# Patient Record
Sex: FEMALE | Race: WHITE | NOT HISPANIC OR LATINO | Employment: OTHER | URBAN - METROPOLITAN AREA
[De-identification: names, ages, dates, MRNs, and addresses within clinical notes are randomized per-mention and may not be internally consistent; named-entity substitution may affect disease eponyms.]

---

## 2019-07-29 ENCOUNTER — OFFICE VISIT (OUTPATIENT)
Dept: GASTROENTEROLOGY | Facility: CLINIC | Age: 59
End: 2019-07-29

## 2019-07-29 VITALS
DIASTOLIC BLOOD PRESSURE: 81 MMHG | BODY MASS INDEX: 29.92 KG/M2 | WEIGHT: 162.6 LBS | SYSTOLIC BLOOD PRESSURE: 131 MMHG | HEIGHT: 62 IN | HEART RATE: 80 BPM

## 2019-07-29 DIAGNOSIS — K21.9 GASTROESOPHAGEAL REFLUX DISEASE, ESOPHAGITIS PRESENCE NOT SPECIFIED: Primary | ICD-10-CM

## 2019-07-29 DIAGNOSIS — R13.19 ESOPHAGEAL DYSPHAGIA: ICD-10-CM

## 2019-07-29 DIAGNOSIS — D12.6 ADENOMATOUS POLYP OF COLON, UNSPECIFIED PART OF COLON: ICD-10-CM

## 2019-07-29 PROCEDURE — 99203 OFFICE O/P NEW LOW 30 MIN: CPT | Performed by: NURSE PRACTITIONER

## 2019-07-29 RX ORDER — AMITRIPTYLINE HYDROCHLORIDE 50 MG/1
50 TABLET, FILM COATED ORAL
Refills: 3 | COMMUNITY
Start: 2019-06-21

## 2019-07-29 RX ORDER — CELECOXIB 200 MG/1
CAPSULE ORAL EVERY 12 HOURS SCHEDULED
COMMUNITY
End: 2020-08-04

## 2019-07-29 RX ORDER — GABAPENTIN 100 MG/1
CAPSULE ORAL
Refills: 2 | COMMUNITY
Start: 2019-07-08 | End: 2020-08-04

## 2019-07-29 RX ORDER — TIZANIDINE 4 MG/1
TABLET ORAL
Refills: 3 | COMMUNITY
Start: 2019-05-23

## 2019-07-29 RX ORDER — ESTRADIOL 0.05 MG/D
FILM, EXTENDED RELEASE TRANSDERMAL
Refills: 12 | COMMUNITY
Start: 2019-07-03

## 2019-07-29 RX ORDER — PRAVASTATIN SODIUM 40 MG
40 TABLET ORAL
Refills: 11 | COMMUNITY
Start: 2019-07-05

## 2019-07-29 RX ORDER — PROMETHAZINE HYDROCHLORIDE 25 MG/1
TABLET ORAL
Refills: 0 | COMMUNITY
Start: 2019-05-28

## 2019-07-29 RX ORDER — DEXLANSOPRAZOLE 60 MG/1
60 CAPSULE, DELAYED RELEASE ORAL DAILY
Qty: 30 CAPSULE | Refills: 11 | Status: SHIPPED | OUTPATIENT
Start: 2019-07-29 | End: 2019-08-28

## 2019-07-29 RX ORDER — FREMANEZUMAB-VFRM 225 MG/1.5ML
INJECTION SUBCUTANEOUS
Refills: 3 | COMMUNITY
Start: 2019-06-11

## 2019-07-29 RX ORDER — LEVETIRACETAM 500 MG/1
1000 TABLET ORAL EVERY 12 HOURS SCHEDULED
COMMUNITY
End: 2021-03-30 | Stop reason: DRUGHIGH

## 2019-07-29 RX ORDER — LINACLOTIDE 290 UG/1
CAPSULE, GELATIN COATED ORAL
Refills: 1 | COMMUNITY
Start: 2019-05-09 | End: 2020-08-04

## 2019-07-29 NOTE — PROGRESS NOTES
"GASTROENTEROLOGY OFFICE NOTE  Delphine Das  [id number]  1960    CARE TEAM  Patient Care Team:  Provider, No Known as PCP - General  Provider, No Known as PCP - Family Medicine  Timur Virgen MD    Referring Provider: Timur Virgen MD    Chief Complaint   Patient presents with   • Heartburn     had it for awhile/ issues are just getting worse    • Nausea     sometimes when eating         HISTORY OF PRESENT ILLNESS:  Ms. Das is a 58-year-old female who presents today with complaints of acid reflux.  She reports that she feels as if her stomach is \"on fire\" constantly and this is been ongoing for about a year.    For the past 2 weeks she has had associated nausea.  She further complains that when she eats she sometimes feels a heaviness in her chest. She has been coughing a lot lately and has developed voice hoarseness.  She is also having troubles with her allergies currently and therefore cannot determine if her coughing and voice hoarseness are related to acid reflux or allergy type symptoms.  She has a long history of acid reflux and has previously taken Prilosec 40 mg daily and Prevacid 30 mg daily as well as Nexium 40 mg twice daily all of which have controlled her reflux for a short time but ultimately she has refractory reflux.  She has not been taking a daily PPI for over a year due to insurance coverage, about 3 weeks ago she began taking Nexium over-the-counter and has not had relief of her current symptoms.    She has a personal and family history of colon polyps.  Her last colonoscopy was in August 2017, 2 tubular adenomatous polyps were removed at that time with recommendations to repeat colonoscopy again in 3 years.    PAST MEDICAL HISTORY  Past Medical History:   Diagnosis Date   • Colon polyp    • GERD (gastroesophageal reflux disease)         PAST SURGICAL HISTORY  Past Surgical History:   Procedure Laterality Date   • COLONOSCOPY     • UPPER GASTROINTESTINAL ENDOSCOPY      "     MEDICATIONS:    Current Outpatient Medications:   •  progesterone (PROMETRIUM) 200 MG capsule, Take  by mouth., Disp: , Rfl:   •  AJOVY 225 MG/1.5ML solution prefilled syringe, INJECT 1.5 ML AS DIRECTED SUBCUTANEOUSLY ONCE A MONTH, Disp: , Rfl: 3  •  amitriptyline (ELAVIL) 50 MG tablet, Take 50 mg by mouth every night at bedtime., Disp: , Rfl: 3  •  celecoxib (CELEBREX) 200 MG capsule, Every 12 (Twelve) Hours., Disp: , Rfl:   •  dexlansoprazole (DEXILANT) 60 MG capsule, Take 1 capsule by mouth Daily for 30 days., Disp: 30 capsule, Rfl: 11  •  estradiol (MINIVELLE, VIVELLE-DOT) 0.05 MG/24HR patch, APPLY 1 PATCH TWICE WEEKLY TO DRY SKIN (EXAMPLE: MON/THURS), Disp: , Rfl: 12  •  gabapentin (NEURONTIN) 100 MG capsule, TAKE ONE CAPSULE BY MOUTH NIGHTLY AT BEDTIME FOR 1 WEEK, THEN INCREASE TO 2 CAPSULES AT BEDTIME FOR 1 WEEK, THEN INCREASE TO 3 CAPSULES NIGH, Disp: , Rfl: 2  •  levETIRAcetam (KEPPRA) 500 MG tablet, Every 12 (Twelve) Hours., Disp: , Rfl:   •  LINZESS 290 MCG capsule capsule, TAKE ONE CAPSULE BY MOUTH EVERY DAY ON AN EMPTY STOMACH, Disp: , Rfl: 1  •  pravastatin (PRAVACHOL) 40 MG tablet, Take 40 mg by mouth every night at bedtime., Disp: , Rfl: 11  •  promethazine (PHENERGAN) 25 MG tablet, TAKE ONE TABLET BY MOUTH THREE TIMES A DAY AS NEEDED FOR HEADACHE NAUSEA AND VOMITING, Disp: , Rfl: 0  •  tiZANidine (ZANAFLEX) 4 MG tablet, TAKE 1/2 TO 1 TABLET BY MOUTH TWO TIMES A DAY AS NEEDED FOR HEADACHE, Disp: , Rfl: 3    ALLERGIES  No Known Allergies    FAMILY HISTORY:  Family History   Problem Relation Age of Onset   • Colon polyps Mother    • Colon polyps Sister        SOCIAL HISTORY  Social History     Socioeconomic History   • Marital status:      Spouse name: Not on file   • Number of children: Not on file   • Years of education: Not on file   • Highest education level: Not on file   Tobacco Use   • Smoking status: Never Smoker   • Smokeless tobacco: Never Used   Substance and Sexual Activity   •  "Alcohol use: Yes     Comment: occasionally    • Drug use: No   • Sexual activity: Defer       REVIEW OF SYSTEMS  Review of Systems   Constitutional: Positive for fatigue.   HENT: Positive for rhinorrhea, trouble swallowing and voice change. Negative for congestion.    Eyes: Positive for itching.   Respiratory: Positive for cough.    Cardiovascular: Negative.    Gastrointestinal: Positive for abdominal pain, nausea and GERD.   Endocrine: Negative.    Genitourinary: Negative.    Musculoskeletal: Negative.    Skin: Negative.    Allergic/Immunologic: Positive for environmental allergies.   Neurological: Positive for headache.   Psychiatric/Behavioral: Negative.      PHYSICAL EXAM   /81   Pulse 80   Ht 157.5 cm (62.01\")   Wt 73.8 kg (162 lb 9.6 oz)   BMI 29.73 kg/m²   Physical Exam   Constitutional: She is oriented to person, place, and time. She appears well-developed and well-nourished.   HENT:   Head: Normocephalic.   Mouth/Throat: Oropharynx is clear and moist.   Eyes: EOM are normal. Pupils are equal, round, and reactive to light.   Neck: Normal range of motion. Neck supple.   Cardiovascular: Normal rate and regular rhythm.   Pulmonary/Chest: Effort normal and breath sounds normal. She has no wheezes. She has no rales.   Abdominal: Soft. Bowel sounds are normal. She exhibits no mass. There is tenderness in the epigastric area. There is no rebound and no guarding. No hernia.   Musculoskeletal: Normal range of motion.   Neurological: She is alert and oriented to person, place, and time. No cranial nerve deficit.   Skin: Skin is warm and dry.   Psychiatric: She has a normal mood and affect. Her behavior is normal. Judgment normal.   Nursing note and vitals reviewed.    Results Review:  [facility] records reviewed and discussed with patient.     ASSESSMENT / PLAN  1.  GERD  -EGD  - Begin Dexilant 60 mg daily  2.  Personal and family history of colon polyps/health maintenance  - Last colonoscopy August " 2017  -Surveillance colonoscopy due in August 2020    Return for Follow up after procedures.    I discussed the patients findings and my recommendations with patient    Arnie Clark APRN

## 2019-07-30 ENCOUNTER — LAB REQUISITION (OUTPATIENT)
Dept: LAB | Facility: HOSPITAL | Age: 59
End: 2019-07-30

## 2019-07-30 ENCOUNTER — OUTSIDE FACILITY SERVICE (OUTPATIENT)
Dept: GASTROENTEROLOGY | Facility: CLINIC | Age: 59
End: 2019-07-30

## 2019-07-30 DIAGNOSIS — R13.10 DYSPHAGIA: ICD-10-CM

## 2019-07-30 DIAGNOSIS — K21.9 GASTRO-ESOPHAGEAL REFLUX DISEASE WITHOUT ESOPHAGITIS: ICD-10-CM

## 2019-07-30 PROCEDURE — 43239 EGD BIOPSY SINGLE/MULTIPLE: CPT | Performed by: INTERNAL MEDICINE

## 2019-07-30 PROCEDURE — 88305 TISSUE EXAM BY PATHOLOGIST: CPT | Performed by: INTERNAL MEDICINE

## 2019-07-31 LAB
CYTO UR: NORMAL
LAB AP CASE REPORT: NORMAL
LAB AP CLINICAL INFORMATION: NORMAL
PATH REPORT.FINAL DX SPEC: NORMAL
PATH REPORT.GROSS SPEC: NORMAL

## 2020-06-01 ENCOUNTER — TELEMEDICINE (OUTPATIENT)
Dept: GASTROENTEROLOGY | Facility: CLINIC | Age: 60
End: 2020-06-01

## 2020-06-01 DIAGNOSIS — K21.9 GASTROESOPHAGEAL REFLUX DISEASE, ESOPHAGITIS PRESENCE NOT SPECIFIED: Primary | ICD-10-CM

## 2020-06-01 DIAGNOSIS — R49.0 HOARSENESS OF VOICE: ICD-10-CM

## 2020-06-01 DIAGNOSIS — R07.89 ATYPICAL CHEST PAIN: ICD-10-CM

## 2020-06-01 PROCEDURE — 99214 OFFICE O/P EST MOD 30 MIN: CPT | Performed by: INTERNAL MEDICINE

## 2020-06-01 NOTE — PROGRESS NOTES
PCP:  Provider, No Known     No referring provider defined for this encounter.    Chief Complaint   Patient presents with   • Hoarse        HPI   The patient is a 59-year-old female with a history of gastroesophageal reflux disease.  She has been on Dexilant.  She does not have significant troubles with dysphasia.  She checked trouble swallowing and has some known esophageal spasm.  She does have some atypical chest pain at times.  This can be left-sided.  She had an upper endoscopy on 7/30/2019.  At that time she had some apparent esophageal spasm as well as a hiatal hernia.  Biopsies were taken and were negative for celiac disease.  She did have some gastritis.  Her last colonoscopy was in 2017.  She has had increasing troubles with hoarseness for the past 2 months.  She saw an ENT and her vocal cords were evaluated.  There was no structural abnormality but they were inflamed.  She has had about a 60 pound weight gain over the past couple years.    No Known Allergies       Current Outpatient Medications:   •  AJOVY 225 MG/1.5ML solution prefilled syringe, INJECT 1.5 ML AS DIRECTED SUBCUTANEOUSLY ONCE A MONTH, Disp: , Rfl: 3  •  amitriptyline (ELAVIL) 50 MG tablet, Take 50 mg by mouth every night at bedtime., Disp: , Rfl: 3  •  celecoxib (CELEBREX) 200 MG capsule, Every 12 (Twelve) Hours., Disp: , Rfl:   •  estradiol (MINIVELLE, VIVELLE-DOT) 0.05 MG/24HR patch, APPLY 1 PATCH TWICE WEEKLY TO DRY SKIN (EXAMPLE: MON/THURS), Disp: , Rfl: 12  •  gabapentin (NEURONTIN) 100 MG capsule, TAKE ONE CAPSULE BY MOUTH NIGHTLY AT BEDTIME FOR 1 WEEK, THEN INCREASE TO 2 CAPSULES AT BEDTIME FOR 1 WEEK, THEN INCREASE TO 3 CAPSULES NIGH, Disp: , Rfl: 2  •  levETIRAcetam (KEPPRA) 500 MG tablet, Every 12 (Twelve) Hours., Disp: , Rfl:   •  LINZESS 290 MCG capsule capsule, TAKE ONE CAPSULE BY MOUTH EVERY DAY ON AN EMPTY STOMACH, Disp: , Rfl: 1  •  pravastatin (PRAVACHOL) 40 MG tablet, Take 40 mg by mouth every night at bedtime., Disp: ,  Rfl: 11  •  progesterone (PROMETRIUM) 200 MG capsule, Take  by mouth., Disp: , Rfl:   •  promethazine (PHENERGAN) 25 MG tablet, TAKE ONE TABLET BY MOUTH THREE TIMES A DAY AS NEEDED FOR HEADACHE NAUSEA AND VOMITING, Disp: , Rfl: 0  •  tiZANidine (ZANAFLEX) 4 MG tablet, TAKE 1/2 TO 1 TABLET BY MOUTH TWO TIMES A DAY AS NEEDED FOR HEADACHE, Disp: , Rfl: 3     Past Medical History:   Diagnosis Date   • Colon polyp    • GERD (gastroesophageal reflux disease)        Past Surgical History:   Procedure Laterality Date   • COLONOSCOPY     • UPPER GASTROINTESTINAL ENDOSCOPY          Social History     Socioeconomic History   • Marital status:      Spouse name: Not on file   • Number of children: Not on file   • Years of education: Not on file   • Highest education level: Not on file   Tobacco Use   • Smoking status: Never Smoker   • Smokeless tobacco: Never Used   Substance and Sexual Activity   • Alcohol use: Yes     Comment: occasionally    • Drug use: No   • Sexual activity: Defer        Family History   Problem Relation Age of Onset   • Colon polyps Mother    • Colon polyps Sister         Review of Systems   Constitutional: Positive for unexpected weight loss. Negative for chills, diaphoresis, fever and unexpected weight gain.   HENT: Positive for trouble swallowing and voice change.    Eyes: Negative for double vision and pain.   Respiratory: Positive for cough and shortness of breath.    Cardiovascular: Positive for chest pain and palpitations.   Gastrointestinal: Negative for abdominal pain, blood in stool, diarrhea, nausea and vomiting.   Genitourinary: Negative for hematuria and urgency.   Neurological: Negative for seizures, syncope and confusion.   Hematological: Positive for adenopathy.        There were no vitals filed for this visit.     Physical Exam   General Appearance: Alert, in no acute distress   Head: Normocephalic, without obvious abnormality, atraumatic   Eyes: Lids and lashes normal, conjunctivae  and sclerae normal, no icterus   Ears: Ears appear intact with no abnormalities noted   Chest Wall: Symmetrical respiratory expansion   Extremities: Moves all extremities well   Skin: No bleeding, bruising or rash   Neurologic: Cranial nerves 2 - 12 grossly intact, no focal deficits     Review of systems was reviewed and positives are noted. All of the remaining review of systems in that system are negative.    Delphine was seen today for hoarse.    Diagnoses and all orders for this visit:    Gastroesophageal reflux disease, esophagitis presence not specified    Hoarseness of voice    Atypical chest pain    Impressions and plan #1 gastroesophageal reflux disease: I am going to continue the Dexilant.  We will have her double it for the next few weeks to see if it does not make a difference.  If this is related to reflux we should see some improvement in the next few weeks.  We will see her back in follow-up.  She will call us with worsening symptoms.  Gallbladder disease could cause atypical chest pain as well but it certainly would not cause hoarseness.    Clinton Bernard MD

## 2020-06-03 ENCOUNTER — TELEPHONE (OUTPATIENT)
Dept: GASTROENTEROLOGY | Facility: CLINIC | Age: 60
End: 2020-06-03

## 2020-06-04 ENCOUNTER — APPOINTMENT (OUTPATIENT)
Dept: CT IMAGING | Facility: HOSPITAL | Age: 60
End: 2020-06-04

## 2020-06-04 ENCOUNTER — APPOINTMENT (OUTPATIENT)
Dept: GENERAL RADIOLOGY | Facility: HOSPITAL | Age: 60
End: 2020-06-04

## 2020-06-04 ENCOUNTER — HOSPITAL ENCOUNTER (INPATIENT)
Facility: HOSPITAL | Age: 60
LOS: 1 days | Discharge: HOME | End: 2020-06-06
Attending: EMERGENCY MEDICINE | Admitting: FAMILY MEDICINE

## 2020-06-04 DIAGNOSIS — Z87.19 HISTORY OF GASTROESOPHAGEAL REFLUX (GERD): ICD-10-CM

## 2020-06-04 DIAGNOSIS — R09.02 HYPOXIA: ICD-10-CM

## 2020-06-04 DIAGNOSIS — Z77.22 SECOND HAND TOBACCO SMOKE EXPOSURE: ICD-10-CM

## 2020-06-04 DIAGNOSIS — I24.9 ACUTE CORONARY SYNDROME (HCC): Primary | ICD-10-CM

## 2020-06-04 DIAGNOSIS — R94.31 ABNORMAL EKG: ICD-10-CM

## 2020-06-04 DIAGNOSIS — R06.02 SHORTNESS OF BREATH: ICD-10-CM

## 2020-06-04 DIAGNOSIS — Z86.39 HISTORY OF HYPERLIPIDEMIA: ICD-10-CM

## 2020-06-04 LAB
ALBUMIN SERPL-MCNC: 4.6 G/DL (ref 3.5–5.2)
ALBUMIN/GLOB SERPL: 1.4 G/DL
ALP SERPL-CCNC: 97 U/L (ref 39–117)
ALT SERPL W P-5'-P-CCNC: 25 U/L (ref 1–33)
ANION GAP SERPL CALCULATED.3IONS-SCNC: 16 MMOL/L (ref 5–15)
AST SERPL-CCNC: 24 U/L (ref 1–32)
BASOPHILS # BLD AUTO: 0.06 10*3/MM3 (ref 0–0.2)
BASOPHILS NFR BLD AUTO: 0.4 % (ref 0–1.5)
BILIRUB SERPL-MCNC: 0.4 MG/DL (ref 0.2–1.2)
BUN BLD-MCNC: 12 MG/DL (ref 6–20)
BUN/CREAT SERPL: 12 (ref 7–25)
CALCIUM SPEC-SCNC: 9.3 MG/DL (ref 8.6–10.5)
CHLORIDE SERPL-SCNC: 100 MMOL/L (ref 98–107)
CO2 SERPL-SCNC: 23 MMOL/L (ref 22–29)
CREAT BLD-MCNC: 1 MG/DL (ref 0.57–1)
CRP SERPL-MCNC: 0.42 MG/DL (ref 0–0.5)
D DIMER PPP FEU-MCNC: <0.27 MCGFEU/ML (ref 0–0.56)
D-LACTATE SERPL-SCNC: 1.6 MMOL/L (ref 0.5–2)
D-LACTATE SERPL-SCNC: 2.5 MMOL/L (ref 0.5–2)
DEPRECATED RDW RBC AUTO: 42.6 FL (ref 37–54)
EOSINOPHIL # BLD AUTO: 0.04 10*3/MM3 (ref 0–0.4)
EOSINOPHIL NFR BLD AUTO: 0.3 % (ref 0.3–6.2)
ERYTHROCYTE [DISTWIDTH] IN BLOOD BY AUTOMATED COUNT: 12.5 % (ref 12.3–15.4)
GFR SERPL CREATININE-BSD FRML MDRD: 57 ML/MIN/1.73
GLOBULIN UR ELPH-MCNC: 3.2 GM/DL
GLUCOSE BLD-MCNC: 110 MG/DL (ref 65–99)
HCT VFR BLD AUTO: 45.3 % (ref 34–46.6)
HGB BLD-MCNC: 15.3 G/DL (ref 12–15.9)
HOLD SPECIMEN: NORMAL
HOLD SPECIMEN: NORMAL
IMM GRANULOCYTES # BLD AUTO: 0.04 10*3/MM3 (ref 0–0.05)
IMM GRANULOCYTES NFR BLD AUTO: 0.3 % (ref 0–0.5)
LACTATE HOLD SPECIMEN: NORMAL
LDH SERPL-CCNC: 256 U/L (ref 135–214)
LYMPHOCYTES # BLD AUTO: 3.26 10*3/MM3 (ref 0.7–3.1)
LYMPHOCYTES NFR BLD AUTO: 22.6 % (ref 19.6–45.3)
MCH RBC QN AUTO: 31.4 PG (ref 26.6–33)
MCHC RBC AUTO-ENTMCNC: 33.8 G/DL (ref 31.5–35.7)
MCV RBC AUTO: 92.8 FL (ref 79–97)
MONOCYTES # BLD AUTO: 0.78 10*3/MM3 (ref 0.1–0.9)
MONOCYTES NFR BLD AUTO: 5.4 % (ref 5–12)
NEUTROPHILS # BLD AUTO: 10.23 10*3/MM3 (ref 1.7–7)
NEUTROPHILS NFR BLD AUTO: 71 % (ref 42.7–76)
NRBC BLD AUTO-RTO: 0 /100 WBC (ref 0–0.2)
NT-PROBNP SERPL-MCNC: 25 PG/ML (ref 5–900)
PLATELET # BLD AUTO: 352 10*3/MM3 (ref 140–450)
PMV BLD AUTO: 9.4 FL (ref 6–12)
POTASSIUM BLD-SCNC: 4.5 MMOL/L (ref 3.5–5.2)
PROCALCITONIN SERPL-MCNC: 0.05 NG/ML (ref 0.1–0.25)
PROT SERPL-MCNC: 7.8 G/DL (ref 6–8.5)
RBC # BLD AUTO: 4.88 10*6/MM3 (ref 3.77–5.28)
SODIUM BLD-SCNC: 139 MMOL/L (ref 136–145)
TROPONIN T SERPL-MCNC: <0.01 NG/ML (ref 0–0.03)
TROPONIN T SERPL-MCNC: <0.01 NG/ML (ref 0–0.03)
WBC NRBC COR # BLD: 14.41 10*3/MM3 (ref 3.4–10.8)
WHOLE BLOOD HOLD SPECIMEN: NORMAL
WHOLE BLOOD HOLD SPECIMEN: NORMAL

## 2020-06-04 PROCEDURE — 84145 PROCALCITONIN (PCT): CPT | Performed by: PHYSICIAN ASSISTANT

## 2020-06-04 PROCEDURE — 93005 ELECTROCARDIOGRAM TRACING: CPT

## 2020-06-04 PROCEDURE — 93005 ELECTROCARDIOGRAM TRACING: CPT | Performed by: PHYSICIAN ASSISTANT

## 2020-06-04 PROCEDURE — 36415 COLL VENOUS BLD VENIPUNCTURE: CPT

## 2020-06-04 PROCEDURE — 25010000002 METHYLPREDNISOLONE PER 40 MG: Performed by: PHYSICIAN ASSISTANT

## 2020-06-04 PROCEDURE — 84484 ASSAY OF TROPONIN QUANT: CPT | Performed by: PHYSICIAN ASSISTANT

## 2020-06-04 PROCEDURE — 71275 CT ANGIOGRAPHY CHEST: CPT

## 2020-06-04 PROCEDURE — 83605 ASSAY OF LACTIC ACID: CPT

## 2020-06-04 PROCEDURE — 71045 X-RAY EXAM CHEST 1 VIEW: CPT

## 2020-06-04 PROCEDURE — 83605 ASSAY OF LACTIC ACID: CPT | Performed by: EMERGENCY MEDICINE

## 2020-06-04 PROCEDURE — 87040 BLOOD CULTURE FOR BACTERIA: CPT

## 2020-06-04 PROCEDURE — 93005 ELECTROCARDIOGRAM TRACING: CPT | Performed by: EMERGENCY MEDICINE

## 2020-06-04 PROCEDURE — 86140 C-REACTIVE PROTEIN: CPT | Performed by: PHYSICIAN ASSISTANT

## 2020-06-04 PROCEDURE — 0 IOPAMIDOL PER 1 ML: Performed by: EMERGENCY MEDICINE

## 2020-06-04 PROCEDURE — 83880 ASSAY OF NATRIURETIC PEPTIDE: CPT

## 2020-06-04 PROCEDURE — 80053 COMPREHEN METABOLIC PANEL: CPT

## 2020-06-04 PROCEDURE — 85379 FIBRIN DEGRADATION QUANT: CPT | Performed by: PHYSICIAN ASSISTANT

## 2020-06-04 PROCEDURE — 84484 ASSAY OF TROPONIN QUANT: CPT

## 2020-06-04 PROCEDURE — 83615 LACTATE (LD) (LDH) ENZYME: CPT | Performed by: PHYSICIAN ASSISTANT

## 2020-06-04 PROCEDURE — 99285 EMERGENCY DEPT VISIT HI MDM: CPT

## 2020-06-04 PROCEDURE — 85025 COMPLETE CBC W/AUTO DIFF WBC: CPT

## 2020-06-04 RX ORDER — SODIUM CHLORIDE 0.9 % (FLUSH) 0.9 %
10 SYRINGE (ML) INJECTION AS NEEDED
Status: DISCONTINUED | OUTPATIENT
Start: 2020-06-04 | End: 2020-06-06 | Stop reason: HOSPADM

## 2020-06-04 RX ORDER — METHYLPREDNISOLONE SODIUM SUCCINATE 40 MG/ML
80 INJECTION, POWDER, LYOPHILIZED, FOR SOLUTION INTRAMUSCULAR; INTRAVENOUS ONCE
Status: COMPLETED | OUTPATIENT
Start: 2020-06-04 | End: 2020-06-04

## 2020-06-04 RX ADMIN — IOPAMIDOL 100 ML: 755 INJECTION, SOLUTION INTRAVENOUS at 22:42

## 2020-06-04 RX ADMIN — SODIUM CHLORIDE, PRESERVATIVE FREE 10 ML: 5 INJECTION INTRAVENOUS at 20:08

## 2020-06-04 RX ADMIN — METHYLPREDNISOLONE SODIUM SUCCINATE 80 MG: 40 INJECTION, POWDER, FOR SOLUTION INTRAMUSCULAR; INTRAVENOUS at 20:08

## 2020-06-04 RX ADMIN — SODIUM CHLORIDE, PRESERVATIVE FREE 10 ML: 5 INJECTION INTRAVENOUS at 20:09

## 2020-06-04 NOTE — ED PROVIDER NOTES
Subjective   This is a 59-year-old female that presents to the ER with progressive shortness of breath that has been ongoing for the last 2 months.  Patient reports allergy type symptoms, which she utilizes Flonase and intermittent antihistamines.  Patient denies any significant postnasal drainage or sinus pressure/congestion.  She reports pain to both ears, scratchy throat, and hoarseness with speaking.  She denies any fever or chills.  She reports shortness of breath with conversation and any type of exertion.  She denies any pleuritic type chest pain, but reports intermittent left sided sharp chest pains that radiates to her left upper back.  She denies any history of asthma or COPD or pulmonary fibrosis, but she has had secondhand smoke exposure for most of her life.  She has been seen multiple times since April, 2020 for the above symptoms.  She has had 2- COVID-19 tests, the last one being last week.  She has tested negative for strep throat and negative for influenza.  She just recently finished a steroid taper last week and has been taking Stahist antihistamine.  She has history of GERD and was evaluated by Dr. Bernard last week.  He did not feel that her current symptoms were related to reflux.  Her last EGD was in 2019 and patient only reported history of hiatal hernia.  She was also seen by Dr. Montano, ENT on 6/1/2020 and had a scope in the office.  He told her that 1 of her vocal cords was inflamed, but otherwise the scope was negative.  He referred her to pulmonology here at [facility] in the close future.  Patient denies any lower extremity pedal edema.  She is currently on estradiol and progesterone for hormone replacement.  No other concerns at this time.      History provided by:  Patient  Shortness of Breath   Duration:  2 months (since April, 2020)  Timing:  Constant  Progression:  Unchanged  Context comment:  Pt has been feeling poorly since April, 2020 with symptoms of ear pain,  sore throat, hoarseness, chest tightness, and shortness of breath.  Relieved by:  Nothing  Worsened by:  Exertion  Ineffective treatments: Treated with a recent steroid taper and finished last week.  Associated symptoms: chest pain (intermittent sharp left sided chest pain), cough (non-productive), ear pain and sore throat (hoarseness)    Associated symptoms: no abdominal pain, no diaphoresis, no fever, no headaches, no neck pain, no sputum production, no syncope, no vomiting and no wheezing    Associated symptoms comment:  Saw Dr. Montano, ENT, on 6/1/20. Scope showed an inflamed vocal cord, but otherwise negative per patient.  ENT is referring patient to Pulmonology in the near future.  Risk factors comment:  Covid-19 negative on 4/29/20 and another negative test last week.  Patient also had negative RSS and negative flu test. Saw Dr. Bernard last week.  EGD last year showed hiatal hernia, but otherwise negative.      Review of Systems   Constitutional: Positive for fatigue. Negative for chills, diaphoresis and fever.   HENT: Positive for ear pain, sore throat (hoarseness) and voice change (hoarseness). Negative for congestion, sinus pressure and sinus pain.    Respiratory: Positive for cough (non-productive) and shortness of breath. Negative for sputum production and wheezing.    Cardiovascular: Positive for chest pain (intermittent sharp left sided chest pain). Negative for syncope.   Gastrointestinal: Positive for nausea. Negative for abdominal pain, constipation, diarrhea and vomiting.   Genitourinary: Negative.    Musculoskeletal: Negative for back pain, myalgias and neck pain.   Neurological: Negative for dizziness, syncope, weakness and headaches.   All other systems reviewed and are negative.      Past Medical History:   Diagnosis Date   • Colon polyp    • GERD (gastroesophageal reflux disease)        No Known Allergies    Past Surgical History:   Procedure Laterality Date   • COLONOSCOPY     • UPPER  GASTROINTESTINAL ENDOSCOPY         Family History   Problem Relation Age of Onset   • Colon polyps Mother    • Colon polyps Sister        Social History     Socioeconomic History   • Marital status:      Spouse name: Not on file   • Number of children: Not on file   • Years of education: Not on file   • Highest education level: Not on file   Tobacco Use   • Smoking status: Never Smoker   • Smokeless tobacco: Never Used   Substance and Sexual Activity   • Alcohol use: Yes     Comment: occasionally    • Drug use: No   • Sexual activity: Defer           Objective   Physical Exam   Constitutional: She is oriented to person, place, and time. She appears well-developed and well-nourished. No distress.   HENT:   Head: Normocephalic and atraumatic.   Nose: No rhinorrhea. Right sinus exhibits no maxillary sinus tenderness and no frontal sinus tenderness. Left sinus exhibits no maxillary sinus tenderness and no frontal sinus tenderness.   Mouth/Throat: Mucous membranes are normal. No posterior oropharyngeal erythema.   Hoarseness with speaking. Oropharynx non-erythematous.   Eyes: Conjunctivae are normal. No scleral icterus.   Neck: Normal range of motion. Neck supple.   Cardiovascular: Regular rhythm, normal heart sounds, intact distal pulses and normal pulses. Tachycardia present.   No pedal edema to bilateral lower extremities   Pulmonary/Chest: Effort normal and breath sounds normal. No accessory muscle usage. Tachypnea noted. No respiratory distress. She has no decreased breath sounds. She has no wheezes. She has no rhonchi.   Tachypnea with conversation.  No wheezes, rhonchi, or rales.   Abdominal: Soft. She exhibits no distension. There is no tenderness.   Musculoskeletal: Normal range of motion. She exhibits no edema.   Lymphadenopathy:     She has no cervical adenopathy.   No cervical LAD but tenderness to bilateral anterior cervical chains.   Neurological: She is alert and oriented to person, place, and time.    Skin: Skin is warm and dry. She is not diaphoretic.   Psychiatric: She has a normal mood and affect. Her behavior is normal.   Nursing note and vitals reviewed.      Critical Care  Performed by: Mari Watson PA-C  Authorized by: Finesse Moreno DO     Critical care provider statement:     Critical care time (minutes):  35    Critical care was necessary to treat or prevent imminent or life-threatening deterioration of the following conditions:  Cardiac failure (Acute coronary syndrome)    Critical care was time spent personally by me on the following activities:  Blood draw for specimens, development of treatment plan with patient or surrogate, evaluation of patient's response to treatment, examination of patient, obtaining history from patient or surrogate, review of old charts, re-evaluation of patient's condition, pulse oximetry, ordering and review of radiographic studies, ordering and review of laboratory studies and ordering and performing treatments and interventions               ED Course  ED Course as of Jun 05 0232 Fri Jun 05, 2020   0226 Initial EKG showed normal sinus rhythm.  There was no acute ST-T wave changes consistent with ischemia.  Chest x-ray showed no acute cardiopulmonary process.  CT angiogram of the chest with contrast revealed no PE or aortic dissection.  Other than some mild atelectasis in the lower lobes and lingula, no active disease in the chest.  CBC and chemistries were essentially normal.  Lactic acid was 2.5 and repeat lactic acid was 1.6.  LDH was 256.  BNP was 25.  Blood cultures x2 sets are in process.  D-dimer was less than 0.27.  Repeat 2-hour EKG showed significant T wave inversion in the anterior leads.  2 sets of troponins were less than 0.010.  Heart score is 4.  Discussed the case in detail with Dr. Moreno, ER attending physician.  He also saw and evaluated the patient.  Patient denies any chest pain throughout the entire ER evaluation, but she does report chest  tightness.  We will initiate heparin bolus and drip, 1 sublingual nitroglycerin.  Due to patient's increased anxiety, we also will order Ativan 0.5 mg IV.  We updated patient and family on all results and need for admission.  History and work-up are concerning for acute coronary syndrome and patient needs cardiac rule out per MI protocol.    [FC]   0229 Discussed the case with Dr. Marin, hospitalist, and she is agreeable to admission on telemetry.    [FC]      ED Course User Index  [FC] Mari Watson PA-C           Recent Results (from the past 24 hour(s))   Comprehensive Metabolic Panel    Collection Time: 06/04/20  6:12 PM   Result Value Ref Range    Glucose 110 (H) 65 - 99 mg/dL    BUN 12 6 - 20 mg/dL    Creatinine 1.00 0.57 - 1.00 mg/dL    Sodium 139 136 - 145 mmol/L    Potassium 4.5 3.5 - 5.2 mmol/L    Chloride 100 98 - 107 mmol/L    CO2 23.0 22.0 - 29.0 mmol/L    Calcium 9.3 8.6 - 10.5 mg/dL    Total Protein 7.8 6.0 - 8.5 g/dL    Albumin 4.60 3.50 - 5.20 g/dL    ALT (SGPT) 25 1 - 33 U/L    AST (SGOT) 24 1 - 32 U/L    Alkaline Phosphatase 97 39 - 117 U/L    Total Bilirubin 0.4 0.2 - 1.2 mg/dL    eGFR Non African Amer 57 (L) >60 mL/min/1.73    Globulin 3.2 gm/dL    A/G Ratio 1.4 g/dL    BUN/Creatinine Ratio 12.0 7.0 - 25.0    Anion Gap 16.0 (H) 5.0 - 15.0 mmol/L   BNP    Collection Time: 06/04/20  6:12 PM   Result Value Ref Range    proBNP 25.0 5.0 - 900.0 pg/mL   Troponin    Collection Time: 06/04/20  6:12 PM   Result Value Ref Range    Troponin T <0.010 0.000 - 0.030 ng/mL   Light Blue Top    Collection Time: 06/04/20  6:12 PM   Result Value Ref Range    Extra Tube hold for add-on    Green Top (Gel)    Collection Time: 06/04/20  6:12 PM   Result Value Ref Range    Extra Tube Hold for add-ons.    Lavender Top    Collection Time: 06/04/20  6:12 PM   Result Value Ref Range    Extra Tube hold for add-on    Gold Top - SST    Collection Time: 06/04/20  6:12 PM   Result Value Ref Range    Extra Tube Hold for add-ons.     CBC Auto Differential    Collection Time: 06/04/20  6:12 PM   Result Value Ref Range    WBC 14.41 (H) 3.40 - 10.80 10*3/mm3    RBC 4.88 3.77 - 5.28 10*6/mm3    Hemoglobin 15.3 12.0 - 15.9 g/dL    Hematocrit 45.3 34.0 - 46.6 %    MCV 92.8 79.0 - 97.0 fL    MCH 31.4 26.6 - 33.0 pg    MCHC 33.8 31.5 - 35.7 g/dL    RDW 12.5 12.3 - 15.4 %    RDW-SD 42.6 37.0 - 54.0 fl    MPV 9.4 6.0 - 12.0 fL    Platelets 352 140 - 450 10*3/mm3    Neutrophil % 71.0 42.7 - 76.0 %    Lymphocyte % 22.6 19.6 - 45.3 %    Monocyte % 5.4 5.0 - 12.0 %    Eosinophil % 0.3 0.3 - 6.2 %    Basophil % 0.4 0.0 - 1.5 %    Immature Grans % 0.3 0.0 - 0.5 %    Neutrophils, Absolute 10.23 (H) 1.70 - 7.00 10*3/mm3    Lymphocytes, Absolute 3.26 (H) 0.70 - 3.10 10*3/mm3    Monocytes, Absolute 0.78 0.10 - 0.90 10*3/mm3    Eosinophils, Absolute 0.04 0.00 - 0.40 10*3/mm3    Basophils, Absolute 0.06 0.00 - 0.20 10*3/mm3    Immature Grans, Absolute 0.04 0.00 - 0.05 10*3/mm3    nRBC 0.0 0.0 - 0.2 /100 WBC   Lactic Acid, Plasma    Collection Time: 06/04/20  6:12 PM   Result Value Ref Range    Lactate 2.5 (C) 0.5 - 2.0 mmol/L   Lactic Acid, Reflex Timer (This will reflex a repeat order 3-3:15 hours after ordered.)    Collection Time: 06/04/20  6:12 PM   Result Value Ref Range    Hold Tube Hold for add-ons.    Procalcitonin    Collection Time: 06/04/20  6:12 PM   Result Value Ref Range    Procalcitonin 0.05 (L) 0.10 - 0.25 ng/mL   Lactate Dehydrogenase    Collection Time: 06/04/20  6:12 PM   Result Value Ref Range     (H) 135 - 214 U/L   C-reactive Protein    Collection Time: 06/04/20  6:12 PM   Result Value Ref Range    C-Reactive Protein 0.42 0.00 - 0.50 mg/dL   D-dimer, Quantitative    Collection Time: 06/04/20  6:12 PM   Result Value Ref Range    D-Dimer, Quantitative <0.27 0.00 - 0.56 MCGFEU/mL   Troponin    Collection Time: 06/04/20  8:04 PM   Result Value Ref Range    Troponin T <0.010 0.000 - 0.030 ng/mL   Lactic Acid, Reflex    Collection Time:  06/04/20 11:18 PM   Result Value Ref Range    Lactate 1.6 0.5 - 2.0 mmol/L   Protime-INR    Collection Time: 06/05/20 12:54 AM   Result Value Ref Range    Protime 13.0 11.5 - 14.0 Seconds    INR 1.01 0.85 - 1.16   aPTT    Collection Time: 06/05/20 12:54 AM   Result Value Ref Range    PTT 28.9 (L) 50.0 - 95.0 seconds   Heparin Anti-Xa    Collection Time: 06/05/20 12:54 AM   Result Value Ref Range    Heparin Anti-Xa (UFH) 0.10 (L) 0.30 - 0.70 IU/ml   CBC Auto Differential    Collection Time: 06/05/20 12:54 AM   Result Value Ref Range    WBC 13.84 (H) 3.40 - 10.80 10*3/mm3    RBC 5.06 3.77 - 5.28 10*6/mm3    Hemoglobin 15.9 12.0 - 15.9 g/dL    Hematocrit 45.5 34.0 - 46.6 %    MCV 89.9 79.0 - 97.0 fL    MCH 31.4 26.6 - 33.0 pg    MCHC 34.9 31.5 - 35.7 g/dL    RDW 12.5 12.3 - 15.4 %    RDW-SD 41.2 37.0 - 54.0 fl    MPV 9.5 6.0 - 12.0 fL    Platelets 341 140 - 450 10*3/mm3    Neutrophil % 85.1 (H) 42.7 - 76.0 %    Lymphocyte % 13.5 (L) 19.6 - 45.3 %    Monocyte % 0.9 (L) 5.0 - 12.0 %    Eosinophil % 0.1 (L) 0.3 - 6.2 %    Basophil % 0.1 0.0 - 1.5 %    Immature Grans % 0.3 0.0 - 0.5 %    Neutrophils, Absolute 11.77 (H) 1.70 - 7.00 10*3/mm3    Lymphocytes, Absolute 1.87 0.70 - 3.10 10*3/mm3    Monocytes, Absolute 0.13 0.10 - 0.90 10*3/mm3    Eosinophils, Absolute 0.01 0.00 - 0.40 10*3/mm3    Basophils, Absolute 0.02 0.00 - 0.20 10*3/mm3    Immature Grans, Absolute 0.04 0.00 - 0.05 10*3/mm3    nRBC 0.0 0.0 - 0.2 /100 WBC     Note: In addition to lab results from this visit, the labs listed above may include labs taken at another facility or during a different encounter within the last 24 hours. Please correlate lab times with ED admission and discharge times for further clarification of the services performed during this visit.    CT Angiogram Chest   Final Result      1. No PE or aortic dissection.   2. Other than some mild atelectasis in the lower lobes and lingula, no active disease in the chest.   3. Small hiatal hernia.       Signer Name: Saji Guevara MD    Signed: 6/4/2020 10:52 PM    Workstation Name: [facility]     [facility]      XR Chest 1 View   Final Result   No acute cardiopulmonary process.       D:  06/04/2020   E:  06/04/2020       This report was finalized on 6/4/2020 7:10 PM by Dr. Shawn Botello.            Vitals:    06/05/20 0130 06/05/20 0131 06/05/20 0140 06/05/20 0200   BP:   109/79 115/71   Pulse: 110 110 107 103   Resp:    20   Temp:       TempSrc:       SpO2: 93% 92% 95% 93%   Weight:       Height:         Medications   sodium chloride 0.9 % flush 10 mL (10 mL Intravenous Given 6/4/20 2009)   sodium chloride 0.9 % flush 10 mL (10 mL Intravenous Given 6/4/20 2008)   heparin 43303 units/250 mL (100 units/mL) in 0.45 % NaCl infusion (12 Units/kg/hr × 73.5 kg Intravenous Currently Infusing 6/5/20 0131)   Pharmacy to Dose Heparin (has no administration in time range)   methylPREDNISolone sodium succinate (SOLU-Medrol) injection 80 mg (80 mg Intravenous Given 6/4/20 2008)   iopamidol (ISOVUE-370) 76 % injection 100 mL (100 mL Intravenous Given 6/4/20 2242)   albuterol sulfate HFA (PROVENTIL HFA;VENTOLIN HFA;PROAIR HFA) inhaler 2 puff (2 puffs Inhalation Given 6/5/20 0055)   heparin (porcine) injection 4,000 Units (4,000 Units Intravenous Given 6/5/20 0118)   nitroglycerin (NITROSTAT) SL tablet 0.4 mg (0.4 mg Sublingual Given 6/5/20 0119)   LORazepam (ATIVAN) injection 0.5 mg (0.5 mg Intravenous Given 6/5/20 0118)     ECG/EMG Results (last 24 hours)     Procedure Component Value Units Date/Time    ECG 12 Lead [[id number]] Collected:  06/04/20 1740     Updated:  06/04/20 1742        ECG 12 Lead         ECG 12 Lead                                           HEART Score (for prediction of 6-week risk of major adverse cardiac event) reviewed and/or performed as part of the patient evaluation and treatment planning process.  The result associated with this review/performance is: 4       MDM    Final  diagnoses:   Acute coronary syndrome (CMS/HCC)   Abnormal EKG   Shortness of breath   Hypoxia   History of hyperlipidemia   History of gastroesophageal reflux (GERD)   Second hand tobacco smoke exposure            Mari Watson PA-C  06/05/20 3240

## 2020-06-05 ENCOUNTER — APPOINTMENT (OUTPATIENT)
Dept: CARDIOLOGY | Facility: HOSPITAL | Age: 60
End: 2020-06-05

## 2020-06-05 PROBLEM — J04.0 LARYNGITIS: Status: ACTIVE | Noted: 2020-06-05

## 2020-06-05 PROBLEM — G43.909 MIGRAINES: Status: ACTIVE | Noted: 2020-06-05

## 2020-06-05 PROBLEM — R06.00 DYSPNEA: Status: ACTIVE | Noted: 2020-06-05

## 2020-06-05 PROBLEM — E87.20 LACTIC ACIDOSIS: Status: ACTIVE | Noted: 2020-06-05

## 2020-06-05 PROBLEM — J38.3 VOCAL CORD DYSFUNCTION: Status: ACTIVE | Noted: 2020-06-05

## 2020-06-05 PROBLEM — Q21.12 PFO (PATENT FORAMEN OVALE): Status: ACTIVE | Noted: 2020-06-05

## 2020-06-05 PROBLEM — I24.9 ACUTE CORONARY SYNDROME: Status: ACTIVE | Noted: 2020-06-05

## 2020-06-05 PROBLEM — I20.0 UNSTABLE ANGINA (HCC): Status: ACTIVE | Noted: 2020-06-05

## 2020-06-05 PROBLEM — D72.829 LEUKOCYTOSIS: Status: ACTIVE | Noted: 2020-06-05

## 2020-06-05 LAB
ANION GAP SERPL CALCULATED.3IONS-SCNC: 15 MMOL/L (ref 5–15)
APTT PPP: 28.9 SECONDS (ref 50–95)
ARTERIAL PATENCY WRIST A: ABNORMAL
ATMOSPHERIC PRESS: ABNORMAL MM[HG]
BASE EXCESS BLDA CALC-SCNC: -2.4 MMOL/L (ref 0–2)
BASOPHILS # BLD AUTO: 0.02 10*3/MM3 (ref 0–0.2)
BASOPHILS # BLD MANUAL: 0 10*3/MM3 (ref 0–0.2)
BASOPHILS NFR BLD AUTO: 0 % (ref 0–1.5)
BASOPHILS NFR BLD AUTO: 0.1 % (ref 0–1.5)
BDY SITE: ABNORMAL
BH CV ECHO MEAS - AO ROOT AREA (BSA CORRECTED): 1.4
BH CV ECHO MEAS - AO ROOT AREA: 5.2 CM^2
BH CV ECHO MEAS - AO ROOT DIAM: 2.6 CM
BH CV ECHO MEAS - BSA(HAYCOCK): 1.9 M^2
BH CV ECHO MEAS - BSA: 1.8 M^2
BH CV ECHO MEAS - BZI_BMI: 32.2 KILOGRAMS/M^2
BH CV ECHO MEAS - BZI_METRIC_HEIGHT: 157.5 CM
BH CV ECHO MEAS - BZI_METRIC_WEIGHT: 79.8 KG
BH CV ECHO MEAS - EDV(CUBED): 60.6 ML
BH CV ECHO MEAS - EDV(MOD-SP2): 41 ML
BH CV ECHO MEAS - EDV(MOD-SP4): 55 ML
BH CV ECHO MEAS - EDV(TEICH): 67 ML
BH CV ECHO MEAS - EF(CUBED): 71.3 %
BH CV ECHO MEAS - EF(MOD-SP2): 63.4 %
BH CV ECHO MEAS - EF(MOD-SP4): 74.5 %
BH CV ECHO MEAS - EF(TEICH): 63.6 %
BH CV ECHO MEAS - EF{MOD-BP}: 73 %
BH CV ECHO MEAS - ESV(CUBED): 17.4 ML
BH CV ECHO MEAS - ESV(MOD-SP2): 15 ML
BH CV ECHO MEAS - ESV(MOD-SP4): 14 ML
BH CV ECHO MEAS - ESV(TEICH): 24.4 ML
BH CV ECHO MEAS - FS: 34.1 %
BH CV ECHO MEAS - IVS/LVPW: 1.2
BH CV ECHO MEAS - IVSD: 1.1 CM
BH CV ECHO MEAS - LA DIMENSION: 3.3 CM
BH CV ECHO MEAS - LA/AO: 1.3
BH CV ECHO MEAS - LAD MAJOR: 5.7 CM
BH CV ECHO MEAS - LAT PEAK E' VEL: 10.6 CM/SEC
BH CV ECHO MEAS - LATERAL E/E' RATIO: 5.1
BH CV ECHO MEAS - LV DIASTOLIC VOL/BSA (35-75): 30.4 ML/M^2
BH CV ECHO MEAS - LV MASS(C)D: 118.1 GRAMS
BH CV ECHO MEAS - LV MASS(C)DI: 65.2 GRAMS/M^2
BH CV ECHO MEAS - LV SYSTOLIC VOL/BSA (12-30): 7.7 ML/M^2
BH CV ECHO MEAS - LVIDD: 3.9 CM
BH CV ECHO MEAS - LVIDS: 2.6 CM
BH CV ECHO MEAS - LVLD AP2: 6.7 CM
BH CV ECHO MEAS - LVLD AP4: 7.6 CM
BH CV ECHO MEAS - LVLS AP2: 6.4 CM
BH CV ECHO MEAS - LVLS AP4: 6.5 CM
BH CV ECHO MEAS - LVPWD: 0.88 CM
BH CV ECHO MEAS - MED PEAK E' VEL: 7.7 CM/SEC
BH CV ECHO MEAS - MEDIAL E/E' RATIO: 7
BH CV ECHO MEAS - MV A MAX VEL: 71.1 CM/SEC
BH CV ECHO MEAS - MV DEC SLOPE: 346 CM/SEC^2
BH CV ECHO MEAS - MV DEC TIME: 0.19 SEC
BH CV ECHO MEAS - MV E MAX VEL: 55.3 CM/SEC
BH CV ECHO MEAS - MV E/A: 0.78
BH CV ECHO MEAS - MV P1/2T MAX VEL: 97.7 CM/SEC
BH CV ECHO MEAS - MV P1/2T: 82.7 MSEC
BH CV ECHO MEAS - MVA P1/2T LCG: 2.3 CM^2
BH CV ECHO MEAS - MVA(P1/2T): 2.7 CM^2
BH CV ECHO MEAS - PA ACC SLOPE: 471.8 CM/SEC^2
BH CV ECHO MEAS - PA ACC TIME: 0.14 SEC
BH CV ECHO MEAS - PA PR(ACCEL): 14 MMHG
BH CV ECHO MEAS - PULM DIAS VEL: 36 CM/SEC
BH CV ECHO MEAS - PULM S/D: 1.6
BH CV ECHO MEAS - PULM SYS VEL: 59.2 CM/SEC
BH CV ECHO MEAS - RVDD: 2.6 CM
BH CV ECHO MEAS - SI(CUBED): 23.9 ML/M^2
BH CV ECHO MEAS - SI(MOD-SP2): 14.4 ML/M^2
BH CV ECHO MEAS - SI(MOD-SP4): 22.6 ML/M^2
BH CV ECHO MEAS - SI(TEICH): 23.6 ML/M^2
BH CV ECHO MEAS - SV(CUBED): 43.2 ML
BH CV ECHO MEAS - SV(MOD-SP2): 26 ML
BH CV ECHO MEAS - SV(MOD-SP4): 41 ML
BH CV ECHO MEAS - SV(TEICH): 42.7 ML
BH CV ECHO MEAS - TAPSE (>1.6): 1.9 CM2
BH CV ECHO MEAS - TR MAX PG: 16 MMHG
BH CV ECHO MEAS - TR MAX VEL: 200.2 CM/SEC
BH CV ECHO MEASUREMENTS AVERAGE E/E' RATIO: 6.04
BH CV VAS BP RIGHT ARM: NORMAL MMHG
BH CV XLRA - RV BASE: 2.3 CM
BH CV XLRA - RV MID: 2.2 CM
BH CV XLRA - TDI S': 8.3 CM/SEC
BILIRUB UR QL STRIP: NEGATIVE
BODY TEMPERATURE: 37 C
BUN BLD-MCNC: 12 MG/DL (ref 6–20)
BUN/CREAT SERPL: 17.1 (ref 7–25)
CALCIUM SPEC-SCNC: 9.1 MG/DL (ref 8.6–10.5)
CHLORIDE SERPL-SCNC: 103 MMOL/L (ref 98–107)
CHOLEST SERPL-MCNC: 205 MG/DL (ref 0–200)
CLARITY UR: CLEAR
CO2 BLDA-SCNC: 22.9 MMOL/L (ref 22–33)
CO2 SERPL-SCNC: 19 MMOL/L (ref 22–29)
COHGB MFR BLD: 0.9 % (ref 0–2)
COLOR UR: YELLOW
CREAT BLD-MCNC: 0.7 MG/DL (ref 0.57–1)
CRP SERPL-MCNC: 0.9 MG/DL (ref 0–0.5)
DEPRECATED RDW RBC AUTO: 41.2 FL (ref 37–54)
DEPRECATED RDW RBC AUTO: 42.9 FL (ref 37–54)
EOSINOPHIL # BLD AUTO: 0.01 10*3/MM3 (ref 0–0.4)
EOSINOPHIL # BLD MANUAL: 0 10*3/MM3 (ref 0–0.4)
EOSINOPHIL NFR BLD AUTO: 0.1 % (ref 0.3–6.2)
EOSINOPHIL NFR BLD MANUAL: 0 % (ref 0.3–6.2)
EPAP: 0
ERYTHROCYTE [DISTWIDTH] IN BLOOD BY AUTOMATED COUNT: 12.5 % (ref 12.3–15.4)
ERYTHROCYTE [DISTWIDTH] IN BLOOD BY AUTOMATED COUNT: 12.6 % (ref 12.3–15.4)
ERYTHROCYTE [SEDIMENTATION RATE] IN BLOOD: 16 MM/HR (ref 0–30)
GFR SERPL CREATININE-BSD FRML MDRD: 86 ML/MIN/1.73
GLUCOSE BLD-MCNC: 135 MG/DL (ref 65–99)
GLUCOSE UR STRIP-MCNC: NEGATIVE MG/DL
HBA1C MFR BLD: 5.4 % (ref 4.8–5.6)
HCO3 BLDA-SCNC: 21.8 MMOL/L (ref 20–26)
HCT VFR BLD AUTO: 44.3 % (ref 34–46.6)
HCT VFR BLD AUTO: 45.5 % (ref 34–46.6)
HCT VFR BLD CALC: 44.4 %
HDLC SERPL-MCNC: 91 MG/DL (ref 40–60)
HGB BLD-MCNC: 14.8 G/DL (ref 12–15.9)
HGB BLD-MCNC: 15.9 G/DL (ref 12–15.9)
HGB BLDA-MCNC: 14.5 G/DL (ref 14–18)
HGB UR QL STRIP.AUTO: NEGATIVE
HOROWITZ INDEX BLD+IHG-RTO: 21 %
IMM GRANULOCYTES # BLD AUTO: 0.04 10*3/MM3 (ref 0–0.05)
IMM GRANULOCYTES NFR BLD AUTO: 0.3 % (ref 0–0.5)
INR PPP: 1.01 (ref 0.85–1.16)
IPAP: 0
KETONES UR QL STRIP: ABNORMAL
LDLC SERPL CALC-MCNC: 99 MG/DL (ref 0–100)
LDLC/HDLC SERPL: 1.09 {RATIO}
LEFT ATRIUM VOLUME INDEX: 21.5 ML/M^2
LEFT ATRIUM VOLUME: 39 ML
LEUKOCYTE ESTERASE UR QL STRIP.AUTO: NEGATIVE
LYMPHOCYTES # BLD AUTO: 1.87 10*3/MM3 (ref 0.7–3.1)
LYMPHOCYTES # BLD MANUAL: 0.64 10*3/MM3 (ref 0.7–3.1)
LYMPHOCYTES NFR BLD AUTO: 13.5 % (ref 19.6–45.3)
LYMPHOCYTES NFR BLD MANUAL: 3 % (ref 5–12)
LYMPHOCYTES NFR BLD MANUAL: 6 % (ref 19.6–45.3)
MAGNESIUM SERPL-MCNC: 2.6 MG/DL (ref 1.6–2.6)
MAXIMAL PREDICTED HEART RATE: 162 BPM
MCH RBC QN AUTO: 30.8 PG (ref 26.6–33)
MCH RBC QN AUTO: 31.4 PG (ref 26.6–33)
MCHC RBC AUTO-ENTMCNC: 33.4 G/DL (ref 31.5–35.7)
MCHC RBC AUTO-ENTMCNC: 34.9 G/DL (ref 31.5–35.7)
MCV RBC AUTO: 89.9 FL (ref 79–97)
MCV RBC AUTO: 92.3 FL (ref 79–97)
METHGB BLD QL: 1 % (ref 0–1.5)
MODALITY: ABNORMAL
MONOCYTES # BLD AUTO: 0.13 10*3/MM3 (ref 0.1–0.9)
MONOCYTES # BLD AUTO: 0.32 10*3/MM3 (ref 0.1–0.9)
MONOCYTES NFR BLD AUTO: 0.9 % (ref 5–12)
NEUTROPHILS # BLD AUTO: 11.77 10*3/MM3 (ref 1.7–7)
NEUTROPHILS # BLD AUTO: 9.76 10*3/MM3 (ref 1.7–7)
NEUTROPHILS NFR BLD AUTO: 85.1 % (ref 42.7–76)
NEUTROPHILS NFR BLD MANUAL: 91 % (ref 42.7–76)
NITRITE UR QL STRIP: NEGATIVE
NOTE: ABNORMAL
NRBC BLD AUTO-RTO: 0 /100 WBC (ref 0–0.2)
NT-PROBNP SERPL-MCNC: 58.2 PG/ML (ref 5–900)
OXYHGB MFR BLDV: 95.1 % (ref 94–99)
PAW @ PEAK INSP FLOW SETTING VENT: 0 CMH2O
PCO2 BLDA: 35.2 MM HG (ref 35–45)
PCO2 TEMP ADJ BLD: 35.2 MM HG (ref 35–45)
PH BLDA: 7.4 PH UNITS (ref 7.35–7.45)
PH UR STRIP.AUTO: 8 [PH] (ref 5–8)
PH, TEMP CORRECTED: 7.4 PH UNITS
PLAT MORPH BLD: NORMAL
PLATELET # BLD AUTO: 341 10*3/MM3 (ref 140–450)
PLATELET # BLD AUTO: 345 10*3/MM3 (ref 140–450)
PMV BLD AUTO: 9.5 FL (ref 6–12)
PMV BLD AUTO: 9.7 FL (ref 6–12)
PO2 BLDA: 84.4 MM HG (ref 83–108)
PO2 TEMP ADJ BLD: 84.4 MM HG (ref 83–108)
POTASSIUM BLD-SCNC: 4.1 MMOL/L (ref 3.5–5.2)
PROT UR QL STRIP: NEGATIVE
PROTHROMBIN TIME: 13 SECONDS (ref 11.5–14)
RBC # BLD AUTO: 4.8 10*6/MM3 (ref 3.77–5.28)
RBC # BLD AUTO: 5.06 10*6/MM3 (ref 3.77–5.28)
RBC MORPH BLD: NORMAL
SARS-COV-2 RNA PNL SPEC NAA+PROBE: NOT DETECTED
SODIUM BLD-SCNC: 137 MMOL/L (ref 136–145)
SP GR UR STRIP: 1.05 (ref 1–1.03)
STRESS TARGET HR: 138 BPM
TOTAL RATE: 0 BREATHS/MINUTE
TRIGL SERPL-MCNC: 74 MG/DL (ref 0–150)
TROPONIN T SERPL-MCNC: <0.01 NG/ML (ref 0–0.03)
TROPONIN T SERPL-MCNC: <0.01 NG/ML (ref 0–0.03)
TSH SERPL DL<=0.05 MIU/L-ACNC: 0.65 UIU/ML (ref 0.27–4.2)
UFH PPP CHRO-ACNC: 0.1 IU/ML (ref 0.3–0.7)
UFH PPP CHRO-ACNC: 0.48 IU/ML (ref 0.3–0.7)
UROBILINOGEN UR QL STRIP: ABNORMAL
VLDLC SERPL-MCNC: 14.8 MG/DL
WBC MORPH BLD: NORMAL
WBC NRBC COR # BLD: 10.72 10*3/MM3 (ref 3.4–10.8)
WBC NRBC COR # BLD: 13.84 10*3/MM3 (ref 3.4–10.8)

## 2020-06-05 PROCEDURE — 83036 HEMOGLOBIN GLYCOSYLATED A1C: CPT | Performed by: INTERNAL MEDICINE

## 2020-06-05 PROCEDURE — 80061 LIPID PANEL: CPT | Performed by: INTERNAL MEDICINE

## 2020-06-05 PROCEDURE — 99222 1ST HOSP IP/OBS MODERATE 55: CPT | Performed by: INTERNAL MEDICINE

## 2020-06-05 PROCEDURE — 84484 ASSAY OF TROPONIN QUANT: CPT | Performed by: INTERNAL MEDICINE

## 2020-06-05 PROCEDURE — 93005 ELECTROCARDIOGRAM TRACING: CPT | Performed by: INTERNAL MEDICINE

## 2020-06-05 PROCEDURE — 99223 1ST HOSP IP/OBS HIGH 75: CPT | Performed by: INTERNAL MEDICINE

## 2020-06-05 PROCEDURE — 36600 WITHDRAWAL OF ARTERIAL BLOOD: CPT

## 2020-06-05 PROCEDURE — 25010000002 HEPARIN (PORCINE) 25000-0.45 UT/250ML-% SOLUTION: Performed by: EMERGENCY MEDICINE

## 2020-06-05 PROCEDURE — 86140 C-REACTIVE PROTEIN: CPT | Performed by: INTERNAL MEDICINE

## 2020-06-05 PROCEDURE — 85025 COMPLETE CBC W/AUTO DIFF WBC: CPT | Performed by: EMERGENCY MEDICINE

## 2020-06-05 PROCEDURE — 82805 BLOOD GASES W/O2 SATURATION: CPT

## 2020-06-05 PROCEDURE — 25010000002 LORAZEPAM PER 2 MG: Performed by: EMERGENCY MEDICINE

## 2020-06-05 PROCEDURE — 80048 BASIC METABOLIC PNL TOTAL CA: CPT | Performed by: INTERNAL MEDICINE

## 2020-06-05 PROCEDURE — 85652 RBC SED RATE AUTOMATED: CPT | Performed by: INTERNAL MEDICINE

## 2020-06-05 PROCEDURE — 85027 COMPLETE CBC AUTOMATED: CPT | Performed by: INTERNAL MEDICINE

## 2020-06-05 PROCEDURE — 83735 ASSAY OF MAGNESIUM: CPT | Performed by: INTERNAL MEDICINE

## 2020-06-05 PROCEDURE — 93306 TTE W/DOPPLER COMPLETE: CPT

## 2020-06-05 PROCEDURE — 94799 UNLISTED PULMONARY SVC/PX: CPT

## 2020-06-05 PROCEDURE — 84443 ASSAY THYROID STIM HORMONE: CPT | Performed by: INTERNAL MEDICINE

## 2020-06-05 PROCEDURE — 85520 HEPARIN ASSAY: CPT | Performed by: EMERGENCY MEDICINE

## 2020-06-05 PROCEDURE — 25010000002 HEPARIN (PORCINE) PER 1000 UNITS: Performed by: EMERGENCY MEDICINE

## 2020-06-05 PROCEDURE — 25010000002 ENOXAPARIN PER 10 MG: Performed by: INTERNAL MEDICINE

## 2020-06-05 PROCEDURE — 85520 HEPARIN ASSAY: CPT

## 2020-06-05 PROCEDURE — 85730 THROMBOPLASTIN TIME PARTIAL: CPT | Performed by: EMERGENCY MEDICINE

## 2020-06-05 PROCEDURE — 87635 SARS-COV-2 COVID-19 AMP PRB: CPT | Performed by: INTERNAL MEDICINE

## 2020-06-05 PROCEDURE — 93306 TTE W/DOPPLER COMPLETE: CPT | Performed by: INTERNAL MEDICINE

## 2020-06-05 PROCEDURE — 85007 BL SMEAR W/DIFF WBC COUNT: CPT | Performed by: INTERNAL MEDICINE

## 2020-06-05 PROCEDURE — 85610 PROTHROMBIN TIME: CPT | Performed by: EMERGENCY MEDICINE

## 2020-06-05 PROCEDURE — 94640 AIRWAY INHALATION TREATMENT: CPT

## 2020-06-05 PROCEDURE — 81003 URINALYSIS AUTO W/O SCOPE: CPT | Performed by: INTERNAL MEDICINE

## 2020-06-05 PROCEDURE — 83880 ASSAY OF NATRIURETIC PEPTIDE: CPT | Performed by: FAMILY MEDICINE

## 2020-06-05 RX ORDER — MORPHINE SULFATE 2 MG/ML
1 INJECTION, SOLUTION INTRAMUSCULAR; INTRAVENOUS EVERY 4 HOURS PRN
Status: DISCONTINUED | OUTPATIENT
Start: 2020-06-05 | End: 2020-06-06 | Stop reason: HOSPADM

## 2020-06-05 RX ORDER — AMITRIPTYLINE HYDROCHLORIDE 50 MG/1
50 TABLET, FILM COATED ORAL NIGHTLY PRN
Status: DISCONTINUED | OUTPATIENT
Start: 2020-06-05 | End: 2020-06-06 | Stop reason: HOSPADM

## 2020-06-05 RX ORDER — ASPIRIN 81 MG/1
324 TABLET, CHEWABLE ORAL ONCE
Status: COMPLETED | OUTPATIENT
Start: 2020-06-05 | End: 2020-06-05

## 2020-06-05 RX ORDER — SODIUM CHLORIDE 9 MG/ML
75 INJECTION, SOLUTION INTRAVENOUS CONTINUOUS
Status: ACTIVE | OUTPATIENT
Start: 2020-06-05 | End: 2020-06-05

## 2020-06-05 RX ORDER — ESTRADIOL 0.05 MG/D
1 FILM, EXTENDED RELEASE TRANSDERMAL 2 TIMES WEEKLY
Status: DISCONTINUED | OUTPATIENT
Start: 2020-06-08 | End: 2020-06-06 | Stop reason: HOSPADM

## 2020-06-05 RX ORDER — NITROGLYCERIN 0.4 MG/1
0.4 TABLET SUBLINGUAL ONCE
Status: COMPLETED | OUTPATIENT
Start: 2020-06-05 | End: 2020-06-05

## 2020-06-05 RX ORDER — HEPARIN SODIUM 1000 [USP'U]/ML
30 INJECTION, SOLUTION INTRAVENOUS; SUBCUTANEOUS AS NEEDED
Status: DISCONTINUED | OUTPATIENT
Start: 2020-06-05 | End: 2020-06-05 | Stop reason: SDUPTHER

## 2020-06-05 RX ORDER — CLOPIDOGREL BISULFATE 75 MG/1
600 TABLET ORAL ONCE
Status: COMPLETED | OUTPATIENT
Start: 2020-06-05 | End: 2020-06-05

## 2020-06-05 RX ORDER — SODIUM CHLORIDE 0.9 % (FLUSH) 0.9 %
10 SYRINGE (ML) INJECTION AS NEEDED
Status: DISCONTINUED | OUTPATIENT
Start: 2020-06-05 | End: 2020-06-06 | Stop reason: HOSPADM

## 2020-06-05 RX ORDER — NALOXONE HCL 0.4 MG/ML
0.4 VIAL (ML) INJECTION
Status: DISCONTINUED | OUTPATIENT
Start: 2020-06-05 | End: 2020-06-06 | Stop reason: HOSPADM

## 2020-06-05 RX ORDER — NITROGLYCERIN 20 MG/100ML
10-50 INJECTION INTRAVENOUS
Status: DISCONTINUED | OUTPATIENT
Start: 2020-06-05 | End: 2020-06-05

## 2020-06-05 RX ORDER — HEPARIN SODIUM 10000 [USP'U]/100ML
12 INJECTION, SOLUTION INTRAVENOUS
Status: DISCONTINUED | OUTPATIENT
Start: 2020-06-05 | End: 2020-06-05

## 2020-06-05 RX ORDER — CLOPIDOGREL BISULFATE 75 MG/1
75 TABLET ORAL DAILY
Status: DISCONTINUED | OUTPATIENT
Start: 2020-06-06 | End: 2020-06-06 | Stop reason: HOSPADM

## 2020-06-05 RX ORDER — HEPARIN SODIUM 1000 [USP'U]/ML
4000 INJECTION, SOLUTION INTRAVENOUS; SUBCUTANEOUS ONCE
Status: COMPLETED | OUTPATIENT
Start: 2020-06-05 | End: 2020-06-05

## 2020-06-05 RX ORDER — ASPIRIN 81 MG/1
81 TABLET ORAL DAILY
Status: DISCONTINUED | OUTPATIENT
Start: 2020-06-06 | End: 2020-06-06 | Stop reason: HOSPADM

## 2020-06-05 RX ORDER — SODIUM CHLORIDE 0.9 % (FLUSH) 0.9 %
10 SYRINGE (ML) INJECTION EVERY 12 HOURS SCHEDULED
Status: DISCONTINUED | OUTPATIENT
Start: 2020-06-05 | End: 2020-06-06 | Stop reason: HOSPADM

## 2020-06-05 RX ORDER — HEPARIN SODIUM 1000 [USP'U]/ML
60 INJECTION, SOLUTION INTRAVENOUS; SUBCUTANEOUS AS NEEDED
Status: DISCONTINUED | OUTPATIENT
Start: 2020-06-05 | End: 2020-06-05 | Stop reason: SDUPTHER

## 2020-06-05 RX ORDER — PRAVASTATIN SODIUM 40 MG
40 TABLET ORAL NIGHTLY
Status: DISCONTINUED | OUTPATIENT
Start: 2020-06-05 | End: 2020-06-06 | Stop reason: HOSPADM

## 2020-06-05 RX ORDER — ALBUTEROL SULFATE 90 UG/1
2 AEROSOL, METERED RESPIRATORY (INHALATION) ONCE
Status: COMPLETED | OUTPATIENT
Start: 2020-06-05 | End: 2020-06-05

## 2020-06-05 RX ORDER — LORAZEPAM 2 MG/ML
0.5 INJECTION INTRAMUSCULAR ONCE
Status: COMPLETED | OUTPATIENT
Start: 2020-06-05 | End: 2020-06-05

## 2020-06-05 RX ORDER — LEVETIRACETAM 500 MG/1
500 TABLET ORAL EVERY 12 HOURS SCHEDULED
Status: DISCONTINUED | OUTPATIENT
Start: 2020-06-05 | End: 2020-06-06 | Stop reason: HOSPADM

## 2020-06-05 RX ORDER — HEPARIN SODIUM 10000 [USP'U]/100ML
12 INJECTION, SOLUTION INTRAVENOUS
Status: DISCONTINUED | OUTPATIENT
Start: 2020-06-05 | End: 2020-06-05 | Stop reason: SDUPTHER

## 2020-06-05 RX ADMIN — ASPIRIN 81 MG 324 MG: 81 TABLET ORAL at 05:44

## 2020-06-05 RX ADMIN — HEPARIN SODIUM 4000 UNITS: 1000 INJECTION, SOLUTION INTRAVENOUS; SUBCUTANEOUS at 01:18

## 2020-06-05 RX ADMIN — METOPROLOL TARTRATE 25 MG: 25 TABLET, FILM COATED ORAL at 09:48

## 2020-06-05 RX ADMIN — PRAVASTATIN SODIUM 40 MG: 40 TABLET ORAL at 20:18

## 2020-06-05 RX ADMIN — HEPARIN SODIUM 12 UNITS/KG/HR: 10000 INJECTION, SOLUTION INTRAVENOUS at 01:20

## 2020-06-05 RX ADMIN — SODIUM CHLORIDE 75 ML/HR: 9 INJECTION, SOLUTION INTRAVENOUS at 05:45

## 2020-06-05 RX ADMIN — PROGESTERONE 200 MG: 100 CAPSULE ORAL at 20:18

## 2020-06-05 RX ADMIN — LEVETIRACETAM 500 MG: 500 TABLET ORAL at 20:18

## 2020-06-05 RX ADMIN — ENOXAPARIN SODIUM 40 MG: 40 INJECTION SUBCUTANEOUS at 20:18

## 2020-06-05 RX ADMIN — CLOPIDOGREL BISULFATE 600 MG: 75 TABLET ORAL at 05:44

## 2020-06-05 RX ADMIN — ALBUTEROL SULFATE 2 PUFF: 90 AEROSOL, METERED RESPIRATORY (INHALATION) at 00:55

## 2020-06-05 RX ADMIN — LORAZEPAM 0.5 MG: 2 INJECTION INTRAMUSCULAR; INTRAVENOUS at 01:18

## 2020-06-05 RX ADMIN — NITROGLYCERIN 0.4 MG: 0.4 TABLET SUBLINGUAL at 01:19

## 2020-06-05 RX ADMIN — LEVETIRACETAM 500 MG: 500 TABLET ORAL at 09:48

## 2020-06-05 RX ADMIN — METOPROLOL TARTRATE 25 MG: 25 TABLET, FILM COATED ORAL at 20:18

## 2020-06-05 NOTE — PROGRESS NOTES
[facility] Medicine Services  ADMISSION FOLLOW-UP NOTE          Patient admitted after midnight, H&P by my partner performed earlier on today's date reviewed.  Interim findings, labs, and charting also reviewed.        The [facility] Hospital Problem List has been managed and updated to include any new diagnoses:  Active Hospital Problems    Diagnosis  POA   • **Dyspnea [R06.00]  Yes   • Unstable angina (CMS/HCC) [I20.0]  Yes   • Leukocytosis [D72.829]  Yes   • Lactic acidosis [E87.2]  Yes   • Laryngitis [J04.0]  Yes      Resolved Hospital Problems   No resolved problems to display.         ADDITIONAL PLAN:  - detailed assessment and plan form admission reviewed  -Cardiology consult note reviewed  -Pulmonary consult requested by cardiology  -Echocardiogram to assess for pulmonary hypertension    Patient symptoms appear out of proportion to her findings thus far, continued work-up in progress.    Appreciate cardiology consult and management.      Electronically signed by Temitope Lance MD, 06/05/20, 1:13 PM.

## 2020-06-05 NOTE — PLAN OF CARE
Pt states she is feeling better this afternoon and isn't as short of breath. Waiting for Pulmonary and cardiac results

## 2020-06-05 NOTE — H&P
"    [facility] Medicine Services  HISTORY AND PHYSICAL    Patient Name: Delphine Das  : 1960  MRN: [id number]  Primary Care Physician: Provider, No Known  Date of admission: 2020      Subjective   Subjective     Chief Complaint:  Shortness of breath    HPI:  Delphine Das is a 59 y.o. female who c/o shortness of breath since beginning of April.  Has had sore throat w/ neg covid x 2, neg strep and neg flu.  Has seen pezzi w/ EGD last year but did not feel laryngitis was caused by reflux. ENT Dr. Montano evaluated pt on  w/ scope and stated one of vocal cords inflamed but otherwise negative and recommended she see pulmonology.  Pt primarily reports allergy sx, ear pain, scratchy throat and hoarseness.  Her shortness of breath is worsened when she speaks.  Has recently been on steroids.  No f/c.  Pt does note intermittent sharp left sided chest pain w/ radiation to upper back and neck.  Pt states her cp is 10/10 at its worse and has been present for 2-3 days now.  Has had diaphoresis as well.  Has also had nausea.  CP relieved w/ ntg in ER.  Pt states her shortness of breath or SALINAS has worsened over last couple of weeks and that she \"becomes winded when walks stairs\".  Pt notes that sore throat was the initiating sx of all the above.  ??had mouth ulcers.  No rashes or skin lesions other than infected basal cell.  Felt better on steroids but then sx returned.      CCTA negative for PE in ER.    Review of Systems      All other systems reviewed and are negative.     Personal History     Past Medical History:   Diagnosis Date   • Colon polyp    • GERD (gastroesophageal reflux disease)        Past Surgical History:   Procedure Laterality Date   • COLONOSCOPY     • UPPER GASTROINTESTINAL ENDOSCOPY         Family History: family history includes Colon polyps in her mother and sister. Otherwise pertinent FHx was reviewed and unremarkable.     Social History:  reports that she has never " smoked. She has never used smokeless tobacco. She reports that she drinks alcohol. She reports that she does not use drugs.  Social History     Social History Narrative   • Not on file       Medications:  Available home medication information reviewed.    (Not in a hospital admission)    No Known Allergies    Objective   Objective     Vital Signs:   Temp:  [99.1 °F (37.3 °C)] 99.1 °F (37.3 °C)  Heart Rate:  [] 103  Resp:  [20-22] 20  BP: (106-129)/(68-88) 115/71        Physical Exam   Gen; ill appearing; diaphoretic; hoarse voice w/ short sentences  Heent; perrla, eomi, pasty mm  Cv; rr w/ tachycardia, no mrg  L; ctab, no wheeze/crackles. Tachypnea  Abd; soft, +bs, ntnd, no r/g  Ext; no cce, 2+ pulses  Skin; cdi, feet cold to touch (chronic per pt)  Neuro; grossly intact  Psych; mood and affect appropriate    Results Reviewed:  I have personally reviewed current lab and radiology data.    Results from last 7 days   Lab Units 06/05/20  0054   WBC 10*3/mm3 13.84*   HEMOGLOBIN g/dL 15.9   HEMATOCRIT % 45.5   PLATELETS 10*3/mm3 341   INR  1.01     Results from last 7 days   Lab Units 06/04/20  2318 06/04/20 2004 06/04/20  1812   SODIUM mmol/L  --   --  139   POTASSIUM mmol/L  --   --  4.5   CHLORIDE mmol/L  --   --  100   CO2 mmol/L  --   --  23.0   BUN mg/dL  --   --  12   CREATININE mg/dL  --   --  1.00   GLUCOSE mg/dL  --   --  110*   CALCIUM mg/dL  --   --  9.3   ALT (SGPT) U/L  --   --  25   AST (SGOT) U/L  --   --  24   TROPONIN T ng/mL  --  <0.010 <0.010   PROBNP pg/mL  --   --  25.0   LACTATE mmol/L 1.6  --  2.5*   PROCALCITONIN ng/mL  --   --  0.05*     Estimated Creatinine Clearance: 57.6 mL/min (by C-G formula based on SCr of 1 mg/dL).  Brief Urine Lab Results     None        Imaging Results (Last 24 Hours)     Procedure Component Value Units Date/Time    CT Angiogram Chest [[id number]] Collected:  06/04/20 2252     Updated:  06/04/20 2254    Narrative:       CTA Chest    INDICATION:   Shortness of air  since April. Left-sided chest pain.    TECHNIQUE:   CT angiogram of the chest with IV contrast. 3-D reconstructions were obtained and reviewed.   Radiation dose reduction techniques included automated exposure control or exposure modulation based on body size. Count of known CT and cardiac nuc med studies  performed in previous 12 months: 0.     COMPARISON:   None available.    FINDINGS:   No pulmonary embolism or aortic dissection is identified. There is no pleural or pericardial effusion. No adenopathy is seen. There is a small hiatal hernia. Images of the upper abdomen show a moderate stool burden in the colon. Bilateral breast implants  are present.    There is dependent atelectasis in both lower lobes. There is also some mild atelectasis in the lingula. Lungs are otherwise clear. No CT findings present to indicate pneumonia. Note: CT may be negative in the earliest stages of COVID-19.      Impression:         1. No PE or aortic dissection.  2. Other than some mild atelectasis in the lower lobes and lingula, no active disease in the chest.  3. Small hiatal hernia.    Signer Name: Saji Guevara MD   Signed: 6/4/2020 10:52 PM   Workstation Name: [facility]    [facility]    XR Chest 1 View [[id number]] Collected:  06/04/20 1839     Updated:  06/04/20 1913    Narrative:          EXAMINATION: XR CHEST 1 VW-06/04/2020:      INDICATION: SOA, triage protocol.      COMPARISON: NONE.     FINDINGS: Cardiac size within normal limits. Pulmonary vascularity  within normal limits. No focal opacification or consolidation. No  pneumothorax or pleural effusion. Degenerative changes of the spine.           Impression:       No acute cardiopulmonary process.     D:  06/04/2020  E:  06/04/2020     This report was finalized on 6/4/2020 7:10 PM by Dr. Shawn Botello.                Assessment/Plan   Assessment & Plan     Active Hospital Problems    Diagnosis POA   • Unstable angina (CMS/HCC) [I20.0] Yes   •  Leukocytosis [D72.829] Yes   • Lactic acidosis [E87.2] Yes   • Dyspnea [R06.00] Yes   • Laryngitis [J04.0] Yes       60 y/o female w/ minimal pmhx other than GERD who has had 2 months of progressively worsening shortness of breath which started w/ laryngitis.  Pt has had negative flu, RSS, covid x 2.  Pt has had EGD last year w/ Dr. Bernard who did not feel her gerd would cause this degree of sx.  Has had scope by ENT this week w/ inflamed vocal cord but otherwise normal.  Pt now w/ significant SALINAS, chest pain for past 2-3 days, diaphoresis, nausea and EKG changes in ER.     1. Unstable angina; pt w/ chest pain associated with her shortness of breath which she rates 10/10 and has been present for past 2-3 days.  She also has ekg changes from 1st to the 2nd ekg's performed in ER.  Her chest pain is relieved w/ ntg and her sx were also improved w/ steroids.  Given odd presentation of laryngitis, then dyspnea and now chest pain would consider viral myocarditis in differential but will tx as unstable angina w/ asa, statin, plavix,  Hep gtt, ntg gtt, morphine and consult cardiology this a.m.  CCTA negative  2. Leukocytosis; likely from steroids.  Check UA.    3. Lactic acidosis; has had poor po intake.  Has resolved already.  Gentle hydration.    **rapid covid r/o in event pt needs cath  DVT prophylaxis:  Hep gtt      Admission Communication  Due to current limited visitation policies, an attempt will be made to update patient's identified best point-of-contact(s) at admission to discuss plan of care.   Contact:    Relation:       Notes (if applicable): family at bedside in ER         CODE STATUS:  full  Code Status and Medical Interventions:   Ordered at: 06/05/20 0233     Code Status:    CPR     Medical Interventions (Level of Support Prior to Arrest):    Full       Admission Status:  I believe this patient meets INPATIENT status due to unstable angina w/ ekg changes.  I feel patient’s risk for adverse outcomes and need  for care warrant INPATIENT evaluation and I predict the patient’s care encounter to likely last beyond 2 midnights.      Electronically signed by Elisa Marin MD, 06/05/20, 1:15 AM.

## 2020-06-05 NOTE — PLAN OF CARE
Problem: Patient Care Overview  Goal: Plan of Care Review  Outcome: Ongoing (interventions implemented as appropriate)  Flowsheets (Taken 6/5/2020 0339)  Progress: no change  Plan of Care Reviewed With: patient  Outcome Summary: Pt admitted from the ER with EKG changes. Denies chest pain at this time. O2 Sats 96% on room air. Does have shortness of breat at this time. Heparin drip infusing at 12 units/kg/hr. VSS. Will continue to monitor. 0340 6/5/2020      "Subjective   Chief Complaint   Patient presents with   • Follow-up     2 wk      Kermit Corley is a 65 y.o. male.   Follow-up (2 wk )    History of Present Illness     This gentleman has COPD probably emphysema with persistent tobacco use.  He is followed by pulmonology with Dr Lee.  He was seen yesterday and treated with depo-medrol injection.   He is currently managed with albuterol, nebulizer, singulair, spiriva and belvespi inahler.  He has complaints today of rhinorrhea, postnasal drainage, cough, shortness of breath, sinus pressure and pain.      Due for a LDCT for lung cancer screening    Colonoscopy done at [facility] - medical release signed    Tetanus - unknown  Shingles - unknown    The following portions of the patient's history were reviewed and updated as appropriate: allergies, current medications, past family history, past medical history, past social history, past surgical history and problem list.    Review of Systems   Constitutional: Negative for appetite change, chills, fatigue and fever.   HENT: Positive for congestion, postnasal drip, rhinorrhea, sinus pain, sinus pressure and sore throat. Negative for ear pain.    Eyes: Negative for pain.   Respiratory: Positive for cough. Negative for shortness of breath.    Cardiovascular: Negative for chest pain and palpitations.   Gastrointestinal: Negative for abdominal pain, constipation, diarrhea and nausea.   Genitourinary: Negative for dysuria.   Musculoskeletal: Negative for back pain, joint swelling and neck pain.   Skin: Negative for rash.   Neurological: Negative for dizziness and headaches.       Objective   /80   Pulse 111   Temp 97.4 °F (36.3 °C)   Ht 175.3 cm (69.02\")   Wt 58.1 kg (128 lb)   SpO2 96%   BMI 18.89 kg/m²   Physical Exam   Constitutional: He is oriented to person, place, and time. He appears well-developed and well-nourished.   HENT:   Head: Normocephalic and atraumatic.   Nose: Sinus tenderness " present. Right sinus exhibits maxillary sinus tenderness. Left sinus exhibits maxillary sinus tenderness.   Mouth/Throat: Posterior oropharyngeal erythema present.   Posterior rhinorrhea   Eyes: Pupils are equal, round, and reactive to light.   Neck: Normal range of motion. Neck supple.   Cardiovascular: Regular rhythm and normal heart sounds.  Tachycardia present.    Pulmonary/Chest: Effort normal. No respiratory distress. He has decreased breath sounds. He has wheezes. He has no rales.   Abdominal: Soft. Bowel sounds are normal.   Musculoskeletal: Normal range of motion.   Neurological: He is alert and oriented to person, place, and time.   Skin: Skin is warm and dry.   Psychiatric: He has a normal mood and affect.   Nursing note and vitals reviewed.      Assessment/Plan   Problems Addressed this Visit        Respiratory    COPD (chronic obstructive pulmonary disease)    Relevant Medications    ipratropium-albuterol (DUO-NEB) nebulizer solution 3 mL (Start on 4/5/2018 11:45 AM)    cetirizine (zyrTEC) 10 MG tablet    predniSONE (DELTASONE) 20 MG tablet       Other    Tobacco dependence syndrome      Other Visit Diagnoses     Acute non-recurrent maxillary sinusitis    -  Primary    Relevant Medications    cetirizine (zyrTEC) 10 MG tablet    predniSONE (DELTASONE) 20 MG tablet    Encounter for screening for lung cancer        Relevant Orders    CT Chest Low Dose Wo        Colonoscopy [facility]    LDCT ordered    duoneb in office today  Start prednisone x 5 days  Provided an injection of depo-medrol yesterday by Dr Rosa katzyrtec  Call if symptoms worsen or fail to improve    I advised the patient of the risks in continuing to use tobacco, and I provided this patient with smoking cessation educational materials.    During this visit, I spent < 3 minutes counseling the patient regarding smoking cessation.    Discussed the patient's BMI with him. BMI is within normal parameters. No follow-up  required.    Discussed vaccinations that are needed    Recheck in 4 weeks

## 2020-06-05 NOTE — PROGRESS NOTES
Discharge Planning Assessment  [facility]     Patient Name: Delphine Das  MRN: [id number]  Today's Date: 6/5/2020    Admit Date: 6/4/2020    Discharge Needs Assessment     Row Name 06/05/20 1151       Living Environment    Lives With  spouse Pt resides in [facility]    Name(s) of Who Lives With Patient  - Aubrey    Current Living Arrangements  home/apartment/condo    Primary Care Provided by  self    Provides Primary Care For  no one    Family Caregiver if Needed  spouse;child(yanet), adult    Family Caregiver Names  - Aubrey  daughters- Marjorie and Joana    Quality of Family Relationships  helpful;involved;supportive    Able to Return to Prior Arrangements  yes       Resource/Environmental Concerns    Resource/Environmental Concerns  none       Transition Planning    Patient/Family Anticipated Services at Transition  none    Transportation Anticipated  family or friend will provide       Discharge Needs Assessment    Readmission Within the Last 30 Days  no previous admission in last 30 days    Concerns to be Addressed  denies needs/concerns at this time;discharge planning    Equipment Currently Used at Home  none    Anticipated Changes Related to Illness  none    Provided Post Acute Provider List?  N/A        Discharge Plan     Row Name 06/05/20 1152       Plan    Plan  home    Provided Post Acute Provider Quality & Resource List?  N/A    Patient/Family in Agreement with Plan  yes    Plan Comments  CM spoke with pt and daughter Marjorie at bedside. Pt resides in [facility] with her  Aubrey. Pt reports she is independent of adls and denies use of DME. PT has weekly IV infusions for treatment of migraines. Pt is not current with home health services. Pt denies needs at this time and plans to return home with assistance from her family. CM will continue to follow.     Final Discharge Disposition Code  01 - home or self-care        Destination      Coordination has not been started for this  encounter.      Durable Medical Equipment      Coordination has not been started for this encounter.      Dialysis/Infusion      Coordination has not been started for this encounter.      Home Medical Care      Coordination has not been started for this encounter.      Therapy      Coordination has not been started for this encounter.      Community Resources      Coordination has not been started for this encounter.          Demographic Summary     Row Name 06/05/20 1150       General Information    Referral Source  admission list    Reason for Consult  discharge planning    Preferred Language  English    General Information Comments  PCP- Dr. Timur Virgen       Contact Information    Permission Granted to Share Info With          Functional Status     Row Name 06/05/20 1150       Functional Status    Usual Activity Tolerance  good    Current Activity Tolerance  moderate       Functional Status, IADL    Medications  independent    Meal Preparation  independent    Housekeeping  independent    Laundry  independent    Shopping  independent       Employment/    Employment/ Comments  Pt confirms she has [employer] and Medicare secondary, denies concerns or disruption in coverage. Pt has prescription coverage and denies issues obtaining or affording current medications.         Psychosocial    No documentation.       Abuse/Neglect    No documentation.       Legal    No documentation.       Substance Abuse    No documentation.       Patient Forms    No documentation.           Karoline Mohamud RN

## 2020-06-05 NOTE — CONSULTS
Pulmonary Hospital Consult Note     LOS: 0 days   Patient Care Team:  Provider, No Known as PCP - General  Provider, No Known as PCP - Family Medicine  Timur Virgen MD    Chief Complaint   Patient presents with   • Shortness of Breath       Subjective     HPI:  59 y.o. female admitted to the hospitalist service with complaint of shortness of breath since April.  She also has hoarseness.  She reports she was tested as an outpatient for flu, strep throat, COVID-19 and all were negative.  She developed some chest pain in her left chest radiating to her left arm and back prior to this admission.  She has had some nausea.  She reports a history of reflux and that her gastroenterologist, Dr. Bernard, increased her Dexilant to twice daily.  She reports she was active prior to developing migraines 4 years ago.  She has a history of fibromyalgia.  She believes her shortness of breath is improved by bronchodilators and steroids.  She denies nocturnal cough.  She was seen by ENT, Dr. Montano, a few days ago and was told that 1 of her vocal cords was a bit inflamed.    History taken from: patient    Past Medical History:   Diagnosis Date   • Colon polyp    • GERD (gastroesophageal reflux disease)        Past Surgical History:   Procedure Laterality Date   • COLONOSCOPY     • UPPER GASTROINTESTINAL ENDOSCOPY         Family History   Problem Relation Age of Onset   • Colon polyps Mother    • Colon polyps Sister        Social History     Socioeconomic History   • Marital status:      Spouse name: Not on file   • Number of children: Not on file   • Years of education: Not on file   • Highest education level: Not on file   Tobacco Use   • Smoking status: Never Smoker   • Smokeless tobacco: Never Used   Substance and Sexual Activity   • Alcohol use: Yes     Comment: 3-4 drinks a week   • Drug use: No   • Sexual activity: Defer       No Known Allergies    PMH/FH/SocH were reviewed by me and updates were made.     Review of  Systems:    All other systems were reviewed and are negative.  Exceptions are noted in subjective or below.    Objective     Vital Signs  Temp:  [97.4 °F (36.3 °C)-98.1 °F (36.7 °C)] 97.9 °F (36.6 °C)  Heart Rate:  [] 76  Resp:  [16-22] 18  BP: (106-136)/(68-88) 115/78    Physical Exam:     Constitutional:    Alert, cooperative, in no acute distress   Head:    Normocephalic, without obvious abnormality, atraumatic   Eyes:            Lids and lashes normal, conjunctivae and sclerae normal, no   icterus, no pallor, corneas clear, PERRL   ENMT:   Ears appear intact with no abnormalities noted      No oral lesions, no thrush, oral mucosa moist   Neck:   No adenopathy, supple, trachea midline, no thyromegaly, no   carotid bruit, no JVD       Lungs/Resp:     Normal effort, symmetric chest rise, no crepitus, clear to      auscultation bilaterally, no chest wall tenderness.                  Heart/CV:    Regular rhythm and normal rate, normal S1 and S2, no            murmur   Abdomen/GI:     Normal bowel sounds, no masses, no organomegaly, soft        non-tender, non-distended, no guarding, no rebound                tenderness   :     Deferred   Extremities/MSK:   No clubbing, cyanosis or edema.  No deformities.    Pulses:   Pulses palpable and equal bilaterally   Skin:   No bleeding, bruising or rash   Hem/Lymph:   No cervical or supraclavicular adenopathy.    Neurologic:   Moves all extremities with no obvious focal motor deficit.  Cranial nerves 2 - 12 grossly intact             Psychiatric: Normal mood and affect, oriented x 3.      Results Review:     I reviewed the patient's new clinical results.   Results from last 7 days   Lab Units 06/05/20  0837 06/04/20  1812   SODIUM mmol/L 137 139   POTASSIUM mmol/L 4.1 4.5   CHLORIDE mmol/L 103 100   CO2 mmol/L 19.0* 23.0   BUN mg/dL 12 12   CREATININE mg/dL 0.70 1.00   CALCIUM mg/dL 9.1 9.3   BILIRUBIN mg/dL  --  0.4   ALK PHOS U/L  --  97   ALT (SGPT) U/L  --  25    AST (SGOT) U/L  --  24   GLUCOSE mg/dL 135* 110*     Results from last 7 days   Lab Units 06/05/20  0837 06/05/20  0054 06/04/20  1812   WBC 10*3/mm3 10.72 13.84* 14.41*   HEMOGLOBIN g/dL 14.8 15.9 15.3   HEMATOCRIT % 44.3 45.5 45.3   PLATELETS 10*3/mm3 345 341 352   MONOCYTES % % 3.0*  --   --      Results from last 7 days   Lab Units 06/05/20  1732   PH, ARTERIAL pH units 7.400   PO2 ART mm Hg 84.4   PCO2, ARTERIAL mm Hg 35.2   HCO3 ART mmol/L 21.8       I reviewed the patient's new imaging including images and reports.    Echocardiogram 6/5/2020:    · Estimated EF appears to be in the range of 61 - 65%.  · Left ventricular systolic function is normal.  · Left ventricular diastolic dysfunction (grade I) consistent with impaired relaxation.  · Saline test results are positive for right to left atrial level shunt.  · No significant structural or functional valvular disease.    CTA chest 6/4/2020 was reviewed and showed no evidence of pulmonary embolism with some mild basilar atelectasis.  She did have a hiatal hernia.  Radiologist's impression follows:    IMPRESSION:     1. No PE or aortic dissection.  2. Other than some mild atelectasis in the lower lobes and lingula, no active disease in the chest.  3. Small hiatal hernia.    Medication Review:     [START ON 6/6/2020] aspirin 81 mg Oral Daily   [START ON 6/6/2020] clopidogrel 75 mg Oral Daily   enoxaparin 40 mg Subcutaneous Nightly   [START ON 6/8/2020] estradiol 1 patch Transdermal Once per day on Mon Thu   levETIRAcetam 500 mg Oral Q12H   metoprolol tartrate 25 mg Oral Q12H   pravastatin 40 mg Oral Nightly   progesterone 200 mg Oral Nightly   sodium chloride 10 mL Intravenous Q12H          Assessment/Plan       Dyspnea    Unstable angina (CMS/HCC)    Leukocytosis    Lactic acidosis    Laryngitis    59 y.o. female with gastroesophageal reflux disease who has developed dyspnea since April associated with hoarseness.  She is not hypoxemic.  She has a hiatal  hernia on CT scan of the chest with no evidence of pulmonary embolism.  Preliminary echocardiogram report shows a normal left ventricular systolic function, no evidence of pulmonary hypertension, and an atrial level shunt on bubble study.    I do not think the patient's shunt is significant enough to cause her symptoms and the patient has no hypoxemia.  Her symptoms are mainly subjective with dyspnea.  It may be related to her GERD and hiatal hernia.  There is a possibility she has vocal cord dysfunction associated with her reflux.    Plan:  1.  Recommend following up with her gastroenterologist concerning her reflux and hiatal hernia and the fact that she seems to be persistently symptomatic.  2.  Could consider outpatient speech therapy for evaluation and treatment of suspected vocal cord dysfunction.  3.  Could consider closure of PFO given the patient's history of migraines.  Defer to cardiology/primary.  4.  Check arterial blood gas (done).  5.  Obtain PFTs for completeness, although I do not suspect there to be a significant abnormality.  The patient has been tested negative for COVID which is why I ordered this as an inpatient.    James Branch MD  06/05/20  18:18          • Please note that portions of this note were completed with [employer] - a voice recognition program.

## 2020-06-05 NOTE — CONSULTS
[facility] at [facility]  Cardiovascular Consultation Note    Reason for consultation: #1 chest pain #2 shortness of breath    History of Present Illness:  59-year-old postmenopausal female on hormonal therapy and on Keppra who presents with shortness of breath and chest pain.  The patient has been short of breath since April.  She has hoarseness every day and wheezes on occasion.  She is not a smoker and there is no obvious family history of ischemic heart disease.  Patient has not been on any recent long car ride.  Apparently she has had a evaluation of her vocal cords and 1 of them was inflamed.  Despite taking PPIs the patient has not improved.  Even on her best days she still short of breath with minimal activity.  Yesterday at her doctor's office she got extremely short of breath and near syncopal in the office.  She is also complaining of atypical chest pain.  It is atypical in the sense that sharp lancinating last just a split second but it does radiate.  Prior to April she had no history of exertional tightness or dyspnea.    Cardiac risk factors: 1 postmenopausal #2 dyslipidemia #3 age greater than 55    Past medical and surgical history, social and family history reviewed in EMR.    REVIEW OF SYSTEMS:     Past Medical History:   Diagnosis Date   • Colon polyp    • GERD (gastroesophageal reflux disease)      Past Surgical History:   Procedure Laterality Date   • COLONOSCOPY     • UPPER GASTROINTESTINAL ENDOSCOPY       Social History     Socioeconomic History   • Marital status:      Spouse name: Not on file   • Number of children: Not on file   • Years of education: Not on file   • Highest education level: Not on file   Tobacco Use   • Smoking status: Never Smoker   • Smokeless tobacco: Never Used   Substance and Sexual Activity   • Alcohol use: Yes     Comment: 3-4 drinks a week   • Drug use: No   • Sexual activity: Defer     Family History   Problem Relation Age of Onset    • Colon polyps Mother    • Colon polyps Sister        H&P ROS reviewed and pertinent CV ROS as noted in HPI.    Cardiac: Patient complains of atypical chest pain.  Atypical in the sense that sharp lasts a second but does recur.  Patient had an episode of near syncope yesterday.  Respiratory: Significant dyspnea on exertion with hoarseness and wheezing since April.  Not responsive to PPI or steroids  Lower Extremities: No lesions  GI: Patient has had an EGD which shows some mild reflux.  No history of bleeding  Neuro: No history of stroke TIA or neurologic event no radicular pain  Hematology: No bleeding ecchymosis petechiae or hematologic malignancy  Endocrine-no diabetes or thyroid disease      Physical Exam: General well-developed female in bed at a 75 degree angle with resting tachypnea O2 sat 95%       HEENT: No JVP, no bruits, tongue midline, no wheezing noted in the neck patient is hoarse       Respiratory: Equal bilateral symmetrical expansion clear to auscultation       Cardiovascular: Regular rate and rhythm without murmur gallop or click and no edema       GI: Positive bowel sounds, nontender to palpation       Lower Extremities: No lesions       Neuro: Facial expressions are symmetrical, moves all 4 extremities, handgrip strength equal bilaterally 5/5       Skin: Warm and dry no edema to palpation       Psych: Pleasant affect oriented x3    Results Review: First EKG shows sinus rhythm second EKG shows sinus rhythm right bundle branch block which is probably rate dependent.  Troponins are negative.  BNP is normal.  Sed rate is normal.           Vital Sign Min/Max for last 24 hours  Temp  Min: 97.4 °F (36.3 °C)  Max: 99.1 °F (37.3 °C)   BP  Min: 106/85  Max: 136/88   Pulse  Min: 77  Max: 110   Resp  Min: 18  Max: 22   SpO2  Min: 88 %  Max: 99 %   No data recorded      Intake/Output Summary (Last 24 hours) at 6/5/2020 1017  Last data filed at 6/5/2020 0811  Gross per 24 hour   Intake --   Output 750 ml    Net -750 ml             Current Facility-Administered Medications:   •  amitriptyline (ELAVIL) tablet 50 mg, 50 mg, Oral, Nightly PRN, Elisa Marin MD  •  [COMPLETED] aspirin chewable tablet 324 mg, 324 mg, Oral, Once, 324 mg at 06/05/20 0544 **AND** [START ON 6/6/2020] aspirin EC tablet 81 mg, 81 mg, Oral, Daily, Day, Elisa STOVALL MD  •  [COMPLETED] clopidogrel (PLAVIX) tablet 600 mg, 600 mg, Oral, Once, 600 mg at 06/05/20 0544 **AND** [START ON 6/6/2020] clopidogrel (PLAVIX) tablet 75 mg, 75 mg, Oral, Daily, Day, Elisa STOVALL MD  •  [START ON 6/8/2020] estradiol (MINIVELLE, VIVELLE-DOT) 0.05 MG/24HR patch 1 patch, 1 patch, Transdermal, Once per day on Mon Thu, Elisa Marin MD  •  heparin 48297 units/250 mL (100 units/mL) in 0.45 % NaCl infusion, 12 Units/kg/hr, Intravenous, Titrated, Elisa Marin MD, Last Rate: 8.82 mL/hr at 06/05/20 0329, 12 Units/kg/hr at 06/05/20 0329  •  levETIRAcetam (KEPPRA) tablet 500 mg, 500 mg, Oral, Q12H, Elisa Marin MD, 500 mg at 06/05/20 0948  •  metoprolol tartrate (LOPRESSOR) tablet 25 mg, 25 mg, Oral, Q12H, Elisa Marin MD, 25 mg at 06/05/20 0948  •  morphine injection 1 mg, 1 mg, Intravenous, Q4H PRN **AND** naloxone (NARCAN) injection 0.4 mg, 0.4 mg, Intravenous, Q5 Min PRN, Elisa Marin MD  •  nitroglycerin (TRIDIL) 200 mcg/ml infusion, 10-50 mcg/min, Intravenous, Titrated, Elisa Marin MD, Stopped at 06/05/20 0428  •  Pharmacy to Dose Heparin, , Does not apply, Continuous PRN, Marcial Reed, [facility]  •  pravastatin (PRAVACHOL) tablet 40 mg, 40 mg, Oral, Nightly, Day, Elisa STOVALL MD  •  progesterone (PROMETRIUM) capsule 200 mg, 200 mg, Oral, Nightly, Day, Elisa STOVALL MD  •  sodium chloride 0.9 % flush 10 mL, 10 mL, Intravenous, PRN, Finesse Moreno DO, 10 mL at 06/04/20 2009  •  [COMPLETED] Insert peripheral IV, , , Once **AND** sodium chloride 0.9 % flush 10 mL, 10 mL, Intravenous, PRN, Mari Watson PA-C, 10 mL at 06/04/20 2008  •  sodium chloride 0.9 % flush 10 mL, 10  mL, Intravenous, Q12H, Elisa Marin MD  •  sodium chloride 0.9 % flush 10 mL, 10 mL, Intravenous, PRN, Elisa Marin MD  •  sodium chloride 0.9 % infusion, 75 mL/hr, Intravenous, Continuous, Elisa Marin MD, Last Rate: 75 mL/hr at 06/05/20 0545, 75 mL/hr at 06/05/20 0545    Lab Review:   Results from last 7 days   Lab Units 06/05/20  0837 06/05/20  0054 06/04/20  1812   WBC 10*3/mm3 10.72 13.84* 14.41*   HEMOGLOBIN g/dL 14.8 15.9 15.3   PLATELETS 10*3/mm3 345 341 352     Results from last 7 days   Lab Units 06/05/20  0837 06/04/20  1812   SODIUM mmol/L 137 139   POTASSIUM mmol/L 4.1 4.5   CO2 mmol/L 19.0* 23.0   BUN mg/dL 12 12   CREATININE mg/dL 0.70 1.00   MAGNESIUM mg/dL 2.6  --    GLUCOSE mg/dL 135* 110*     Estimated Creatinine Clearance: 85.7 mL/min (by C-G formula based on SCr of 0.7 mg/dL).    Results from last 7 days   Lab Units 06/05/20  0837   HEMOGLOBIN A1C % 5.40     .lipd  Lab Results   Component Value Date    TRIG 74 06/05/2020    HDL 91 (H) 06/05/2020    AST 24 06/04/2020    ALT 25 06/04/2020       Radiology Reports:  Imaging Results (Last 72 Hours)     Procedure Component Value Units Date/Time    CT Angiogram Chest [[id number]] Collected:  06/04/20 2252     Updated:  06/04/20 2254    Narrative:       CTA Chest    INDICATION:   Shortness of air since April. Left-sided chest pain.    TECHNIQUE:   CT angiogram of the chest with IV contrast. 3-D reconstructions were obtained and reviewed.   Radiation dose reduction techniques included automated exposure control or exposure modulation based on body size. Count of known CT and cardiac nuc med studies  performed in previous 12 months: 0.     COMPARISON:   None available.    FINDINGS:   No pulmonary embolism or aortic dissection is identified. There is no pleural or pericardial effusion. No adenopathy is seen. There is a small hiatal hernia. Images of the upper abdomen show a moderate stool burden in the colon. Bilateral breast implants  are  present.    There is dependent atelectasis in both lower lobes. There is also some mild atelectasis in the lingula. Lungs are otherwise clear. No CT findings present to indicate pneumonia. Note: CT may be negative in the earliest stages of COVID-19.      Impression:         1. No PE or aortic dissection.  2. Other than some mild atelectasis in the lower lobes and lingula, no active disease in the chest.  3. Small hiatal hernia.    Signer Name: Saji Guevara MD   Signed: 6/4/2020 10:52 PM   Workstation Name: [email or web address]    [facility]    XR Chest 1 View [[id number]] Collected:  06/04/20 1839     Updated:  06/04/20 1913    Narrative:          EXAMINATION: XR CHEST 1 VW-06/04/2020:      INDICATION: SOA, triage protocol.      COMPARISON: NONE.     FINDINGS: Cardiac size within normal limits. Pulmonary vascularity  within normal limits. No focal opacification or consolidation. No  pneumothorax or pleural effusion. Degenerative changes of the spine.           Impression:       No acute cardiopulmonary process.     D:  06/04/2020  E:  06/04/2020     This report was finalized on 6/4/2020 7:10 PM by Dr. Shawn Botello.             Assessment/Plan: 1 atypical chest pain-this pain is sharp lancinating and last is a split second.  It is noncardiac in etiology i.e. not ischemic.  There is no evidence of pericardial inflammation on the EKG and I do not hear a rub.  What I am more concerned about is her significant resting dyspnea.  Prior to April the patient had no history of exertional chest pain and no dyspnea.  No cardiac condition should cause her to be that short of breath at rest without overt heart failure.  I will DC heparin since her enzymes are negative and the pain is noncardiac.  She can be placed on Lovenox.  This will limit a frequent blood draws for PTTs.  We will ask pulmonary to see her right away for evaluation  2 shortness of air-there is no obvious murmur.  We will get an echocardiogram  to look for left pulmonary hypertension or ASD or PFO.  3 rate dependent bundle branch block-check echo to rule out pulmonary hypertension      Saji Manriquez MD  06/05/20  10:17

## 2020-06-06 ENCOUNTER — APPOINTMENT (OUTPATIENT)
Dept: PULMONOLOGY | Facility: HOSPITAL | Age: 60
End: 2020-06-06

## 2020-06-06 VITALS
OXYGEN SATURATION: 97 % | SYSTOLIC BLOOD PRESSURE: 109 MMHG | WEIGHT: 176 LBS | HEART RATE: 71 BPM | BODY MASS INDEX: 32.39 KG/M2 | RESPIRATION RATE: 18 BRPM | HEIGHT: 62 IN | TEMPERATURE: 97.6 F | DIASTOLIC BLOOD PRESSURE: 66 MMHG

## 2020-06-06 PROBLEM — I20.0 UNSTABLE ANGINA (HCC): Status: RESOLVED | Noted: 2020-06-05 | Resolved: 2020-06-05

## 2020-06-06 PROBLEM — K21.9 GERD (GASTROESOPHAGEAL REFLUX DISEASE): Chronic | Status: ACTIVE | Noted: 2020-06-06

## 2020-06-06 PROBLEM — K44.9 HIATAL HERNIA: Chronic | Status: ACTIVE | Noted: 2020-06-06

## 2020-06-06 PROCEDURE — 94727 GAS DIL/WSHOT DETER LNG VOL: CPT | Performed by: INTERNAL MEDICINE

## 2020-06-06 PROCEDURE — 94799 UNLISTED PULMONARY SVC/PX: CPT

## 2020-06-06 PROCEDURE — 94727 GAS DIL/WSHOT DETER LNG VOL: CPT

## 2020-06-06 PROCEDURE — 94729 DIFFUSING CAPACITY: CPT

## 2020-06-06 PROCEDURE — 94729 DIFFUSING CAPACITY: CPT | Performed by: INTERNAL MEDICINE

## 2020-06-06 PROCEDURE — 94060 EVALUATION OF WHEEZING: CPT

## 2020-06-06 PROCEDURE — 99239 HOSP IP/OBS DSCHRG MGMT >30: CPT | Performed by: FAMILY MEDICINE

## 2020-06-06 PROCEDURE — 94060 EVALUATION OF WHEEZING: CPT | Performed by: INTERNAL MEDICINE

## 2020-06-06 RX ORDER — PANTOPRAZOLE SODIUM 40 MG/1
40 TABLET, DELAYED RELEASE ORAL DAILY
Qty: 30 TABLET | Refills: 0 | Status: SHIPPED | OUTPATIENT
Start: 2020-06-06 | End: 2020-08-10

## 2020-06-06 RX ORDER — ALBUTEROL SULFATE 2.5 MG/3ML
2.5 SOLUTION RESPIRATORY (INHALATION) ONCE
Status: COMPLETED | OUTPATIENT
Start: 2020-06-06 | End: 2020-06-06

## 2020-06-06 RX ADMIN — ASPIRIN 81 MG: 81 TABLET, COATED ORAL at 08:53

## 2020-06-06 RX ADMIN — LEVETIRACETAM 500 MG: 500 TABLET ORAL at 08:53

## 2020-06-06 RX ADMIN — CLOPIDOGREL BISULFATE 75 MG: 75 TABLET ORAL at 08:53

## 2020-06-06 RX ADMIN — METOPROLOL TARTRATE 25 MG: 25 TABLET, FILM COATED ORAL at 08:53

## 2020-06-06 RX ADMIN — SODIUM CHLORIDE, PRESERVATIVE FREE 10 ML: 5 INJECTION INTRAVENOUS at 08:53

## 2020-06-06 RX ADMIN — ALBUTEROL SULFATE 2.5 MG: 2.5 SOLUTION RESPIRATORY (INHALATION) at 09:18

## 2020-06-06 NOTE — PLAN OF CARE
Problem: Patient Care Overview  Goal: Plan of Care Review  Outcome: Ongoing (interventions implemented as appropriate)  Flowsheets  Taken 6/6/2020 0403  Progress: improving  Outcome Summary: Pt admits to feeling better than at admission. She is resting well at this time with no complaints. VSS. Will continue to monitor. 0400  6/6/2020  Taken 6/5/2020 2000  Plan of Care Reviewed With: patient

## 2020-06-09 LAB
BACTERIA SPEC AEROBE CULT: NORMAL
BACTERIA SPEC AEROBE CULT: NORMAL

## 2020-06-23 ENCOUNTER — TELEMEDICINE (OUTPATIENT)
Dept: GASTROENTEROLOGY | Facility: CLINIC | Age: 60
End: 2020-06-23

## 2020-06-23 DIAGNOSIS — K44.9 HIATAL HERNIA: Chronic | ICD-10-CM

## 2020-06-23 DIAGNOSIS — K21.9 GASTROESOPHAGEAL REFLUX DISEASE WITHOUT ESOPHAGITIS: Primary | Chronic | ICD-10-CM

## 2020-06-23 PROCEDURE — 99214 OFFICE O/P EST MOD 30 MIN: CPT | Performed by: INTERNAL MEDICINE

## 2020-06-23 NOTE — PROGRESS NOTES
PCP:  Provider, No Known     No referring provider defined for this encounter.    Chief Complaint   Patient presents with   • Heartburn        HPI   The patient is a 59-year-old known to me.  She has a history of reflux.  She did have a upper endoscopy on 7/30/2019.  This was due to refractory reflux.  She was placed on Dexilant.  EGD showed no Castro's esophagus.  There was a small hiatal hernia.  There appeared to be some esophageal spasm.  Biopsies were taken for celiac disease and were negative.  She has had increasing troubles with hoarseness.  This is been since April.  She also was recently hospitalized due to difficulty breathing.  She was evaluated by pulmonary.  They did pulmonary function test but she has not heard the results.  There is some concern from her pulmonary physician that this could be related to reflux.  She was on Dexilant.  She was on Dexilant even twice a day for a while.  It sounds like her reflux was reasonably well controlled but she continued with hoarseness.  She was discharged on Protonix 40 mg a day but does not feel like that is helping.  She does have problems with allergies but does not notice a lot of postnasal drip.    No Known Allergies       Current Outpatient Medications:   •  AJOVY 225 MG/1.5ML solution prefilled syringe, INJECT 1.5 ML AS DIRECTED SUBCUTANEOUSLY ONCE A MONTH, Disp: , Rfl: 3  •  amitriptyline (ELAVIL) 50 MG tablet, Take 50 mg by mouth every night at bedtime., Disp: , Rfl: 3  •  celecoxib (CELEBREX) 200 MG capsule, Every 12 (Twelve) Hours., Disp: , Rfl:   •  estradiol (MINIVELLE, VIVELLE-DOT) 0.05 MG/24HR patch, APPLY 1 PATCH TWICE WEEKLY TO DRY SKIN (EXAMPLE: MON/THURS), Disp: , Rfl: 12  •  gabapentin (NEURONTIN) 100 MG capsule, TAKE ONE CAPSULE BY MOUTH NIGHTLY AT BEDTIME FOR 1 WEEK, THEN INCREASE TO 2 CAPSULES AT BEDTIME FOR 1 WEEK, THEN INCREASE TO 3 CAPSULES NIG, Disp: , Rfl: 2  •  levETIRAcetam (KEPPRA) 500 MG tablet, 1,000 mg Every 12 (Twelve)  Hours., Disp: , Rfl:   •  LINZESS 290 MCG capsule capsule, TAKE ONE CAPSULE BY MOUTH EVERY DAY ON AN EMPTY STOMACH, Disp: , Rfl: 1  •  pantoprazole (Protonix) 40 MG EC tablet, Take 1 tablet by mouth Daily., Disp: 30 tablet, Rfl: 0  •  pravastatin (PRAVACHOL) 40 MG tablet, Take 40 mg by mouth every night at bedtime., Disp: , Rfl: 11  •  progesterone (PROMETRIUM) 200 MG capsule, Take  by mouth., Disp: , Rfl:   •  promethazine (PHENERGAN) 25 MG tablet, TAKE ONE TABLET BY MOUTH THREE TIMES A DAY AS NEEDED FOR HEADACHE NAUSEA AND VOMITING, Disp: , Rfl: 0  •  tiZANidine (ZANAFLEX) 4 MG tablet, TAKE 1/2 TO 1 TABLET BY MOUTH TWO TIMES A DAY AS NEEDED FOR HEADACHE, Disp: , Rfl: 3     Past Medical History:   Diagnosis Date   • Colon polyp    • GERD (gastroesophageal reflux disease)        Past Surgical History:   Procedure Laterality Date   • COLONOSCOPY     • UPPER GASTROINTESTINAL ENDOSCOPY          Social History     Socioeconomic History   • Marital status:      Spouse name: Not on file   • Number of children: Not on file   • Years of education: Not on file   • Highest education level: Not on file   Tobacco Use   • Smoking status: Never Smoker   • Smokeless tobacco: Never Used   Substance and Sexual Activity   • Alcohol use: Yes     Comment: 3-4 drinks a week   • Drug use: No   • Sexual activity: Defer        Family History   Problem Relation Age of Onset   • Colon polyps Mother    • Colon polyps Sister         Review of Systems   HENT: Positive for voice change. Negative for trouble swallowing.    Respiratory: Positive for shortness of breath.    Gastrointestinal: Positive for GERD. Negative for vomiting.        There were no vitals filed for this visit.     Physical Exam   General Appearance: Alert, in no acute distress   Head: Normocephalic, without obvious abnormality, atraumatic   Eyes: Lids and lashes normal, conjunctivae and sclerae normal, no icterus, no pallor, corneas clear, PERRLA   Ears: Ears appear  intact with no abnormalities noted   Extremities: Moves all extremities well   Skin: No bleeding, bruising or rash   Neurologic: Cranial nerves 2 - 12 grossly intact, no focal deficits     Review of systems was reviewed and positives are noted. All of the remaining review of systems in that system are negative.    Delphine was seen today for heartburn.    Diagnoses and all orders for this visit:    Gastroesophageal reflux disease without esophagitis    Hiatal hernia    Impressions and plan #1 gastroesophageal reflux disease: I do think she has an element of gastroesophageal reflux disease.  It is unclear to me whether this is causing her hoarseness.  Patients with gastroparesis can have fairly severe reflux disease.  She does not have the typical symptoms that we would see with that.  She does not feel like food is not emptying.  She does not belch up food from hours before.  We talked about the option of performing a 24-hour pH monitoring study while on Dexilant.  This would give me an idea if she had significant acid reflux while on the medication.  If this is negative, one would have to wonder if there was another etiology.  If she had significant reflux despite the Dexilant, we would either have to try different approach or consider another option.  We briefly discussed surgical options but I hope that will not be necessary.  We will call in some Dexilant for her and set up a 24-hour pH monitoring study.    Clinton Bernard MD

## 2020-08-04 ENCOUNTER — OFFICE VISIT (OUTPATIENT)
Dept: CARDIOLOGY | Facility: CLINIC | Age: 60
End: 2020-08-04

## 2020-08-04 VITALS
TEMPERATURE: 98.4 F | WEIGHT: 185.8 LBS | HEIGHT: 62 IN | OXYGEN SATURATION: 95 % | BODY MASS INDEX: 34.19 KG/M2 | HEART RATE: 98 BPM | DIASTOLIC BLOOD PRESSURE: 81 MMHG | SYSTOLIC BLOOD PRESSURE: 121 MMHG

## 2020-08-04 DIAGNOSIS — R07.89 CHEST PAIN, ATYPICAL: Primary | ICD-10-CM

## 2020-08-04 PROCEDURE — 99204 OFFICE O/P NEW MOD 45 MIN: CPT | Performed by: INTERNAL MEDICINE

## 2020-08-04 NOTE — PROGRESS NOTES
[facility] at [facility]  Follow Up Visit  Delphine Das  1960    VISIT DATE:  08/04/20    PCP:   Timur Virgen MD  [address] [place] [id number]          CC: Follow-up on chest pain    Problem List:  1.  GERD  2.  Hiatal hernia-small  3.  Laryngitis  4.  RBBB  5.  Migraine  6.  PFO  7.  HLD    Echo: June 2020  · Estimated EF appears to be in the range of 61 - 65%.  · Left ventricular systolic function is normal.  · Left ventricular diastolic dysfunction (grade I) consistent with impaired relaxation.  · Saline test results are positive for right to left atrial level shunt.  · No significant structural or functional valvular disease.         History of Present Illness:  Delphine Das  Is a 59 y.o. female with pertinent cardiac history detailed above.  Patient here for evaluation of dyspnea on exertion and chest pain.  She was admitted for this in June.  At that time ruled out for acute MI.  Did have intermittent right bundle branch block on her EKG.  She has been dealing with a chronic laryngitis for the last few months and this may be contributing to her shortness of breath as well.  Prior to her hospital admission she stated she did receive some migraine injections and it was felt presyncopal this was an additional reason for coming in to get checked out.  During her hospital stay she ruled out for ACS.  CT of the chest showed a hiatal hernia but no PE or dissection.  She was seen by cardiology but no ischemic study done at that time recommended outpatient follow-up.  She describes her chest pain as sharp occurring on a almost daily basis with some radiation to the left shoulder.  Also has dyspnea on exertion that she noted while walking her dog.  She does have a history of hyperlipidemia.  She is a non-smoker.  No premature family history of CAD      Patient Active Problem List    Diagnosis Date Noted   • Hiatal hernia 06/06/2020   • GERD (gastroesophageal reflux disease)  06/06/2020   • Acute coronary syndrome (CMS/HCC) 06/05/2020   • Leukocytosis 06/05/2020   • Lactic acidosis 06/05/2020   • Dyspnea 06/05/2020   • Laryngitis 06/05/2020   • PFO (patent foramen ovale) : Suspected with atrial level shunt on bubble study echo 6/5/2020. 06/05/2020     Note Last Updated: 6/5/2020     Suspected with atrial level shunt on bubble study echo 6/5/2020.     • Migraines 06/05/2020   • Vocal cord dysfunction - suspected.  06/05/2020     Note Last Updated: 6/5/2020     - suspected.          No Known Allergies    Social History     Socioeconomic History   • Marital status:      Spouse name: Not on file   • Number of children: Not on file   • Years of education: Not on file   • Highest education level: Not on file   Tobacco Use   • Smoking status: Never Smoker   • Smokeless tobacco: Never Used   Substance and Sexual Activity   • Alcohol use: Yes     Comment: 3-4 drinks a week   • Drug use: No   • Sexual activity: Defer       Family History   Problem Relation Age of Onset   • Colon polyps Mother    • Colon polyps Sister        Current Medications:    Current Outpatient Medications:   •  AJOVY 225 MG/1.5ML solution prefilled syringe, INJECT 1.5 ML AS DIRECTED SUBCUTANEOUSLY ONCE A MONTH, Disp: , Rfl: 3  •  amitriptyline (ELAVIL) 50 MG tablet, Take 50 mg by mouth every night at bedtime., Disp: , Rfl: 3  •  estradiol (MINIVELLE, VIVELLE-DOT) 0.05 MG/24HR patch, APPLY 1 PATCH TWICE WEEKLY TO DRY SKIN (EXAMPLE: MON/THURS), Disp: , Rfl: 12  •  levETIRAcetam (KEPPRA) 500 MG tablet, 1,000 mg Every 12 (Twelve) Hours., Disp: , Rfl:   •  pantoprazole (Protonix) 40 MG EC tablet, Take 1 tablet by mouth Daily., Disp: 30 tablet, Rfl: 0  •  pravastatin (PRAVACHOL) 40 MG tablet, Take 40 mg by mouth every night at bedtime., Disp: , Rfl: 11  •  progesterone (PROMETRIUM) 200 MG capsule, Take  by mouth., Disp: , Rfl:   •  promethazine (PHENERGAN) 25 MG tablet, TAKE ONE TABLET BY MOUTH THREE TIMES A DAY AS NEEDED  "FOR HEADACHE NAUSEA AND VOMITING, Disp: , Rfl: 0  •  tiZANidine (ZANAFLEX) 4 MG tablet, TAKE 1/2 TO 1 TABLET BY MOUTH TWO TIMES A DAY AS NEEDED FOR HEADACHE, Disp: , Rfl: 3     Review of Systems   HENT: Positive for hoarse voice.    Cardiovascular: Positive for chest pain, dyspnea on exertion and near-syncope. Negative for irregular heartbeat, palpitations and syncope.   Respiratory: Positive for shortness of breath.    Gastrointestinal: Positive for abdominal pain.   All other systems reviewed and are negative.      Vitals:    08/04/20 1545   BP: 121/81   BP Location: Right arm   Patient Position: Sitting   Pulse: 98   Temp: 98.4 °F (36.9 °C)   SpO2: 95%   Weight: 84.3 kg (185 lb 12.8 oz)   Height: 157.5 cm (62\")       Physical Exam   Constitutional: She is oriented to person, place, and time. She appears well-developed and well-nourished.   HENT:   Head: Normocephalic and atraumatic.   Eyes: EOM are normal.   Neck: Neck supple.   Cardiovascular: Normal rate, regular rhythm, normal heart sounds and intact distal pulses.   Pulmonary/Chest: Effort normal. Stridor present. She has no rales.   Abdominal: Soft.   Musculoskeletal: She exhibits no edema.   Neurological: She is alert and oriented to person, place, and time.   Skin: Skin is warm and dry.   Psychiatric: She has a normal mood and affect.       Diagnostic Data:  Procedures  Lab Results   Component Value Date    TRIG 74 06/05/2020    HDL 91 (H) 06/05/2020     Lab Results   Component Value Date    GLUCOSE 135 (H) 06/05/2020    BUN 12 06/05/2020    CREATININE 0.70 06/05/2020     06/05/2020    K 4.1 06/05/2020     06/05/2020    CO2 19.0 (L) 06/05/2020     Lab Results   Component Value Date    HGBA1C 5.40 06/05/2020     Lab Results   Component Value Date    WBC 10.72 06/05/2020    HGB 14.8 06/05/2020    HCT 44.3 06/05/2020     06/05/2020       Assessment:   Diagnosis Plan   1. Chest pain, atypical  Stress Test With Myocardial Perfusion "       Plan:    -Main risk factor for CAD is hyperlipidemia  -Has some atypical type chest pain but also dyspnea on exertion  -Dyspnea on exertion may in part be exacerbated by her of the laryngitis and stridor  -She does see both GI and ENT  -We will get further evaluation with stress myocardial perfusion study and follow-up in 6 weeks        Rylan Marin MD

## 2020-08-10 ENCOUNTER — OFFICE VISIT (OUTPATIENT)
Dept: GASTROENTEROLOGY | Facility: CLINIC | Age: 60
End: 2020-08-10

## 2020-08-10 VITALS
HEART RATE: 92 BPM | WEIGHT: 188 LBS | DIASTOLIC BLOOD PRESSURE: 77 MMHG | SYSTOLIC BLOOD PRESSURE: 139 MMHG | BODY MASS INDEX: 34.39 KG/M2 | TEMPERATURE: 97.7 F

## 2020-08-10 DIAGNOSIS — K44.9 HIATAL HERNIA: Primary | Chronic | ICD-10-CM

## 2020-08-10 DIAGNOSIS — K21.9 GASTROESOPHAGEAL REFLUX DISEASE, ESOPHAGITIS PRESENCE NOT SPECIFIED: Chronic | ICD-10-CM

## 2020-08-10 DIAGNOSIS — R49.0 HOARSENESS OF VOICE: ICD-10-CM

## 2020-08-10 PROCEDURE — 99213 OFFICE O/P EST LOW 20 MIN: CPT | Performed by: INTERNAL MEDICINE

## 2020-08-10 RX ORDER — DEXLANSOPRAZOLE 60 MG/1
CAPSULE, DELAYED RELEASE ORAL
Qty: 30 CAPSULE | Refills: 11 | Status: SHIPPED | OUTPATIENT
Start: 2020-08-10 | End: 2021-08-27

## 2020-08-10 NOTE — PROGRESS NOTES
PCP:  Timur Virgen MD     No referring provider defined for this encounter.    Chief Complaint   Patient presents with   • Follow-up        HPI   The patient is a 59-year-old female with a history of reflux symptoms.  Her reflux symptoms appear to be somewhat controlled unless she bends over.  She does have a hoarseness of her voice.  She had an upper endoscopy on 7/30/2019.  She was placed on Dexilant.  No Castro's esophagus was noted although there was a small hiatal hernia.  Biopsies for celiac disease were negative.  Since April she has had troubles with hoarseness and has been evaluated by pulmonary physicians who felt this could be reflux.  Her cardiologist thought that her problems may be more pulmonary in origin.    No Known Allergies       Current Outpatient Medications:   •  AJOVY 225 MG/1.5ML solution prefilled syringe, INJECT 1.5 ML AS DIRECTED SUBCUTANEOUSLY ONCE A MONTH, Disp: , Rfl: 3  •  amitriptyline (ELAVIL) 50 MG tablet, Take 50 mg by mouth every night at bedtime., Disp: , Rfl: 3  •  dexlansoprazole (DEXILANT) 60 MG capsule, Take 1 capsule by mouth daily, Disp: 30 capsule, Rfl: 11  •  estradiol (MINIVELLE, VIVELLE-DOT) 0.05 MG/24HR patch, APPLY 1 PATCH TWICE WEEKLY TO DRY SKIN (EXAMPLE: MON/THURS), Disp: , Rfl: 12  •  levETIRAcetam (KEPPRA) 500 MG tablet, 1,000 mg Every 12 (Twelve) Hours., Disp: , Rfl:   •  pravastatin (PRAVACHOL) 40 MG tablet, Take 40 mg by mouth every night at bedtime., Disp: , Rfl: 11  •  progesterone (PROMETRIUM) 200 MG capsule, Take  by mouth., Disp: , Rfl:   •  promethazine (PHENERGAN) 25 MG tablet, TAKE ONE TABLET BY MOUTH THREE TIMES A DAY AS NEEDED FOR HEADACHE NAUSEA AND VOMITING, Disp: , Rfl: 0  •  tiZANidine (ZANAFLEX) 4 MG tablet, TAKE 1/2 TO 1 TABLET BY MOUTH TWO TIMES A DAY AS NEEDED FOR HEADACHE, Disp: , Rfl: 3     Past Medical History:   Diagnosis Date   • Colon polyp    • GERD (gastroesophageal reflux disease)        Past Surgical History:   Procedure  Laterality Date   • COLONOSCOPY     • UPPER GASTROINTESTINAL ENDOSCOPY          Social History     Socioeconomic History   • Marital status:      Spouse name: Not on file   • Number of children: Not on file   • Years of education: Not on file   • Highest education level: Not on file   Tobacco Use   • Smoking status: Never Smoker   • Smokeless tobacco: Never Used   Substance and Sexual Activity   • Alcohol use: Yes     Comment: 3-4 drinks a week   • Drug use: No   • Sexual activity: Defer        Family History   Problem Relation Age of Onset   • Colon polyps Mother    • Colon polyps Sister         Review of Systems   Constitutional: Negative.    HENT: Positive for voice change. Negative for trouble swallowing.    Gastrointestinal: Positive for GERD.        Vitals:    08/10/20 1340   BP: 139/77   Pulse: 92   Temp: 97.7 °F (36.5 °C)        Physical Exam   General Appearance: Alert, in no acute distress   Head: Normocephalic, without obvious abnormality, atraumatic   Eyes: Lids and lashes normal, conjunctivae and sclerae normal, no icterus, no pallor, corneas clear, PERRLA   Ears: Ears appear intact with no abnormalities noted   Extremities: Moves all extremities well, no edema, no cyanosis, no redness   Skin: No bleeding, bruising or rash   Neurologic: Cranial nerves 2 - 12 grossly intact, no focal deficits     Review of systems was reviewed and positives are noted. All of the remaining review of systems in that system are negative.    Delphine was seen today for follow-up.    Diagnoses and all orders for this visit:    Hiatal hernia    Gastroesophageal reflux disease, esophagitis presence not specified    Hoarseness of voice    Other orders  -     dexlansoprazole (DEXILANT) 60 MG capsule; Take 1 capsule by mouth daily    Impressions and plan #1 gastroesophageal reflux disease: Suspect she has an element of reflux disease but I am not sure that it is causing her all of her hoarseness.  There are other potential  etiologies for that.  I think at this point it would be wisest to check a 24-hour pH monitor study while on Dexilant.  If she has significant acid reflux it should be detected on both pH sensors.  Typically there is a sensor in the lower esophagus, and one higher in the mid to upper esophagus.  If we do this on Dexilant, we should see if her acid is controlled while on the medication.  She is in agreement.  If it is not controlled we may need to consider either increasing her therapy or changing the therapy.    Clinton Bernard MD

## 2020-08-17 ENCOUNTER — PREP FOR SURGERY (OUTPATIENT)
Dept: OTHER | Facility: HOSPITAL | Age: 60
End: 2020-08-17

## 2020-08-17 DIAGNOSIS — K21.9 GASTROESOPHAGEAL REFLUX DISEASE, ESOPHAGITIS PRESENCE NOT SPECIFIED: Primary | Chronic | ICD-10-CM

## 2020-08-17 RX ORDER — LIDOCAINE HYDROCHLORIDE 20 MG/ML
1 SOLUTION OROPHARYNGEAL ONCE AS NEEDED
Status: CANCELLED | OUTPATIENT
Start: 2020-08-17

## 2020-08-30 ENCOUNTER — APPOINTMENT (OUTPATIENT)
Dept: PREADMISSION TESTING | Facility: HOSPITAL | Age: 60
End: 2020-08-30

## 2020-08-30 PROCEDURE — C9803 HOPD COVID-19 SPEC COLLECT: HCPCS

## 2020-08-30 PROCEDURE — U0002 COVID-19 LAB TEST NON-CDC: HCPCS

## 2020-08-30 PROCEDURE — U0004 COV-19 TEST NON-CDC HGH THRU: HCPCS

## 2020-08-31 LAB
REF LAB TEST METHOD: NORMAL
SARS-COV-2 RNA RESP QL NAA+PROBE: NOT DETECTED

## 2020-09-01 ENCOUNTER — HOSPITAL ENCOUNTER (OUTPATIENT)
Facility: HOSPITAL | Age: 60
Setting detail: HOSPITAL OUTPATIENT SURGERY
Discharge: HOME | End: 2020-09-01
Attending: SURGERY | Admitting: SURGERY

## 2020-09-01 PROCEDURE — 91034 GASTROESOPHAGEAL REFLUX TEST: CPT | Performed by: SURGERY

## 2020-09-01 RX ORDER — LIDOCAINE HYDROCHLORIDE 20 MG/ML
SOLUTION OROPHARYNGEAL AS NEEDED
Status: DISCONTINUED | OUTPATIENT
Start: 2020-09-01 | End: 2020-09-01 | Stop reason: HOSPADM

## 2020-09-09 ENCOUNTER — APPOINTMENT (OUTPATIENT)
Dept: PREADMISSION TESTING | Facility: HOSPITAL | Age: 60
End: 2020-09-09

## 2020-09-09 ENCOUNTER — HOSPITAL ENCOUNTER (OUTPATIENT)
Dept: CARDIOLOGY | Facility: HOSPITAL | Age: 60
Discharge: HOME | End: 2020-09-09

## 2020-09-09 VITALS
HEIGHT: 62 IN | WEIGHT: 188 LBS | DIASTOLIC BLOOD PRESSURE: 78 MMHG | HEART RATE: 84 BPM | SYSTOLIC BLOOD PRESSURE: 106 MMHG | BODY MASS INDEX: 34.6 KG/M2

## 2020-09-09 DIAGNOSIS — R07.89 CHEST PAIN, ATYPICAL: ICD-10-CM

## 2020-09-09 LAB
BH CV STRESS BP STAGE 1: NORMAL
BH CV STRESS BP STAGE 2: NORMAL
BH CV STRESS DURATION MIN STAGE 1: 3
BH CV STRESS DURATION MIN STAGE 2: 3
BH CV STRESS DURATION MIN STAGE 3: 2
BH CV STRESS DURATION SEC STAGE 1: 0
BH CV STRESS DURATION SEC STAGE 2: 0
BH CV STRESS DURATION SEC STAGE 3: 18
BH CV STRESS GRADE STAGE 1: 10
BH CV STRESS GRADE STAGE 2: 12
BH CV STRESS GRADE STAGE 3: 14
BH CV STRESS HR STAGE 1: 112
BH CV STRESS HR STAGE 2: 130
BH CV STRESS HR STAGE 3: 150
BH CV STRESS METS STAGE 1: 5
BH CV STRESS METS STAGE 2: 7.5
BH CV STRESS METS STAGE 3: 10
BH CV STRESS O2 STAGE 1: 100
BH CV STRESS O2 STAGE 2: 98
BH CV STRESS O2 STAGE 3: 99
BH CV STRESS PROTOCOL 1: NORMAL
BH CV STRESS RECOVERY BP: NORMAL MMHG
BH CV STRESS RECOVERY HR: 99 BPM
BH CV STRESS SPEED STAGE 1: 1.7
BH CV STRESS SPEED STAGE 2: 2.5
BH CV STRESS SPEED STAGE 3: 3.4
BH CV STRESS STAGE 1: 1
BH CV STRESS STAGE 2: 2
BH CV STRESS STAGE 3: 3
LV EF NUC BP: 90 %
MAXIMAL PREDICTED HEART RATE: 161 BPM
PERCENT MAX PREDICTED HR: 93.17 %
SARS-COV-2 RNA RESP QL NAA+PROBE: NOT DETECTED
STRESS BASELINE BP: NORMAL MMHG
STRESS BASELINE HR: 75 BPM
STRESS O2 SAT REST: 100 %
STRESS PERCENT HR: 110 %
STRESS POST ESTIMATED WORKLOAD: 10.1 METS
STRESS POST EXERCISE DUR MIN: 8 MIN
STRESS POST EXERCISE DUR SEC: 18 SEC
STRESS POST O2 SAT PEAK: 99 %
STRESS POST PEAK BP: NORMAL MMHG
STRESS POST PEAK HR: 150 BPM
STRESS TARGET HR: 137 BPM

## 2020-09-09 PROCEDURE — 87635 SARS-COV-2 COVID-19 AMP PRB: CPT | Performed by: INTERNAL MEDICINE

## 2020-09-09 PROCEDURE — 0 TECHNETIUM SESTAMIBI: Performed by: INTERNAL MEDICINE

## 2020-09-09 PROCEDURE — C9803 HOPD COVID-19 SPEC COLLECT: HCPCS

## 2020-09-09 PROCEDURE — A9500 TC99M SESTAMIBI: HCPCS | Performed by: INTERNAL MEDICINE

## 2020-09-09 PROCEDURE — 78452 HT MUSCLE IMAGE SPECT MULT: CPT | Performed by: INTERNAL MEDICINE

## 2020-09-09 PROCEDURE — 93018 CV STRESS TEST I&R ONLY: CPT | Performed by: INTERNAL MEDICINE

## 2020-09-09 PROCEDURE — 78452 HT MUSCLE IMAGE SPECT MULT: CPT

## 2020-09-09 PROCEDURE — 93017 CV STRESS TEST TRACING ONLY: CPT

## 2020-09-09 RX ADMIN — TECHNETIUM TC 99M SESTAMIBI 1 DOSE: 1 INJECTION INTRAVENOUS at 12:45

## 2020-09-09 RX ADMIN — TECHNETIUM TC 99M SESTAMIBI 1 DOSE: 1 INJECTION INTRAVENOUS at 11:00

## 2020-09-23 PROBLEM — I24.9 ACUTE CORONARY SYNDROME (HCC): Status: RESOLVED | Noted: 2020-06-05 | Resolved: 2020-09-23

## 2020-09-23 NOTE — PROGRESS NOTES
[facility]  Follow Up Visit  Delphine Das  1960    VISIT DATE:  09/24/20    PCP:   Timur Virgen MD  [address] [place] [id number]          CC:  Chest Pain (f/u)      Problem List:  1.  GERD  2.  Hiatal hernia-small  3.  Laryngitis  4.  RBBB  5.  Migraine  6.  PFO  7.  HLD     Echo: June 2020  · Estimated EF appears to be in the range of 61 - 65%.  · Left ventricular systolic function is normal.  · Left ventricular diastolic dysfunction (grade I) consistent with impaired relaxation.  · Saline test results are positive for right to left atrial level shunt.  · No significant structural or functional valvular disease.    Stress test September 2020  · Patient did endorse 4/10 chest pain during exercise, resolved 3 minutes into recovery  · Baseline EKG sinus with right bundle branch block. No EKG evidence of ischemia  · Myocardial perfusion imaging indicates a normal myocardial perfusion study with no evidence of ischemia.  · No TID  · Left ventricular ejection fraction is hyperdynamic (Calculated EF > 70%).  · No significant coronary artery calcification on the CT portion of the exam  · Impressions are consistent with a low risk study.         History of Present Illness:  Delphine Das  Is a 60 y.o. female with pertinent cardiac history detailed above.  Patient following up after her stress test.  This showed no evidence of ischemia.  She was complaining of some chest pain with radiation to the left shoulder and dyspnea on exertion.  Still having still daily episodes of pain. Last up to 30 minutes at a time,   She describes it as alternating between sharp and dull.  No exacerbation with exertion, but does have shortness of breat.  Tries to walk 30  Mins/day.  She had a EGD and pH study with GI and goes to follow-up with their office later today to follow-up on the results.      Patient Active Problem List    Diagnosis Date Noted   • Gastroesophageal reflux  disease 08/17/2020     Note Last Updated: 8/17/2020     Added automatically from request for surgery [id number]     • Hoarseness of voice 08/10/2020   • Hiatal hernia 06/06/2020   • GERD (gastroesophageal reflux disease) 06/06/2020   • Leukocytosis 06/05/2020   • Lactic acidosis 06/05/2020   • Dyspnea 06/05/2020   • Laryngitis 06/05/2020   • PFO (patent foramen ovale) : Suspected with atrial level shunt on bubble study echo 6/5/2020. 06/05/2020     Note Last Updated: 6/5/2020     Suspected with atrial level shunt on bubble study echo 6/5/2020.     • Migraines 06/05/2020   • Vocal cord dysfunction - suspected.  06/05/2020     Note Last Updated: 6/5/2020     - suspected.          No Known Allergies    Social History     Socioeconomic History   • Marital status:      Spouse name: Not on file   • Number of children: Not on file   • Years of education: Not on file   • Highest education level: Not on file   Tobacco Use   • Smoking status: Never Smoker   • Smokeless tobacco: Never Used   Substance and Sexual Activity   • Alcohol use: Yes     Comment: 3-4 drinks a week   • Drug use: No   • Sexual activity: Defer       Family History   Problem Relation Age of Onset   • Colon polyps Mother    • Colon polyps Sister        Current Medications:    Current Outpatient Medications:   •  AJOVY 225 MG/1.5ML solution prefilled syringe, INJECT 1.5 ML AS DIRECTED SUBCUTANEOUSLY ONCE A MONTH, Disp: , Rfl: 3  •  amitriptyline (ELAVIL) 50 MG tablet, Take 50 mg by mouth every night at bedtime., Disp: , Rfl: 3  •  dexlansoprazole (DEXILANT) 60 MG capsule, Take 1 capsule by mouth daily, Disp: 30 capsule, Rfl: 11  •  DULoxetine (CYMBALTA) 20 MG capsule, Take 20 mg by mouth Daily., Disp: , Rfl:   •  estradiol (MINIVELLE, VIVELLE-DOT) 0.05 MG/24HR patch, APPLY 1 PATCH TWICE WEEKLY TO DRY SKIN (EXAMPLE: MON/THURS), Disp: , Rfl: 12  •  levETIRAcetam (KEPPRA) 500 MG tablet, 1,000 mg Every 12 (Twelve) Hours., Disp: , Rfl:   •  pravastatin  "(PRAVACHOL) 40 MG tablet, Take 40 mg by mouth every night at bedtime., Disp: , Rfl: 11  •  progesterone (PROMETRIUM) 200 MG capsule, Take  by mouth., Disp: , Rfl:   •  promethazine (PHENERGAN) 25 MG tablet, TAKE ONE TABLET BY MOUTH THREE TIMES A DAY AS NEEDED FOR HEADACHE NAUSEA AND VOMITING, Disp: , Rfl: 0  •  tiZANidine (ZANAFLEX) 4 MG tablet, TAKE 1/2 TO 1 TABLET BY MOUTH TWO TIMES A DAY AS NEEDED FOR HEADACHE, Disp: , Rfl: 3     Review of Systems   HENT: Positive for hoarse voice.    Cardiovascular: Positive for chest pain and dyspnea on exertion. Negative for irregular heartbeat, leg swelling, near-syncope, orthopnea, palpitations and syncope.   Respiratory: Negative for cough and shortness of breath.    All other systems reviewed and are negative.      Vitals:    09/24/20 1423   BP: 119/78   BP Location: Right arm   Patient Position: Sitting   Pulse: 98   SpO2: 100%   Weight: 85.1 kg (187 lb 9.6 oz)   Height: 157.5 cm (62\")       Physical Exam  Constitutional:       Appearance: Normal appearance.   Cardiovascular:      Rate and Rhythm: Normal rate and regular rhythm.      Pulses: Normal pulses.      Heart sounds: Normal heart sounds.   Pulmonary:      Effort: Pulmonary effort is normal.      Breath sounds: Normal breath sounds.   Abdominal:      General: Abdomen is flat.      Palpations: Abdomen is soft.      Tenderness: There is no abdominal tenderness.   Musculoskeletal:      Right lower leg: No edema.      Left lower leg: No edema.   Neurological:      Mental Status: She is alert.         Diagnostic Data:  Procedures  Lab Results   Component Value Date    TRIG 74 06/05/2020    HDL 91 (H) 06/05/2020     Lab Results   Component Value Date    GLUCOSE 135 (H) 06/05/2020    BUN 12 06/05/2020    CREATININE 0.70 06/05/2020     06/05/2020    K 4.1 06/05/2020     06/05/2020    CO2 19.0 (L) 06/05/2020     Lab Results   Component Value Date    HGBA1C 5.40 06/05/2020     Lab Results   Component Value Date "    WBC 10.72 06/05/2020    HGB 14.8 06/05/2020    HCT 44.3 06/05/2020     06/05/2020       Assessment:   Diagnosis Plan   1. Other chest pain         Plan:      1.  Chest pain, dyspnea on exertion  -Stress test shows no evidence of ischemia, she did have chest pain while exercising on the treadmill  -Review his echo with normal EF, grade 1 diastolic dysfunction, no significant valve disease    2..  Laryngitis and stridor  -Follows with GI for GERD  -Normal EGD and pH study, there was evidence of reflux    -Will have patient f/u with GI and if felt that is not the culprit can consider Cardiac CTA vs Cath  If symptoms persisiting    Follow-up 3 months      Rylan Marin MD

## 2020-09-24 ENCOUNTER — OFFICE VISIT (OUTPATIENT)
Dept: GASTROENTEROLOGY | Facility: CLINIC | Age: 60
End: 2020-09-24

## 2020-09-24 ENCOUNTER — OFFICE VISIT (OUTPATIENT)
Dept: CARDIOLOGY | Facility: CLINIC | Age: 60
End: 2020-09-24

## 2020-09-24 VITALS
BODY MASS INDEX: 33.84 KG/M2 | HEART RATE: 87 BPM | TEMPERATURE: 97.7 F | SYSTOLIC BLOOD PRESSURE: 141 MMHG | WEIGHT: 185 LBS | DIASTOLIC BLOOD PRESSURE: 80 MMHG

## 2020-09-24 VITALS
WEIGHT: 187.6 LBS | SYSTOLIC BLOOD PRESSURE: 119 MMHG | HEART RATE: 98 BPM | DIASTOLIC BLOOD PRESSURE: 78 MMHG | OXYGEN SATURATION: 100 % | HEIGHT: 62 IN | BODY MASS INDEX: 34.52 KG/M2

## 2020-09-24 DIAGNOSIS — R49.0 HOARSENESS OF VOICE: ICD-10-CM

## 2020-09-24 DIAGNOSIS — R07.89 OTHER CHEST PAIN: Primary | ICD-10-CM

## 2020-09-24 DIAGNOSIS — K21.9 GASTROESOPHAGEAL REFLUX DISEASE, ESOPHAGITIS PRESENCE NOT SPECIFIED: Primary | Chronic | ICD-10-CM

## 2020-09-24 DIAGNOSIS — D12.2 ADENOMATOUS POLYP OF ASCENDING COLON: ICD-10-CM

## 2020-09-24 PROCEDURE — 99214 OFFICE O/P EST MOD 30 MIN: CPT | Performed by: INTERNAL MEDICINE

## 2020-09-24 PROCEDURE — 99213 OFFICE O/P EST LOW 20 MIN: CPT | Performed by: INTERNAL MEDICINE

## 2020-09-24 NOTE — PROGRESS NOTES
PCP:  Timur Virgen MD     No referring provider defined for this encounter.    Chief Complaint   Patient presents with   • Follow-up     follow up PH study        HPI   The patient is a 60-year-old known to me with a history of hoarseness.  She also has a history of clinical reflux.  She had an upper endoscopy on 7/30/2019.  This showed some spasm in the esophagus hiatal hernia and some gastritis.  Biopsies were negative for celiac disease.  She was placed on Dexilant due to some substernal burning and reflux symptoms.  This appears to have controlled her reflux symptoms as best as she can tell.  She was seen by ENT who thought her problem may be pulmonary in origin.  She was seen by pulmonary who thought this may be reflux in origin.  She recently had a 24-hour pH monitor study while on Dexilant.  She had normal distal is acid esophageal exposure with 0% of the time the pH being less than 4.  Her DeMeester score was 0.9.    No Known Allergies       Current Outpatient Medications:   •  AJOVY 225 MG/1.5ML solution prefilled syringe, INJECT 1.5 ML AS DIRECTED SUBCUTANEOUSLY ONCE A MONTH, Disp: , Rfl: 3  •  amitriptyline (ELAVIL) 50 MG tablet, Take 50 mg by mouth every night at bedtime., Disp: , Rfl: 3  •  dexlansoprazole (DEXILANT) 60 MG capsule, Take 1 capsule by mouth daily, Disp: 30 capsule, Rfl: 11  •  DULoxetine (CYMBALTA) 20 MG capsule, Take 20 mg by mouth Daily., Disp: , Rfl:   •  estradiol (MINIVELLE, VIVELLE-DOT) 0.05 MG/24HR patch, APPLY 1 PATCH TWICE WEEKLY TO DRY SKIN (EXAMPLE: MON/THURS), Disp: , Rfl: 12  •  levETIRAcetam (KEPPRA) 500 MG tablet, 1,000 mg Every 12 (Twelve) Hours., Disp: , Rfl:   •  pravastatin (PRAVACHOL) 40 MG tablet, Take 40 mg by mouth every night at bedtime., Disp: , Rfl: 11  •  progesterone (PROMETRIUM) 200 MG capsule, Take  by mouth., Disp: , Rfl:   •  promethazine (PHENERGAN) 25 MG tablet, TAKE ONE TABLET BY MOUTH THREE TIMES A DAY AS NEEDED FOR HEADACHE NAUSEA AND VOMITING,  Disp: , Rfl: 0  •  tiZANidine (ZANAFLEX) 4 MG tablet, TAKE 1/2 TO 1 TABLET BY MOUTH TWO TIMES A DAY AS NEEDED FOR HEADACHE, Disp: , Rfl: 3     Past Medical History:   Diagnosis Date   • Colon polyp    • GERD (gastroesophageal reflux disease)        Past Surgical History:   Procedure Laterality Date   • COLONOSCOPY     • GASTRIC PH MONITORING 24HR N/A 9/1/2020    Procedure: GASTRIC PH MONITORING 24HR;  Surgeon: Cliff Sheffield MD;  Location: [facility] ENDOSCOPY;  Service: General;  Laterality: N/A;  Probe successfully placed 25 at left nare   • UPPER GASTROINTESTINAL ENDOSCOPY          Social History     Socioeconomic History   • Marital status:      Spouse name: Not on file   • Number of children: Not on file   • Years of education: Not on file   • Highest education level: Not on file   Tobacco Use   • Smoking status: Never Smoker   • Smokeless tobacco: Never Used   Substance and Sexual Activity   • Alcohol use: Yes     Comment: 3-4 drinks a week   • Drug use: No   • Sexual activity: Defer        Family History   Problem Relation Age of Onset   • Colon polyps Mother    • Colon polyps Sister         Review of Systems   Constitutional: Negative.    HENT: Positive for voice change. Negative for trouble swallowing.    Gastrointestinal: Negative.         Vitals:    09/24/20 1534   BP: 141/80   Pulse: 87   Temp: 97.7 °F (36.5 °C)        Physical Exam   General Appearance: Alert, in no acute distress   Head: Normocephalic, without obvious abnormality, atraumatic   Eyes: Lids and lashes normal, conjunctivae and sclerae normal, no icterus, no pallor, corneas clear, PERRLA   Ears: Ears appear intact with no abnormalities noted   Extremities: Moves all extremities well, no edema, no cyanosis, no redness   Skin: No bleeding, bruising or rash   Neurologic: Cranial nerves 2 - 12 grossly intact, no focal deficits     Review of systems was reviewed and positives are noted. All of the remaining review of systems in that  system are negative.    Delphine was seen today for follow-up.    Diagnoses and all orders for this visit:    Gastroesophageal reflux disease, esophagitis presence not specified    Hoarseness of voice    Adenomatous polyp of ascending colon    Impressions and plan #1 gastroesophageal reflux disease: Her reflux disease appears to be well controlled both clinically and via 24-hour pH monitor study on the Dexilant.  I think this makes acid reflux as a cause of her hoarseness much less likely.  I have no doubt she has an element of reflux as she does have symptoms of burning if she is not on with her proton pump inhibitor, but it is hard to imagine that it is causing the hoarseness at this point.  Does not feel anything like alkaline reflux or have any emesis in her mouth.  She is going to be seeing her ENT and pulmonologist in the near future.    #2 history of adenomatous polyps: Her last evaluation she had several polyps removed from the colon.  There was some stool in areas.  We talked about the merits of repeating a colonoscopy at this point as it is been over 3 years and there were some areas of stool remaining.  She would like to get that done.    Clinton Bernard MD

## 2020-09-30 RX ORDER — SODIUM, POTASSIUM,MAG SULFATES 17.5-3.13G
2 SOLUTION, RECONSTITUTED, ORAL ORAL TAKE AS DIRECTED
Qty: 354 ML | Refills: 0 | Status: SHIPPED | OUTPATIENT
Start: 2020-09-30 | End: 2020-10-07

## 2020-10-04 ENCOUNTER — APPOINTMENT (OUTPATIENT)
Dept: PREADMISSION TESTING | Facility: HOSPITAL | Age: 60
End: 2020-10-04

## 2020-10-04 LAB — SARS-COV-2 RNA NOSE QL NAA+PROBE: NOT DETECTED

## 2020-10-04 PROCEDURE — C9803 HOPD COVID-19 SPEC COLLECT: HCPCS

## 2020-10-04 PROCEDURE — U0004 COV-19 TEST NON-CDC HGH THRU: HCPCS

## 2020-10-07 ENCOUNTER — OFFICE VISIT (OUTPATIENT)
Dept: ORTHOPEDIC SURGERY | Facility: CLINIC | Age: 60
End: 2020-10-07

## 2020-10-07 VITALS — HEART RATE: 80 BPM | HEIGHT: 62 IN | WEIGHT: 184.97 LBS | OXYGEN SATURATION: 97 % | BODY MASS INDEX: 34.04 KG/M2

## 2020-10-07 DIAGNOSIS — S82.892A CLOSED AVULSION FRACTURE OF LEFT ANKLE, INITIAL ENCOUNTER: Primary | ICD-10-CM

## 2020-10-07 PROCEDURE — 99203 OFFICE O/P NEW LOW 30 MIN: CPT | Performed by: ORTHOPAEDIC SURGERY

## 2020-10-07 RX ORDER — TIOTROPIUM BROMIDE AND OLODATEROL 3.124; 2.736 UG/1; UG/1
2 SPRAY, METERED RESPIRATORY (INHALATION) AS NEEDED
COMMUNITY
Start: 2020-09-02

## 2020-10-07 RX ORDER — FLUTICASONE PROPIONATE 50 MCG
SPRAY, SUSPENSION (ML) NASAL AS NEEDED
COMMUNITY
Start: 2016-04-20

## 2020-10-07 NOTE — PROGRESS NOTES
[facility] Note    Subjective     Chief Complaint   Patient presents with   • Left Ankle - Pain     Stepped on stick 10/06/2020        HPI  Delphine Das is a 60 y.o. female who presents with new problem of: left ankle pain.  Onset: mechanical fall. The issue has been ongoing for 1 day(s). Pain is a 4/10 on the pain scale. Pain is described as aching. Associated symptoms include pain and swelling. The pain is worse with walking and standing; resting improve the pain. Previous treatments have included: bracing.    I have reviewed the following portions of the patient's history:History of Present Illnessand review of systems.    She is on disability for migraines.  She was powerwalking when this happened.  She already has a boot.        Past Medical History:   Diagnosis Date   • Colon polyp    • GERD (gastroesophageal reflux disease)       Past Surgical History:   Procedure Laterality Date   • COLONOSCOPY     • GASTRIC PH MONITORING 24HR N/A 9/1/2020    Procedure: GASTRIC PH MONITORING 24HR;  Surgeon: Cliff Sheffield MD;  Location: [facility] ENDOSCOPY;  Service: General;  Laterality: N/A;  Probe successfully placed 25 at left nare   • UPPER GASTROINTESTINAL ENDOSCOPY        Family History   Problem Relation Age of Onset   • Colon polyps Mother    • Colon polyps Sister      Social History     Socioeconomic History   • Marital status:      Spouse name: Not on file   • Number of children: Not on file   • Years of education: Not on file   • Highest education level: Not on file   Tobacco Use   • Smoking status: Never Smoker   • Smokeless tobacco: Never Used   Substance and Sexual Activity   • Alcohol use: Yes     Comment: 3-4 drinks a week   • Drug use: No   • Sexual activity: Defer      Current Outpatient Medications on File Prior to Visit   Medication Sig Dispense Refill   • AJOVY 225 MG/1.5ML solution prefilled syringe INJECT 1.5 ML AS DIRECTED SUBCUTANEOUSLY ONCE A MONTH  3   •  amitriptyline (ELAVIL) 50 MG tablet Take 50 mg by mouth every night at bedtime.  3   • dexlansoprazole (DEXILANT) 60 MG capsule Take 1 capsule by mouth daily 30 capsule 11   • DULoxetine (CYMBALTA) 20 MG capsule Take 20 mg by mouth Daily.     • estradiol (MINIVELLE, VIVELLE-DOT) 0.05 MG/24HR patch APPLY 1 PATCH TWICE WEEKLY TO DRY SKIN (EXAMPLE: MON/THURS)  12   • fluticasone (FLONASE) 50 MCG/ACT nasal spray into the nostril(s) as directed by provider.     • levETIRAcetam (KEPPRA) 500 MG tablet 1,000 mg Every 12 (Twelve) Hours.     • pravastatin (PRAVACHOL) 40 MG tablet Take 40 mg by mouth every night at bedtime.  11   • progesterone (PROMETRIUM) 200 MG capsule Take  by mouth.     • promethazine (PHENERGAN) 25 MG tablet TAKE ONE TABLET BY MOUTH THREE TIMES A DAY AS NEEDED FOR HEADACHE NAUSEA AND VOMITING  0   • Stiolto Respimat 2.5-2.5 MCG/ACT aerosol solution inhaler      • tiZANidine (ZANAFLEX) 4 MG tablet TAKE 1/2 TO 1 TABLET BY MOUTH TWO TIMES A DAY AS NEEDED FOR HEADACHE  3   • [DISCONTINUED] sodium-potassium-magnesium sulfates (Suprep Bowel Prep Kit) 17.5-3.13-1.6 GM/177ML solution oral solution Take 2 bottles by mouth Take As Directed. Do Not Eat The Day Before Your Procedure. Call [phone] for questions. 354 mL 0     No current facility-administered medications on file prior to visit.       No Known Allergies     The following portions of the patient's history were reviewed and updated as appropriate: allergies, current medications, past family history, past medical history, past social history, past surgical history and problem list.    Review of Systems   Constitutional: Negative.    HENT: Negative.    Eyes: Negative.    Respiratory: Negative.    Cardiovascular: Negative.    Gastrointestinal: Negative.    Endocrine: Negative.    Genitourinary: Negative.    Musculoskeletal: Positive for arthralgias.   Skin: Negative.    Allergic/Immunologic: Negative.    Neurological: Negative.    Hematological:  "Negative.    Psychiatric/Behavioral: Negative.         Objective      Physical Exam  Pulse 80   Ht 157.5 cm (62.01\")   Wt 83.9 kg (184 lb 15.5 oz)   SpO2 97%   BMI 33.82 kg/m²     Body mass index is 33.82 kg/m².    GENERAL APPEARANCE: awake, alert & oriented x 3, in no acute distress and well developed, well nourished  PSYCH: normal mood and affect  LUNGS:  breathing nonlabored, no wheezing  EYES: sclera anicteric, pupils equal  CARDIOVASCULAR: palpable pulses. Capillary refill less than 2 seconds  INTEGUMENTARY: skin intact, no clubbing, cyanosis  NEUROLOGIC:  Normal Sensation and reflexes       Ortho Exam  Peripheral Vascular:  Lower Extremity:  Inspection:  Left--rapid capillary refill  Palpation:  Dorsalis pedis pulse:  Left--normal    Neurologic  Sensory:    Light Touch:     Left foot:  Dorsal intact and plantar intact    Overall Assessment of Muscle Strength and Tone:  Lower Extremities:       Left:  Tibialis anterior--5/5    Gastroc soleus--5/5    EHL--5/5    FHL--5/5    Musculoskeletal  Lower Extremity  Ankle/Foot:  Inspection and Palpation:        Left:  Tenderness: Distal fibula    Swelling: Over the ATFL    Effusion:  None    Crepitus:  None     ROM:     Left: Plantarflexion--50    Dorsiflexion--20    Inversion--10    Eversion--10    Instability:          Left: Anterior drawer test--negative    Squeeze test--negative    Imaging/Studies  Imaging Results (Last 7 Days)     ** No results found for the last 168 hours. **      I viewed her x-rays from October 6 which shows a avulsion fracture of the distal fibula    Assessment/Plan        ICD-10-CM ICD-9-CM   1. Closed avulsion fracture of left ankle, initial encounter  S82.892A 824.8     Plan to be treated with a boot for 4 to 6 weeks.  She is weight-bear as tolerated.  Follow-up in 3 weeks x-rays left ankle.  She is on disability for migraines.  She will rest ice and elevate it.  Medical Decision Making  Management Options : over-the-counter medicine and " close treatment of fracture or dislocation  Data/Risk: radiology tests and independent visualization of imaging, lab tests, or EMG/NCV    Jordan Echols MD  10/07/20  13:42 EDT         EMR Dragon/Transcription disclaimer:  Much of this encounter note is an electronic transcription of spoken language to printed text. Electronic transcription of spoken language may permit erroneous, or at times, nonsensical words or phrases to be inadvertently transcribed. Although I have reviewed the note for such errors, some may still exist.

## 2020-10-28 ENCOUNTER — OFFICE VISIT (OUTPATIENT)
Dept: ORTHOPEDIC SURGERY | Facility: CLINIC | Age: 60
End: 2020-10-28

## 2020-10-28 VITALS — BODY MASS INDEX: 34.04 KG/M2 | HEART RATE: 113 BPM | WEIGHT: 184.97 LBS | OXYGEN SATURATION: 96 % | HEIGHT: 62 IN

## 2020-10-28 DIAGNOSIS — S82.892D CLOSED AVULSION FRACTURE OF LEFT ANKLE WITH ROUTINE HEALING, SUBSEQUENT ENCOUNTER: ICD-10-CM

## 2020-10-28 DIAGNOSIS — Z09 FRACTURE FOLLOW-UP: Primary | ICD-10-CM

## 2020-10-28 PROCEDURE — 99212 OFFICE O/P EST SF 10 MIN: CPT | Performed by: ORTHOPAEDIC SURGERY

## 2020-10-28 NOTE — PROGRESS NOTES
[facility] [place] [place] [facility] [place]    Subjective     Chief Complaint   Patient presents with   • Follow-up     3 week recheck - Closed avulsion fracture of left ankle   -  Stepped on stick 10/06/2020        ZULEIMA Das is a 60 y.o. female.  She is doing well.  Still has some pain and tenderness.  She has been wearing the boot.    Past Medical History:   Diagnosis Date   • Colon polyp    • GERD (gastroesophageal reflux disease)       Past Surgical History:   Procedure Laterality Date   • COLONOSCOPY     • GASTRIC PH MONITORING 24HR N/A 9/1/2020    Procedure: GASTRIC PH MONITORING 24HR;  Surgeon: Cliff Sheffield MD;  Location: [facility] ENDOSCOPY;  Service: General;  Laterality: N/A;  Probe successfully placed 25 at left nare   • UPPER GASTROINTESTINAL ENDOSCOPY        Family History   Problem Relation Age of Onset   • Colon polyps Mother    • Colon polyps Sister      Social History     Socioeconomic History   • Marital status:      Spouse name: Not on file   • Number of children: Not on file   • Years of education: Not on file   • Highest education level: Not on file   Tobacco Use   • Smoking status: Never Smoker   • Smokeless tobacco: Never Used   Substance and Sexual Activity   • Alcohol use: Yes     Comment: 3-4 drinks a week   • Drug use: No   • Sexual activity: Defer      Current Outpatient Medications on File Prior to Visit   Medication Sig Dispense Refill   • AJOVY 225 MG/1.5ML solution prefilled syringe INJECT 1.5 ML AS DIRECTED SUBCUTANEOUSLY ONCE A MONTH  3   • amitriptyline (ELAVIL) 50 MG tablet Take 50 mg by mouth every night at bedtime.  3   • dexlansoprazole (DEXILANT) 60 MG capsule Take 1 capsule by mouth daily 30 capsule 11   • DULoxetine (CYMBALTA) 20 MG capsule Take 20 mg by mouth Daily.     • estradiol (MINIVELLE, VIVELLE-DOT) 0.05 MG/24HR patch APPLY 1 PATCH TWICE WEEKLY TO DRY SKIN (EXAMPLE: MON/THURS)  12   • fluticasone (FLONASE) 50 MCG/ACT nasal spray into the  "nostril(s) as directed by provider.     • levETIRAcetam (KEPPRA) 500 MG tablet 1,000 mg Every 12 (Twelve) Hours.     • pravastatin (PRAVACHOL) 40 MG tablet Take 40 mg by mouth every night at bedtime.  11   • progesterone (PROMETRIUM) 200 MG capsule Take  by mouth.     • promethazine (PHENERGAN) 25 MG tablet TAKE ONE TABLET BY MOUTH THREE TIMES A DAY AS NEEDED FOR HEADACHE NAUSEA AND VOMITING  0   • Stiolto Respimat 2.5-2.5 MCG/ACT aerosol solution inhaler      • tiZANidine (ZANAFLEX) 4 MG tablet TAKE 1/2 TO 1 TABLET BY MOUTH TWO TIMES A DAY AS NEEDED FOR HEADACHE  3     No current facility-administered medications on file prior to visit.       No Known Allergies     The following portions of the patient's history were reviewed and updated as appropriate: allergies, current medications, past family history, past medical history, past social history, past surgical history and problem list.    Review of Systems   Constitutional: Negative.    HENT: Negative.    Eyes: Negative.    Respiratory: Negative.    Cardiovascular: Negative.    Gastrointestinal: Negative.    Endocrine: Negative.    Genitourinary: Negative.    Musculoskeletal: Positive for arthralgias.   Skin: Negative.    Allergic/Immunologic: Negative.    Neurological: Negative.    Hematological: Negative.    Psychiatric/Behavioral: Negative.         Objective      Physical Exam  Pulse 113   Ht 157.5 cm (62.01\")   Wt 83.9 kg (184 lb 15.5 oz)   SpO2 96%   BMI 33.82 kg/m²     Body mass index is 33.82 kg/m².    GENERAL APPEARANCE: awake, alert & oriented x 3, in no acute distress and well developed, well nourished  PSYCH: normal mood and affect  LUNGS:  breathing nonlabored, no wheezing  Minimal swelling and tenderness left ankle distal fibula  Imaging/Studies  Imaging Results (Last 7 Days)     Procedure Component Value Units Date/Time    XR Ankle 3+ View Left [[id number]] Resulted: 10/28/20 1317     Updated: 10/28/20 1318    Narrative:      Left Ankle " X-Ray  Indication: Pain  Views: AP, Lateral, Mortise    Findings:   Healing left distal fibula fracture  No bony lesion  Soft tissues normal  Normal joint spaces with mortise well-aligned, no evidence of syndesmosis   widening    prior studies available for comparison.            Assessment/Plan        ICD-10-CM ICD-9-CM   1. Fracture follow-up  Z09 V67.4   2. Closed avulsion fracture of left ankle with routine healing, subsequent encounter  S82.892D V54.19       Orders Placed This Encounter   Procedures   • XR Ankle 3+ View Left      I gave her some home exercises to do daily.  Boot for 2 more weeks and ankle brace after that.  Follow-up in a month with x-rays  Medical Decision Making  Management Options : over-the-counter medicine and close treatment of fracture or dislocation  Data/Risk: radiology tests and independent visualization of imaging, lab tests, or EMG/NCV    Jordan Echols MD  10/28/20  13:21 EDT         EMR Dragon/Transcription disclaimer:  Much of this encounter note is an electronic transcription of spoken language to printed text. Electronic transcription of spoken language may permit erroneous, or at times, nonsensical words or phrases to be inadvertently transcribed. Although I have reviewed the note for such errors, some may still exist.

## 2020-11-30 ENCOUNTER — OFFICE VISIT (OUTPATIENT)
Dept: ORTHOPEDIC SURGERY | Facility: CLINIC | Age: 60
End: 2020-11-30

## 2020-11-30 VITALS — HEART RATE: 98 BPM | HEIGHT: 62 IN | WEIGHT: 185.6 LBS | BODY MASS INDEX: 34.16 KG/M2 | OXYGEN SATURATION: 97 %

## 2020-11-30 DIAGNOSIS — Z09 FRACTURE FOLLOW-UP: Primary | ICD-10-CM

## 2020-11-30 DIAGNOSIS — S82.892D CLOSED AVULSION FRACTURE OF LEFT ANKLE WITH ROUTINE HEALING, SUBSEQUENT ENCOUNTER: ICD-10-CM

## 2020-11-30 PROCEDURE — 99212 OFFICE O/P EST SF 10 MIN: CPT | Performed by: ORTHOPAEDIC SURGERY

## 2020-11-30 NOTE — PROGRESS NOTES
[facility] [place] [facility] Note    Subjective     Chief Complaint   Patient presents with   • Left Ankle - Follow-up     1 month f/u, Closed avulsion fracture of left ankle DOI 10/06/2020        HPI  Delphine Das is a 60 y.o. female.  She is follow-up avulsion fracture left ankle from October.  Doing well with no complaints.    Past Medical History:   Diagnosis Date   • Colon polyp    • GERD (gastroesophageal reflux disease)       Past Surgical History:   Procedure Laterality Date   • COLONOSCOPY     • GASTRIC PH MONITORING 24HR N/A 9/1/2020    Procedure: GASTRIC PH MONITORING 24HR;  Surgeon: Cliff Sheffield MD;  Location: [facility] ENDOSCOPY;  Service: General;  Laterality: N/A;  Probe successfully placed 25 at left nare   • UPPER GASTROINTESTINAL ENDOSCOPY        Family History   Problem Relation Age of Onset   • Colon polyps Mother    • Colon polyps Sister      Social History     Socioeconomic History   • Marital status:      Spouse name: Not on file   • Number of children: Not on file   • Years of education: Not on file   • Highest education level: Not on file   Tobacco Use   • Smoking status: Never Smoker   • Smokeless tobacco: Never Used   Substance and Sexual Activity   • Alcohol use: Yes     Comment: 3-4 drinks a week   • Drug use: No   • Sexual activity: Defer      Current Outpatient Medications on File Prior to Visit   Medication Sig Dispense Refill   • AJOVY 225 MG/1.5ML solution prefilled syringe INJECT 1.5 ML AS DIRECTED SUBCUTANEOUSLY ONCE A MONTH  3   • amitriptyline (ELAVIL) 50 MG tablet Take 50 mg by mouth every night at bedtime.  3   • dexlansoprazole (DEXILANT) 60 MG capsule Take 1 capsule by mouth daily 30 capsule 11   • DULoxetine (CYMBALTA) 20 MG capsule Take 20 mg by mouth Daily.     • estradiol (MINIVELLE, VIVELLE-DOT) 0.05 MG/24HR patch APPLY 1 PATCH TWICE WEEKLY TO DRY SKIN (EXAMPLE: MON/THURS)  12   • fluticasone (FLONASE) 50 MCG/ACT nasal spray into the nostril(s)  "as directed by provider.     • levETIRAcetam (KEPPRA) 500 MG tablet 1,000 mg Every 12 (Twelve) Hours.     • pravastatin (PRAVACHOL) 40 MG tablet Take 40 mg by mouth every night at bedtime.  11   • progesterone (PROMETRIUM) 200 MG capsule Take  by mouth.     • promethazine (PHENERGAN) 25 MG tablet TAKE ONE TABLET BY MOUTH THREE TIMES A DAY AS NEEDED FOR HEADACHE NAUSEA AND VOMITING  0   • Stiolto Respimat 2.5-2.5 MCG/ACT aerosol solution inhaler      • tiZANidine (ZANAFLEX) 4 MG tablet TAKE 1/2 TO 1 TABLET BY MOUTH TWO TIMES A DAY AS NEEDED FOR HEADACHE  3     No current facility-administered medications on file prior to visit.       No Known Allergies     The following portions of the patient's history were reviewed and updated as appropriate: allergies, current medications, past family history, past medical history, past social history, past surgical history and problem list.    Review of Systems   Constitutional: Negative.    HENT: Negative.    Eyes: Negative.    Respiratory: Negative.    Cardiovascular: Negative.    Gastrointestinal: Negative.    Endocrine: Negative.    Genitourinary: Negative.    Musculoskeletal: Positive for arthralgias.   Skin: Negative.    Allergic/Immunologic: Negative.    Neurological: Negative.    Hematological: Negative.    Psychiatric/Behavioral: Negative.         Objective      Physical Exam  Pulse 98   Ht 157.5 cm (62.01\")   Wt 84.2 kg (185 lb 9.6 oz)   SpO2 97%   BMI 33.94 kg/m²     Body mass index is 33.94 kg/m².    GENERAL APPEARANCE: awake, alert & oriented x 3, in no acute distress and well developed, well nourished  PSYCH: normal mood and affect  LUNGS:  breathing nonlabored, no wheezing  Left ankle with no swelling and no tenderness.  Imaging/Studies  Imaging Results (Last 7 Days)     Procedure Component Value Units Date/Time    XR Ankle 3+ View Left [[id number]] Resulted: 11/30/20 1305     Updated: 11/30/20 1306    Narrative:      Left Ankle X-Ray  Indication: Pain  Views: " AP, Lateral, Mortise    Findings:   Healing of left ankle avulsion fracture  No bony lesion  Soft tissues normal  Normal joint spaces with mortise well-aligned, no evidence of syndesmosis   widening    prior studies available for comparison.            Assessment/Plan        ICD-10-CM ICD-9-CM   1. Fracture follow-up  Z09 V67.4   2. Closed avulsion fracture of left ankle with routine healing, subsequent encounter  S82.892D V54.19       Orders Placed This Encounter   Procedures   • XR Ankle 3+ View Left      She is doing great.  She will follow-up as needed.  Medical Decision Making  Management Options : over-the-counter medicine and close treatment of fracture or dislocation  Data/Risk: radiology tests and independent visualization of imaging, lab tests, or EMG/NCV    Jordan Echols MD  11/30/20  13:09 EST         EMR Dragon/Transcription disclaimer:  Much of this encounter note is an electronic transcription of spoken language to printed text. Electronic transcription of spoken language may permit erroneous, or at times, nonsensical words or phrases to be inadvertently transcribed. Although I have reviewed the note for such errors, some may still exist.

## 2021-01-18 NOTE — H&P (VIEW-ONLY)
[facility]  Follow Up Visit  Delphine Das  1960    VISIT DATE:  01/19/21    PCP:   Timur Virgen MD  [facility] [address]  [facility] [address]          CC:  Other chest pain      Problem List:  1.  GERD  2.  Hiatal hernia-small  3.  Laryngitis  4.  RBBB  5.  Migraine  6.  PFO  7.  HLD  8.  GERD  9.  Family hx CAD Sister and Mother     Echo: June 2020  · Estimated EF appears to be in the range of 61 - 65%.  · Left ventricular systolic function is normal.  · Left ventricular diastolic dysfunction (grade I) consistent with impaired relaxation.  · Saline test results are positive for right to left atrial level shunt.  · No significant structural or functional valvular disease.     Stress test September 2020  · Patient did endorse 4/10 chest pain during exercise, resolved 3 minutes into recovery  · Baseline EKG sinus with right bundle branch block. No EKG evidence of ischemia  · Myocardial perfusion imaging indicates a normal myocardial perfusion study with no evidence of ischemia.  · No TID  · Left ventricular ejection fraction is hyperdynamic (Calculated EF > 70%).  · No significant coronary artery calcification on the CT portion of the exam  · Impressions are consistent with a low risk study.    History of Present Illness:  Delphine Das  Is a 60 y.o. female with pertinent cardiac history detailed above.  Continues to have complaints of chest heaviness with some radiation to the left arm.   Also still with dyspnea on exertion.   Stress test in September did not show ischemia but she did c/o chest pain on the treadmill.  Symptoms have persisted. Does have GERD but appears fairly controlled, had recent pH monitoring study and was recommended to continue her PPI but no other new recommendations.  Also continues to have a feeling of easy fatigue.  Some increase in resting heart rate.  Also her hoarseness/stridor persists.  Discussed with the history of stress test showed no ischemia  blood pressure persistent symptoms we could pursue left heart catheterization for definitive diagnosis/exclusion of CAD.  Discussed the risks and benefits with her today and she is agreeable to proceeding with the procedure.      Patient Active Problem List    Diagnosis Date Noted   • Gastroesophageal reflux disease 08/17/2020     Note Last Updated: 8/17/2020     Added automatically from request for surgery [id number]     • Hoarseness of voice 08/10/2020   • Hiatal hernia 06/06/2020   • GERD (gastroesophageal reflux disease) 06/06/2020   • Leukocytosis 06/05/2020   • Lactic acidosis 06/05/2020   • Dyspnea 06/05/2020   • Laryngitis 06/05/2020   • PFO (patent foramen ovale) : Suspected with atrial level shunt on bubble study echo 6/5/2020. 06/05/2020     Note Last Updated: 6/5/2020     Suspected with atrial level shunt on bubble study echo 6/5/2020.     • Migraines 06/05/2020   • Vocal cord dysfunction - suspected.  06/05/2020     Note Last Updated: 6/5/2020     - suspected.          No Known Allergies    Social History     Socioeconomic History   • Marital status:      Spouse name: Not on file   • Number of children: Not on file   • Years of education: Not on file   • Highest education level: Not on file   Tobacco Use   • Smoking status: Never Smoker   • Smokeless tobacco: Never Used   Substance and Sexual Activity   • Alcohol use: Yes     Comment: 3-4 drinks a week   • Drug use: No   • Sexual activity: Defer       Family History   Problem Relation Age of Onset   • Colon polyps Mother    • Colon polyps Sister    • Colon cancer Brother    • No Known Problems Brother    • No Known Problems Brother    • Stroke Sister    • No Known Problems Sister    • No Known Problems Sister    • No Known Problems Sister    • No Known Problems Sister        Current Medications:    Current Outpatient Medications:   •  AJOVY 225 MG/1.5ML solution prefilled syringe, INJECT 1.5 ML AS DIRECTED SUBCUTANEOUSLY ONCE A MONTH, Disp: , Rfl:  3  •  amitriptyline (ELAVIL) 50 MG tablet, Take 50 mg by mouth every night at bedtime., Disp: , Rfl: 3  •  Dexchlorpheniramine-Phenyleph (STAHIST PO), Take 1 tablet by mouth Daily., Disp: , Rfl:   •  dexlansoprazole (DEXILANT) 60 MG capsule, Take 1 capsule by mouth daily, Disp: 30 capsule, Rfl: 11  •  DULOXETINE HCL PO, Take 90 mg by mouth Daily., Disp: , Rfl:   •  estradiol (MINIVELLE, VIVELLE-DOT) 0.05 MG/24HR patch, APPLY 1 PATCH TWICE WEEKLY TO DRY SKIN (EXAMPLE: MON/THURS), Disp: , Rfl: 12  •  fluticasone (FLONASE) 50 MCG/ACT nasal spray, into the nostril(s) as directed by provider As Needed., Disp: , Rfl:   •  levETIRAcetam (KEPPRA) 500 MG tablet, 1,000 mg Every 12 (Twelve) Hours., Disp: , Rfl:   •  pravastatin (PRAVACHOL) 40 MG tablet, Take 40 mg by mouth every night at bedtime., Disp: , Rfl: 11  •  progesterone (PROMETRIUM) 100 MG capsule, Take  by mouth Daily., Disp: , Rfl:   •  promethazine (PHENERGAN) 25 MG tablet, TAKE ONE TABLET BY MOUTH THREE TIMES A DAY AS NEEDED FOR HEADACHE NAUSEA AND VOMITING, Disp: , Rfl: 0  •  rizatriptan (MAXALT) 10 MG tablet, Take 10 mg by mouth 1 (One) Time As Needed for Migraine. May repeat in 2 hours if needed, Disp: , Rfl:   •  Stiolto Respimat 2.5-2.5 MCG/ACT aerosol solution inhaler, 2 puffs As Needed., Disp: , Rfl:   •  tiZANidine (ZANAFLEX) 4 MG tablet, TAKE 1/2 TO 1 TABLET BY MOUTH TWO TIMES A DAY AS NEEDED FOR HEADACHE, Disp: , Rfl: 3  •  aspirin 81 MG EC tablet, Take 1 tablet by mouth Daily., Disp: 30 tablet, Rfl: 6  •  dilTIAZem XR (DILACOR XR) 120 MG 24 hr capsule, Take 1 capsule by mouth Daily., Disp: 30 capsule, Rfl: 11     Review of Systems   HENT: Positive for hoarse voice.    Cardiovascular: Positive for chest pain.   Respiratory: Positive for cough and shortness of breath.    Gastrointestinal: Positive for heartburn.   All other systems reviewed and are negative.      Vitals:    01/19/21 1541   BP: 118/76   BP Location: Right arm   Patient Position: Sitting  "  Cuff Size: Adult   Pulse: 114   Temp: 97.5 °F (36.4 °C)   SpO2: 97%   Weight: 85.7 kg (189 lb)   Height: 157.5 cm (62\")       Physical Exam  Constitutional:       Appearance: Normal appearance.   HENT:      Mouth/Throat:      Comments: Chronically hoarse  Cardiovascular:      Rate and Rhythm: Regular rhythm. Tachycardia present.      Pulses: Normal pulses.      Heart sounds: Normal heart sounds.   Pulmonary:      Effort: Pulmonary effort is normal.      Breath sounds: Normal breath sounds.   Musculoskeletal:      Right lower leg: No edema.      Left lower leg: No edema.   Skin:     General: Skin is warm and dry.   Neurological:      Mental Status: She is alert.         Diagnostic Data:  Procedures  Lab Results   Component Value Date    TRIG 74 06/05/2020    HDL 91 (H) 06/05/2020     Lab Results   Component Value Date    GLUCOSE 135 (H) 06/05/2020    BUN 12 06/05/2020    CREATININE 0.70 06/05/2020     06/05/2020    K 4.1 06/05/2020     06/05/2020    CO2 19.0 (L) 06/05/2020     Lab Results   Component Value Date    HGBA1C 5.40 06/05/2020     Lab Results   Component Value Date    WBC 10.72 06/05/2020    HGB 14.8 06/05/2020    HCT 44.3 06/05/2020     06/05/2020       Assessment:   Diagnosis Plan   1. Chest pain, unspecified type  Case Request Cath Lab: Coronary angiography       Plan:    1.    Persistent chest pain, dyspnea on exertion  -Stress test did not show perfusion/ekg  evidence of ischemia, she did have chest pain while exercising on the treadmill  -echo with normal EF, grade 1 diastolic dysfunction, no significant valve disease  -Normal RVSP on echo  -No restriction or obstruction  -add ASA 81mg and diltiazem 120 mg daily  -Discussed with persistent chest pain and pressure since stress test in September without other specific pathology is identified could perform cardiac catheterization for definitive diagnosis/exclusion of CAD.  Discussed the risks and benefits of the procedure with her " and she would like to proceed.  -We will schedule the heart catheterization with Dr. Burnett     2..  Laryngitis and stridor  -Follows with GI for GERD  -Normal EGD and pH study, there was evidence of reflux    3.  PFO  Incidental finding, does not require work-up      Follow-up after procedure    Rylan Marin MD [facility]

## 2021-01-18 NOTE — PROGRESS NOTES
Cardiology  Follow Up Visit  Delphine Das  1960    VISIT DATE:  01/19/21    PCP:   Timur Virgen MD  [facility] [address]  [facility] [address]          CC:  Other chest pain      Problem List:  1.  GERD  2.  Hiatal hernia-small  3.  Laryngitis  4.  RBBB  5.  Migraine  6.  PFO  7.  HLD  8.  GERD  9.  Family hx CAD Sister and Mother     Echo: June 2020  · Estimated EF appears to be in the range of 61 - 65%.  · Left ventricular systolic function is normal.  · Left ventricular diastolic dysfunction (grade I) consistent with impaired relaxation.  · Saline test results are positive for right to left atrial level shunt.  · No significant structural or functional valvular disease.     Stress test September 2020  · Patient did endorse 4/10 chest pain during exercise, resolved 3 minutes into recovery  · Baseline EKG sinus with right bundle branch block. No EKG evidence of ischemia  · Myocardial perfusion imaging indicates a normal myocardial perfusion study with no evidence of ischemia.  · No TID  · Left ventricular ejection fraction is hyperdynamic (Calculated EF > 70%).  · No significant coronary artery calcification on the CT portion of the exam  · Impressions are consistent with a low risk study.    History of Present Illness:  Delphine Das  Is a 60 y.o. female with pertinent cardiac history detailed above.  Continues to have complaints of chest heaviness with some radiation to the left arm.   Also still with dyspnea on exertion.   Stress test in September did not show ischemia but she did c/o chest pain on the treadmill.  Symptoms have persisted. Does have GERD but appears fairly controlled, had recent pH monitoring study and was recommended to continue her PPI but no other new recommendations.  Also continues to have a feeling of easy fatigue.  Some increase in resting heart rate.  Also her hoarseness/stridor persists.  Discussed with the history of stress test showed no ischemia  blood pressure persistent symptoms we could pursue left heart catheterization for definitive diagnosis/exclusion of CAD.  Discussed the risks and benefits with her today and she is agreeable to proceeding with the procedure.      Patient Active Problem List    Diagnosis Date Noted   • Gastroesophageal reflux disease 08/17/2020     Note Last Updated: 8/17/2020     Added automatically from request for surgery [id number]     • Hoarseness of voice 08/10/2020   • Hiatal hernia 06/06/2020   • GERD (gastroesophageal reflux disease) 06/06/2020   • Leukocytosis 06/05/2020   • Lactic acidosis 06/05/2020   • Dyspnea 06/05/2020   • Laryngitis 06/05/2020   • PFO (patent foramen ovale) : Suspected with atrial level shunt on bubble study echo 6/5/2020. 06/05/2020     Note Last Updated: 6/5/2020     Suspected with atrial level shunt on bubble study echo 6/5/2020.     • Migraines 06/05/2020   • Vocal cord dysfunction - suspected.  06/05/2020     Note Last Updated: 6/5/2020     - suspected.          No Known Allergies    Social History     Socioeconomic History   • Marital status:      Spouse name: Not on file   • Number of children: Not on file   • Years of education: Not on file   • Highest education level: Not on file   Tobacco Use   • Smoking status: Never Smoker   • Smokeless tobacco: Never Used   Substance and Sexual Activity   • Alcohol use: Yes     Comment: 3-4 drinks a week   • Drug use: No   • Sexual activity: Defer       Family History   Problem Relation Age of Onset   • Colon polyps Mother    • Colon polyps Sister    • Colon cancer Brother    • No Known Problems Brother    • No Known Problems Brother    • Stroke Sister    • No Known Problems Sister    • No Known Problems Sister    • No Known Problems Sister    • No Known Problems Sister        Current Medications:    Current Outpatient Medications:   •  AJOVY 225 MG/1.5ML solution prefilled syringe, INJECT 1.5 ML AS DIRECTED SUBCUTANEOUSLY ONCE A MONTH, Disp: , Rfl:  3  •  amitriptyline (ELAVIL) 50 MG tablet, Take 50 mg by mouth every night at bedtime., Disp: , Rfl: 3  •  Dexchlorpheniramine-Phenyleph (STAHIST PO), Take 1 tablet by mouth Daily., Disp: , Rfl:   •  dexlansoprazole (DEXILANT) 60 MG capsule, Take 1 capsule by mouth daily, Disp: 30 capsule, Rfl: 11  •  DULOXETINE HCL PO, Take 90 mg by mouth Daily., Disp: , Rfl:   •  estradiol (MINIVELLE, VIVELLE-DOT) 0.05 MG/24HR patch, APPLY 1 PATCH TWICE WEEKLY TO DRY SKIN (EXAMPLE: MON/THURS), Disp: , Rfl: 12  •  fluticasone (FLONASE) 50 MCG/ACT nasal spray, into the nostril(s) as directed by provider As Needed., Disp: , Rfl:   •  levETIRAcetam (KEPPRA) 500 MG tablet, 1,000 mg Every 12 (Twelve) Hours., Disp: , Rfl:   •  pravastatin (PRAVACHOL) 40 MG tablet, Take 40 mg by mouth every night at bedtime., Disp: , Rfl: 11  •  progesterone (PROMETRIUM) 100 MG capsule, Take  by mouth Daily., Disp: , Rfl:   •  promethazine (PHENERGAN) 25 MG tablet, TAKE ONE TABLET BY MOUTH THREE TIMES A DAY AS NEEDED FOR HEADACHE NAUSEA AND VOMITING, Disp: , Rfl: 0  •  rizatriptan (MAXALT) 10 MG tablet, Take 10 mg by mouth 1 (One) Time As Needed for Migraine. May repeat in 2 hours if needed, Disp: , Rfl:   •  Stiolto Respimat 2.5-2.5 MCG/ACT aerosol solution inhaler, 2 puffs As Needed., Disp: , Rfl:   •  tiZANidine (ZANAFLEX) 4 MG tablet, TAKE 1/2 TO 1 TABLET BY MOUTH TWO TIMES A DAY AS NEEDED FOR HEADACHE, Disp: , Rfl: 3  •  aspirin 81 MG EC tablet, Take 1 tablet by mouth Daily., Disp: 30 tablet, Rfl: 6  •  dilTIAZem XR (DILACOR XR) 120 MG 24 hr capsule, Take 1 capsule by mouth Daily., Disp: 30 capsule, Rfl: 11     Review of Systems   HENT: Positive for hoarse voice.    Cardiovascular: Positive for chest pain.   Respiratory: Positive for cough and shortness of breath.    Gastrointestinal: Positive for heartburn.   All other systems reviewed and are negative.      Vitals:    01/19/21 1541   BP: 118/76   BP Location: Right arm   Patient Position: Sitting  "  Cuff Size: Adult   Pulse: 114   Temp: 97.5 °F (36.4 °C)   SpO2: 97%   Weight: 85.7 kg (189 lb)   Height: 157.5 cm (62\")       Physical Exam  Constitutional:       Appearance: Normal appearance.   HENT:      Mouth/Throat:      Comments: Chronically hoarse  Cardiovascular:      Rate and Rhythm: Regular rhythm. Tachycardia present.      Pulses: Normal pulses.      Heart sounds: Normal heart sounds.   Pulmonary:      Effort: Pulmonary effort is normal.      Breath sounds: Normal breath sounds.   Musculoskeletal:      Right lower leg: No edema.      Left lower leg: No edema.   Skin:     General: Skin is warm and dry.   Neurological:      Mental Status: She is alert.         Diagnostic Data:  Procedures  Lab Results   Component Value Date    TRIG 74 06/05/2020    HDL 91 (H) 06/05/2020     Lab Results   Component Value Date    GLUCOSE 135 (H) 06/05/2020    BUN 12 06/05/2020    CREATININE 0.70 06/05/2020     06/05/2020    K 4.1 06/05/2020     06/05/2020    CO2 19.0 (L) 06/05/2020     Lab Results   Component Value Date    HGBA1C 5.40 06/05/2020     Lab Results   Component Value Date    WBC 10.72 06/05/2020    HGB 14.8 06/05/2020    HCT 44.3 06/05/2020     06/05/2020       Assessment:   Diagnosis Plan   1. Chest pain, unspecified type  Case Request Cath Lab: Coronary angiography       Plan:    1.    Persistent chest pain, dyspnea on exertion  -Stress test did not show perfusion/ekg  evidence of ischemia, she did have chest pain while exercising on the treadmill  -echo with normal EF, grade 1 diastolic dysfunction, no significant valve disease  -Normal RVSP on echo  -No restriction or obstruction  -add ASA 81mg and diltiazem 120 mg daily  -Discussed with persistent chest pain and pressure since stress test in September without other specific pathology is identified could perform cardiac catheterization for definitive diagnosis/exclusion of CAD.  Discussed the risks and benefits of the procedure with her " and she would like to proceed.  -We will schedule the heart catheterization with Dr. Burnett     2..  Laryngitis and stridor  -Follows with GI for GERD  -Normal EGD and pH study, there was evidence of reflux    3.  PFO  Incidental finding, does not require work-up      Follow-up after procedure    Rylan Marin MD [facility]

## 2021-01-19 ENCOUNTER — OFFICE VISIT (OUTPATIENT)
Dept: CARDIOLOGY | Facility: CLINIC | Age: 61
End: 2021-01-19

## 2021-01-19 VITALS
SYSTOLIC BLOOD PRESSURE: 118 MMHG | WEIGHT: 189 LBS | BODY MASS INDEX: 34.78 KG/M2 | HEART RATE: 114 BPM | TEMPERATURE: 97.5 F | OXYGEN SATURATION: 97 % | HEIGHT: 62 IN | DIASTOLIC BLOOD PRESSURE: 76 MMHG

## 2021-01-19 DIAGNOSIS — R07.9 CHEST PAIN, UNSPECIFIED TYPE: Primary | ICD-10-CM

## 2021-01-19 PROCEDURE — 99214 OFFICE O/P EST MOD 30 MIN: CPT | Performed by: INTERNAL MEDICINE

## 2021-01-19 RX ORDER — RIZATRIPTAN BENZOATE 10 MG/1
10 TABLET ORAL ONCE AS NEEDED
COMMUNITY

## 2021-01-19 RX ORDER — DILTIAZEM HYDROCHLORIDE 120 MG/1
120 CAPSULE, EXTENDED RELEASE ORAL DAILY
Qty: 30 CAPSULE | Refills: 11 | Status: SHIPPED | OUTPATIENT
Start: 2021-01-19 | End: 2021-04-12 | Stop reason: HOSPADM

## 2021-01-19 RX ORDER — ASPIRIN 81 MG/1
81 TABLET ORAL DAILY
Qty: 30 TABLET | Refills: 6 | Status: SHIPPED | OUTPATIENT
Start: 2021-01-19 | End: 2021-03-30

## 2021-01-20 ENCOUNTER — PREP FOR SURGERY (OUTPATIENT)
Dept: OTHER | Facility: HOSPITAL | Age: 61
End: 2021-01-20

## 2021-01-20 DIAGNOSIS — I20.0 UNSTABLE ANGINA (HCC): Primary | ICD-10-CM

## 2021-01-20 PROBLEM — R07.9 CHEST PAIN: Status: ACTIVE | Noted: 2021-01-20

## 2021-01-20 RX ORDER — SODIUM CHLORIDE 9 MG/ML
3 INJECTION, SOLUTION INTRAVENOUS CONTINUOUS
Status: CANCELLED | OUTPATIENT
Start: 2021-01-20 | End: 2021-01-20

## 2021-01-20 RX ORDER — SODIUM CHLORIDE 0.9 % (FLUSH) 0.9 %
3 SYRINGE (ML) INJECTION EVERY 12 HOURS SCHEDULED
Status: CANCELLED | OUTPATIENT
Start: 2021-01-20

## 2021-01-20 RX ORDER — NITROGLYCERIN 0.4 MG/1
0.4 TABLET SUBLINGUAL
Status: CANCELLED | OUTPATIENT
Start: 2021-01-20

## 2021-01-20 RX ORDER — ACETAMINOPHEN 325 MG/1
650 TABLET ORAL EVERY 4 HOURS PRN
Status: CANCELLED | OUTPATIENT
Start: 2021-01-20

## 2021-01-20 RX ORDER — SODIUM CHLORIDE 0.9 % (FLUSH) 0.9 %
10 SYRINGE (ML) INJECTION AS NEEDED
Status: CANCELLED | OUTPATIENT
Start: 2021-01-20

## 2021-01-22 ENCOUNTER — APPOINTMENT (OUTPATIENT)
Dept: PREADMISSION TESTING | Facility: HOSPITAL | Age: 61
End: 2021-01-22

## 2021-01-22 LAB — SARS-COV-2 RNA RESP QL NAA+PROBE: NOT DETECTED

## 2021-01-22 PROCEDURE — C9803 HOPD COVID-19 SPEC COLLECT: HCPCS

## 2021-01-22 PROCEDURE — U0004 COV-19 TEST NON-CDC HGH THRU: HCPCS

## 2021-01-25 ENCOUNTER — HOSPITAL ENCOUNTER (OUTPATIENT)
Facility: HOSPITAL | Age: 61
Discharge: HOME | End: 2021-01-25
Attending: INTERNAL MEDICINE | Admitting: INTERNAL MEDICINE

## 2021-01-25 VITALS
OXYGEN SATURATION: 96 % | SYSTOLIC BLOOD PRESSURE: 108 MMHG | RESPIRATION RATE: 16 BRPM | HEART RATE: 78 BPM | DIASTOLIC BLOOD PRESSURE: 64 MMHG | WEIGHT: 191.2 LBS | HEIGHT: 62 IN | TEMPERATURE: 97.7 F | BODY MASS INDEX: 35.19 KG/M2

## 2021-01-25 DIAGNOSIS — I20.0 UNSTABLE ANGINA (HCC): ICD-10-CM

## 2021-01-25 DIAGNOSIS — R07.9 CHEST PAIN, UNSPECIFIED TYPE: ICD-10-CM

## 2021-01-25 PROBLEM — E78.2 MIXED HYPERLIPIDEMIA: Status: ACTIVE | Noted: 2021-01-25

## 2021-01-25 PROBLEM — E87.20 LACTIC ACIDOSIS: Status: RESOLVED | Noted: 2020-06-05 | Resolved: 2021-01-25

## 2021-01-25 PROBLEM — D72.829 LEUKOCYTOSIS: Status: RESOLVED | Noted: 2020-06-05 | Resolved: 2021-01-25

## 2021-01-25 PROBLEM — R06.09 DYSPNEA ON EXERTION: Status: ACTIVE | Noted: 2020-06-05

## 2021-01-25 LAB
ALBUMIN SERPL-MCNC: 4.1 G/DL (ref 3.5–5.2)
ALBUMIN/GLOB SERPL: 1.4 G/DL
ALP SERPL-CCNC: 79 U/L (ref 39–117)
ALT SERPL W P-5'-P-CCNC: 24 U/L (ref 1–33)
ANION GAP SERPL CALCULATED.3IONS-SCNC: 11 MMOL/L (ref 5–15)
AST SERPL-CCNC: 24 U/L (ref 1–32)
BASOPHILS # BLD AUTO: 0.04 10*3/MM3 (ref 0–0.2)
BASOPHILS NFR BLD AUTO: 0.8 % (ref 0–1.5)
BILIRUB SERPL-MCNC: 0.3 MG/DL (ref 0–1.2)
BUN SERPL-MCNC: 8 MG/DL (ref 6–20)
BUN/CREAT SERPL: 11 (ref 7–25)
CALCIUM SPEC-SCNC: 9.1 MG/DL (ref 8.6–10.5)
CHLORIDE SERPL-SCNC: 104 MMOL/L (ref 98–107)
CHOLEST SERPL-MCNC: 156 MG/DL (ref 0–200)
CO2 SERPL-SCNC: 24 MMOL/L (ref 22–29)
CREAT BLDA-MCNC: 0.7 MG/DL (ref 0.6–1.3)
CREAT SERPL-MCNC: 0.73 MG/DL (ref 0.57–1)
DEPRECATED RDW RBC AUTO: 43.7 FL (ref 37–54)
EOSINOPHIL # BLD AUTO: 0.11 10*3/MM3 (ref 0–0.4)
EOSINOPHIL NFR BLD AUTO: 2.1 % (ref 0.3–6.2)
ERYTHROCYTE [DISTWIDTH] IN BLOOD BY AUTOMATED COUNT: 13.3 % (ref 12.3–15.4)
GFR SERPL CREATININE-BSD FRML MDRD: 82 ML/MIN/1.73
GLOBULIN UR ELPH-MCNC: 3 GM/DL
GLUCOSE SERPL-MCNC: 119 MG/DL (ref 65–99)
HBA1C MFR BLD: 5.4 % (ref 4.8–5.6)
HCT VFR BLD AUTO: 41.8 % (ref 34–46.6)
HDLC SERPL-MCNC: 60 MG/DL (ref 40–60)
HGB BLD-MCNC: 13.9 G/DL (ref 12–15.9)
IMM GRANULOCYTES # BLD AUTO: 0.02 10*3/MM3 (ref 0–0.05)
IMM GRANULOCYTES NFR BLD AUTO: 0.4 % (ref 0–0.5)
LDLC SERPL CALC-MCNC: 60 MG/DL (ref 0–100)
LDLC/HDLC SERPL: 0.84 {RATIO}
LYMPHOCYTES # BLD AUTO: 1.51 10*3/MM3 (ref 0.7–3.1)
LYMPHOCYTES NFR BLD AUTO: 28.8 % (ref 19.6–45.3)
MCH RBC QN AUTO: 29.9 PG (ref 26.6–33)
MCHC RBC AUTO-ENTMCNC: 33.3 G/DL (ref 31.5–35.7)
MCV RBC AUTO: 89.9 FL (ref 79–97)
MONOCYTES # BLD AUTO: 0.37 10*3/MM3 (ref 0.1–0.9)
MONOCYTES NFR BLD AUTO: 7.1 % (ref 5–12)
NEUTROPHILS NFR BLD AUTO: 3.19 10*3/MM3 (ref 1.7–7)
NEUTROPHILS NFR BLD AUTO: 60.8 % (ref 42.7–76)
NRBC BLD AUTO-RTO: 0 /100 WBC (ref 0–0.2)
PLATELET # BLD AUTO: 298 10*3/MM3 (ref 140–450)
PMV BLD AUTO: 9.8 FL (ref 6–12)
POTASSIUM SERPL-SCNC: 3.9 MMOL/L (ref 3.5–5.2)
PROT SERPL-MCNC: 7.1 G/DL (ref 6–8.5)
RBC # BLD AUTO: 4.65 10*6/MM3 (ref 3.77–5.28)
SODIUM SERPL-SCNC: 139 MMOL/L (ref 136–145)
TRIGL SERPL-MCNC: 228 MG/DL (ref 0–150)
VLDLC SERPL-MCNC: 36 MG/DL (ref 5–40)
WBC # BLD AUTO: 5.24 10*3/MM3 (ref 3.4–10.8)

## 2021-01-25 PROCEDURE — C1769 GUIDE WIRE: HCPCS | Performed by: INTERNAL MEDICINE

## 2021-01-25 PROCEDURE — 80053 COMPREHEN METABOLIC PANEL: CPT | Performed by: NURSE PRACTITIONER

## 2021-01-25 PROCEDURE — 25010000002 HEPARIN (PORCINE) PER 1000 UNITS: Performed by: INTERNAL MEDICINE

## 2021-01-25 PROCEDURE — 80061 LIPID PANEL: CPT | Performed by: NURSE PRACTITIONER

## 2021-01-25 PROCEDURE — 25010000002 MIDAZOLAM PER 1 MG: Performed by: INTERNAL MEDICINE

## 2021-01-25 PROCEDURE — 25010000002 FENTANYL CITRATE (PF) 100 MCG/2ML SOLUTION: Performed by: INTERNAL MEDICINE

## 2021-01-25 PROCEDURE — 0 IOPAMIDOL PER 1 ML: Performed by: INTERNAL MEDICINE

## 2021-01-25 PROCEDURE — 93458 L HRT ARTERY/VENTRICLE ANGIO: CPT | Performed by: INTERNAL MEDICINE

## 2021-01-25 PROCEDURE — 83036 HEMOGLOBIN GLYCOSYLATED A1C: CPT | Performed by: NURSE PRACTITIONER

## 2021-01-25 PROCEDURE — 85025 COMPLETE CBC W/AUTO DIFF WBC: CPT | Performed by: NURSE PRACTITIONER

## 2021-01-25 PROCEDURE — C1894 INTRO/SHEATH, NON-LASER: HCPCS | Performed by: INTERNAL MEDICINE

## 2021-01-25 PROCEDURE — 82565 ASSAY OF CREATININE: CPT

## 2021-01-25 RX ORDER — SODIUM CHLORIDE 0.9 % (FLUSH) 0.9 %
3 SYRINGE (ML) INJECTION EVERY 12 HOURS SCHEDULED
Status: DISCONTINUED | OUTPATIENT
Start: 2021-01-25 | End: 2021-01-25 | Stop reason: HOSPADM

## 2021-01-25 RX ORDER — MIDAZOLAM HYDROCHLORIDE 1 MG/ML
INJECTION INTRAMUSCULAR; INTRAVENOUS AS NEEDED
Status: DISCONTINUED | OUTPATIENT
Start: 2021-01-25 | End: 2021-01-25 | Stop reason: HOSPADM

## 2021-01-25 RX ORDER — LIDOCAINE HYDROCHLORIDE 10 MG/ML
INJECTION, SOLUTION EPIDURAL; INFILTRATION; INTRACAUDAL; PERINEURAL AS NEEDED
Status: DISCONTINUED | OUTPATIENT
Start: 2021-01-25 | End: 2021-01-25 | Stop reason: HOSPADM

## 2021-01-25 RX ORDER — FENTANYL CITRATE 50 UG/ML
INJECTION, SOLUTION INTRAMUSCULAR; INTRAVENOUS AS NEEDED
Status: DISCONTINUED | OUTPATIENT
Start: 2021-01-25 | End: 2021-01-25 | Stop reason: HOSPADM

## 2021-01-25 RX ORDER — SODIUM CHLORIDE 9 MG/ML
3 INJECTION, SOLUTION INTRAVENOUS CONTINUOUS
Status: ACTIVE | OUTPATIENT
Start: 2021-01-25 | End: 2021-01-25

## 2021-01-25 RX ORDER — SODIUM CHLORIDE 0.9 % (FLUSH) 0.9 %
10 SYRINGE (ML) INJECTION AS NEEDED
Status: DISCONTINUED | OUTPATIENT
Start: 2021-01-25 | End: 2021-01-25 | Stop reason: HOSPADM

## 2021-01-25 RX ORDER — ACETAMINOPHEN 325 MG/1
650 TABLET ORAL EVERY 4 HOURS PRN
Status: DISCONTINUED | OUTPATIENT
Start: 2021-01-25 | End: 2021-01-25 | Stop reason: HOSPADM

## 2021-01-25 RX ORDER — NITROGLYCERIN 0.4 MG/1
0.4 TABLET SUBLINGUAL
Status: DISCONTINUED | OUTPATIENT
Start: 2021-01-25 | End: 2021-01-25 | Stop reason: HOSPADM

## 2021-01-25 RX ADMIN — SODIUM CHLORIDE 3 ML/KG/HR: 9 INJECTION, SOLUTION INTRAVENOUS at 08:31

## 2021-01-25 NOTE — INTERVAL H&P NOTE
H&P reviewed. The patient was examined and there are no changes to the H&P.       No changes to the physical exam    Active Hospital Problems    Diagnosis   • **Unstable angina (CMS/HCC)     · Exercise nuclear stress (9/9/2020): 4/10 chest pain with exercise.  No EKG changes.  No ischemia/infarct on nuclear perfusion images.  LVEF >70%.     • Dyspnea on exertion     · Echo (6/5/2020): LVEF=61 - 65%.  Grade 1 diastolic dysfunction.  Positive bubble study.  No significant valvular disease.     • Mixed hyperlipidemia       Patient is being referred by Dr. Rylan Marin to undergo cardiac catheterization for ongoing CCS/NYHA class II symptoms.  The risks and benefits of the procedure were discussed and the patient is agreeable to proceed.  She has been tested for COVID-19 and results are negative.    Plan:  · Lab results pending and if acceptable proceed with cardiac cath via the right radial approach  · Further recommendations to follow    PRANAV Eaton

## 2021-03-30 ENCOUNTER — OFFICE VISIT (OUTPATIENT)
Dept: CARDIOLOGY | Facility: CLINIC | Age: 61
End: 2021-03-30

## 2021-03-30 VITALS
BODY MASS INDEX: 34.96 KG/M2 | HEART RATE: 105 BPM | DIASTOLIC BLOOD PRESSURE: 70 MMHG | WEIGHT: 190 LBS | HEIGHT: 62 IN | SYSTOLIC BLOOD PRESSURE: 122 MMHG | OXYGEN SATURATION: 92 %

## 2021-03-30 DIAGNOSIS — E78.5 HYPERLIPIDEMIA, UNSPECIFIED HYPERLIPIDEMIA TYPE: Primary | ICD-10-CM

## 2021-03-30 PROCEDURE — 99213 OFFICE O/P EST LOW 20 MIN: CPT | Performed by: INTERNAL MEDICINE

## 2021-03-30 RX ORDER — LEVETIRACETAM 500 MG/1
500 TABLET ORAL 2 TIMES DAILY
COMMUNITY

## 2021-03-30 RX ORDER — TOPIRAMATE 50 MG/1
1 TABLET, FILM COATED ORAL 2 TIMES DAILY
COMMUNITY
Start: 2021-03-17

## 2021-03-30 NOTE — PROGRESS NOTES
[facility] Cardiology  Follow Up Visit  Delphine Das  1960    VISIT DATE:  03/30/21    PCP:   Timur Virgen MD  [address] [place] [id number]          CC:  Chest Pain      Problem List:  1.  GERD  2.  Hiatal hernia-small  3.  Laryngitis  4.  RBBB  5.  Migraine  6.  PFO  7.  HLD  8.  GERD  9.  Family hx CAD Sister and Mother     Echo: June 2020  · Estimated EF appears to be in the range of 61 - 65%.  · Left ventricular systolic function is normal.  · Left ventricular diastolic dysfunction (grade I) consistent with impaired relaxation.  · Saline test results are positive for right to left atrial level shunt.  · No significant structural or functional valvular disease.     Cardiac cath January 2021:  Normal coronary arteries, normal LV filling pressure    History of Present Illness:  Delphine Das  Is a 60 y.o. female with pertinent cardiac history detailed above.  Patient following up for persistent chest pain and dyspnea on exertion.  Coronary arteries were normal on heart catheterization, normal LV filling pressures.  Symptoms are some improved taking diltiazem.  She still has some laryngitis symptoms and still has dyspnea with exertion.  But she is try to get back to walking 3 miles.  She will often take her inhaler before she goes walking.  Discussed with her with her heart cath results I think her symptoms are noncardiac in nature.  Her other cardiac risk factors like blood pressure and lipids are controlled      Patient Active Problem List    Diagnosis Date Noted   • Mixed hyperlipidemia 01/25/2021   • Gastroesophageal reflux disease 08/17/2020     Note Last Updated: 8/17/2020     Added automatically from request for surgery [id number]     • Hoarseness of voice 08/10/2020   • Hiatal hernia 06/06/2020   • GERD (gastroesophageal reflux disease) 06/06/2020   • Chest pain with normal angiography 06/05/2020     Note Last Updated: 1/25/2021     · Exercise nuclear stress (9/9/2020):  4/10 chest pain with exercise.  No EKG changes.  No ischemia/infarct on nuclear perfusion images.  LVEF >70%.  · Cardiac catheterization (1/25/2021): Normal coronary arteries.  Normal LV filling pressure     • Dyspnea on exertion 06/05/2020     Note Last Updated: 1/25/2021     · Echo (6/5/2020): LVEF=61 - 65%.  Grade 1 diastolic dysfunction.  Positive bubble study.  No significant valvular disease.     • Laryngitis 06/05/2020   • PFO (patent foramen ovale) : Suspected with atrial level shunt on bubble study echo 6/5/2020. 06/05/2020     Note Last Updated: 6/5/2020     Suspected with atrial level shunt on bubble study echo 6/5/2020.     • Migraines 06/05/2020   • Vocal cord dysfunction - suspected.  06/05/2020     Note Last Updated: 6/5/2020     - suspected.          No Known Allergies    Social History     Socioeconomic History   • Marital status:      Spouse name: Not on file   • Number of children: Not on file   • Years of education: Not on file   • Highest education level: Not on file   Tobacco Use   • Smoking status: Never Smoker   • Smokeless tobacco: Never Used   Vaping Use   • Vaping Use: Never used   Substance and Sexual Activity   • Alcohol use: Yes     Comment: 3-4 drinks a week   • Drug use: No   • Sexual activity: Defer       Family History   Problem Relation Age of Onset   • Colon polyps Mother    • Colon polyps Sister    • Colon cancer Brother    • No Known Problems Brother    • No Known Problems Brother    • Stroke Sister    • No Known Problems Sister    • No Known Problems Sister    • No Known Problems Sister    • No Known Problems Sister        Current Medications:    Current Outpatient Medications:   •  AJOVY 225 MG/1.5ML solution prefilled syringe, INJECT 1.5 ML AS DIRECTED SUBCUTANEOUSLY ONCE A MONTH, Disp: , Rfl: 3  •  amitriptyline (ELAVIL) 50 MG tablet, Take 50 mg by mouth every night at bedtime., Disp: , Rfl: 3  •  dexlansoprazole (DEXILANT) 60 MG capsule, Take 1 capsule by mouth  "daily, Disp: 30 capsule, Rfl: 11  •  dilTIAZem XR (DILACOR XR) 120 MG 24 hr capsule, Take 1 capsule by mouth Daily., Disp: 30 capsule, Rfl: 11  •  DULOXETINE HCL PO, Take 90 mg by mouth Daily., Disp: , Rfl:   •  estradiol (MINIVELLE, VIVELLE-DOT) 0.05 MG/24HR patch, APPLY 1 PATCH TWICE WEEKLY TO DRY SKIN (EXAMPLE: MON/THURS), Disp: , Rfl: 12  •  fluticasone (FLONASE) 50 MCG/ACT nasal spray, into the nostril(s) as directed by provider As Needed., Disp: , Rfl:   •  levETIRAcetam (KEPPRA) 500 MG tablet, Take 500 mg by mouth 2 (Two) Times a Day., Disp: , Rfl:   •  pravastatin (PRAVACHOL) 40 MG tablet, Take 40 mg by mouth every night at bedtime., Disp: , Rfl: 11  •  progesterone (PROMETRIUM) 100 MG capsule, Take  by mouth Daily., Disp: , Rfl:   •  promethazine (PHENERGAN) 25 MG tablet, TAKE ONE TABLET BY MOUTH THREE TIMES A DAY AS NEEDED FOR HEADACHE NAUSEA AND VOMITING, Disp: , Rfl: 0  •  rizatriptan (MAXALT) 10 MG tablet, Take 10 mg by mouth 1 (One) Time As Needed for Migraine. May repeat in 2 hours if needed, Disp: , Rfl:   •  Stiolto Respimat 2.5-2.5 MCG/ACT aerosol solution inhaler, 2 puffs As Needed., Disp: , Rfl:   •  tiZANidine (ZANAFLEX) 4 MG tablet, TAKE 1/2 TO 1 TABLET BY MOUTH TWO TIMES A DAY AS NEEDED FOR HEADACHE, Disp: , Rfl: 3  •  topiramate (TOPAMAX) 50 MG tablet, Take 1 tablet by mouth 2 (two) times a day., Disp: , Rfl:      Review of Systems   HENT: Positive for hoarse voice.    Cardiovascular: Negative for chest pain, dyspnea on exertion, irregular heartbeat, leg swelling and near-syncope.   Respiratory: Positive for cough and shortness of breath.        Vitals:    03/30/21 1539   BP: 122/70   Pulse: 105   SpO2: 92%   Weight: 86.2 kg (190 lb)   Height: 157.5 cm (62.01\")       Physical Exam  Constitutional:       Appearance: Normal appearance.   HENT:      Head: Normocephalic and atraumatic.   Cardiovascular:      Rate and Rhythm: Normal rate and regular rhythm.      Pulses: Normal pulses.      Heart " sounds: Normal heart sounds.      Comments: Normal radial pulses bilaterally  Pulmonary:      Effort: Pulmonary effort is normal.      Breath sounds: Normal breath sounds.   Abdominal:      Palpations: Abdomen is soft.   Neurological:      Mental Status: She is alert.         Diagnostic Data:  Procedures  Lab Results   Component Value Date    TRIG 228 (H) 01/25/2021    HDL 60 01/25/2021     Lab Results   Component Value Date    GLUCOSE 119 (H) 01/25/2021    BUN 8 01/25/2021    CREATININE 0.70 01/25/2021     01/25/2021    K 3.9 01/25/2021     01/25/2021    CO2 24.0 01/25/2021     Lab Results   Component Value Date    HGBA1C 5.40 01/25/2021     Lab Results   Component Value Date    WBC 5.24 01/25/2021    HGB 13.9 01/25/2021    HCT 41.8 01/25/2021     01/25/2021       Assessment:   Diagnosis Plan   1. Hyperlipidemia, unspecified hyperlipidemia type         Plan:    1.    Persistent chest pain, dyspnea on exertion  -Normal coronaries on heart cath  -echo with normal EF, grade 1 diastolic dysfunction, no significant valve disease.  Normal filling pressures on heart catheterization  -Normal RVSP on echo  -No restriction or obstruction on PFTs  -Continue remain off aspirin given absence of CAD  -can continue diltiazem given symptoms improved since starting     2.  Laryngitis and stridor  -Follows with GI for GERD  -Normal EGD and pH study, there was evidence of reflux  -still with some shortness of breath, advised to follow back up with ENT if symptoms worsen     3.  PFO  Incidental finding, does not additional work-up at this time    4.  HLD  On pravastatin  Total cholesterol 155, LDL 70  No changes made    Reviewed outside labs from January which showed no significant abnormalities    Patient can follow-up as needed    Rylan Marin MD [facility]

## 2021-04-10 ENCOUNTER — APPOINTMENT (OUTPATIENT)
Dept: CT IMAGING | Facility: HOSPITAL | Age: 61
End: 2021-04-10

## 2021-04-10 ENCOUNTER — HOSPITAL ENCOUNTER (OUTPATIENT)
Facility: HOSPITAL | Age: 61
Setting detail: OBSERVATION
Discharge: HOME | End: 2021-04-12
Attending: EMERGENCY MEDICINE | Admitting: INTERNAL MEDICINE

## 2021-04-10 ENCOUNTER — APPOINTMENT (OUTPATIENT)
Dept: GENERAL RADIOLOGY | Facility: HOSPITAL | Age: 61
End: 2021-04-10

## 2021-04-10 ENCOUNTER — APPOINTMENT (OUTPATIENT)
Dept: MRI IMAGING | Facility: HOSPITAL | Age: 61
End: 2021-04-10

## 2021-04-10 DIAGNOSIS — R20.0 NUMBNESS AND TINGLING OF UPPER AND LOWER EXTREMITIES OF BOTH SIDES: ICD-10-CM

## 2021-04-10 DIAGNOSIS — R20.2 NUMBNESS AND TINGLING OF UPPER AND LOWER EXTREMITIES OF BOTH SIDES: ICD-10-CM

## 2021-04-10 DIAGNOSIS — R47.01 EXPRESSIVE APHASIA: ICD-10-CM

## 2021-04-10 DIAGNOSIS — R41.82 ACUTE ALTERATION IN MENTAL STATUS: Primary | ICD-10-CM

## 2021-04-10 PROBLEM — R42 DIZZINESS: Status: ACTIVE | Noted: 2021-04-10

## 2021-04-10 PROBLEM — R47.9 DIFFICULTY SPEAKING: Status: ACTIVE | Noted: 2021-04-10

## 2021-04-10 PROBLEM — N17.9 AKI (ACUTE KIDNEY INJURY) (HCC): Status: ACTIVE | Noted: 2021-04-10

## 2021-04-10 LAB
ALBUMIN SERPL-MCNC: 4.7 G/DL (ref 3.5–5.2)
ALBUMIN/GLOB SERPL: 1.6 G/DL
ALP SERPL-CCNC: 84 U/L (ref 39–117)
ALT SERPL W P-5'-P-CCNC: 20 U/L (ref 1–33)
ANION GAP SERPL CALCULATED.3IONS-SCNC: 11 MMOL/L (ref 5–15)
APTT PPP: 27.1 SECONDS (ref 22–39)
AST SERPL-CCNC: 29 U/L (ref 1–32)
BASOPHILS # BLD AUTO: 0.02 10*3/MM3 (ref 0–0.2)
BASOPHILS NFR BLD AUTO: 0.4 % (ref 0–1.5)
BILIRUB SERPL-MCNC: 0.4 MG/DL (ref 0–1.2)
BILIRUB UR QL STRIP: NEGATIVE
BUN SERPL-MCNC: 11 MG/DL (ref 6–20)
BUN/CREAT SERPL: 10.4 (ref 7–25)
CALCIUM SPEC-SCNC: 8.7 MG/DL (ref 8.6–10.5)
CHLORIDE SERPL-SCNC: 107 MMOL/L (ref 98–107)
CLARITY UR: CLEAR
CO2 SERPL-SCNC: 20 MMOL/L (ref 22–29)
COLOR UR: YELLOW
CREAT SERPL-MCNC: 1.06 MG/DL (ref 0.57–1)
DEPRECATED RDW RBC AUTO: 44.7 FL (ref 37–54)
EOSINOPHIL # BLD AUTO: 0.1 10*3/MM3 (ref 0–0.4)
EOSINOPHIL NFR BLD AUTO: 1.8 % (ref 0.3–6.2)
ERYTHROCYTE [DISTWIDTH] IN BLOOD BY AUTOMATED COUNT: 13.6 % (ref 12.3–15.4)
GFR SERPL CREATININE-BSD FRML MDRD: 53 ML/MIN/1.73
GLOBULIN UR ELPH-MCNC: 3 GM/DL
GLUCOSE BLDC GLUCOMTR-MCNC: 97 MG/DL (ref 70–130)
GLUCOSE SERPL-MCNC: 112 MG/DL (ref 65–99)
GLUCOSE UR STRIP-MCNC: NEGATIVE MG/DL
HCT VFR BLD AUTO: 45.7 % (ref 34–46.6)
HGB BLD-MCNC: 15 G/DL (ref 12–15.9)
HGB UR QL STRIP.AUTO: NEGATIVE
HOLD SPECIMEN: NORMAL
IMM GRANULOCYTES # BLD AUTO: 0.01 10*3/MM3 (ref 0–0.05)
IMM GRANULOCYTES NFR BLD AUTO: 0.2 % (ref 0–0.5)
INR PPP: 1.03 (ref 0.85–1.16)
KETONES UR QL STRIP: NEGATIVE
LEUKOCYTE ESTERASE UR QL STRIP.AUTO: NEGATIVE
LIPASE SERPL-CCNC: 16 U/L (ref 13–60)
LYMPHOCYTES # BLD AUTO: 1.88 10*3/MM3 (ref 0.7–3.1)
LYMPHOCYTES NFR BLD AUTO: 33.4 % (ref 19.6–45.3)
MCH RBC QN AUTO: 29.7 PG (ref 26.6–33)
MCHC RBC AUTO-ENTMCNC: 32.8 G/DL (ref 31.5–35.7)
MCV RBC AUTO: 90.5 FL (ref 79–97)
MONOCYTES # BLD AUTO: 0.39 10*3/MM3 (ref 0.1–0.9)
MONOCYTES NFR BLD AUTO: 6.9 % (ref 5–12)
NEUTROPHILS NFR BLD AUTO: 3.23 10*3/MM3 (ref 1.7–7)
NEUTROPHILS NFR BLD AUTO: 57.3 % (ref 42.7–76)
NITRITE UR QL STRIP: NEGATIVE
NRBC BLD AUTO-RTO: 0 /100 WBC (ref 0–0.2)
NT-PROBNP SERPL-MCNC: 26 PG/ML (ref 0–900)
PH UR STRIP.AUTO: 7.5 [PH] (ref 5–8)
PLATELET # BLD AUTO: 272 10*3/MM3 (ref 140–450)
PMV BLD AUTO: 9.9 FL (ref 6–12)
POTASSIUM SERPL-SCNC: 4 MMOL/L (ref 3.5–5.2)
PROT SERPL-MCNC: 7.7 G/DL (ref 6–8.5)
PROT UR QL STRIP: NEGATIVE
PROTHROMBIN TIME: 13.2 SECONDS (ref 11.4–14.4)
RBC # BLD AUTO: 5.05 10*6/MM3 (ref 3.77–5.28)
SODIUM SERPL-SCNC: 138 MMOL/L (ref 136–145)
SP GR UR STRIP: 1.01 (ref 1–1.03)
TROPONIN T SERPL-MCNC: <0.01 NG/ML (ref 0–0.03)
TROPONIN T SERPL-MCNC: <0.01 NG/ML (ref 0–0.03)
UROBILINOGEN UR QL STRIP: NORMAL
WBC # BLD AUTO: 5.63 10*3/MM3 (ref 3.4–10.8)
WHOLE BLOOD HOLD SPECIMEN: NORMAL
WHOLE BLOOD HOLD SPECIMEN: NORMAL

## 2021-04-10 PROCEDURE — 80053 COMPREHEN METABOLIC PANEL: CPT | Performed by: EMERGENCY MEDICINE

## 2021-04-10 PROCEDURE — 85025 COMPLETE CBC W/AUTO DIFF WBC: CPT | Performed by: EMERGENCY MEDICINE

## 2021-04-10 PROCEDURE — G0378 HOSPITAL OBSERVATION PER HR: HCPCS

## 2021-04-10 PROCEDURE — 96361 HYDRATE IV INFUSION ADD-ON: CPT

## 2021-04-10 PROCEDURE — 96375 TX/PRO/DX INJ NEW DRUG ADDON: CPT

## 2021-04-10 PROCEDURE — 0 GADOBENATE DIMEGLUMINE 529 MG/ML SOLUTION: Performed by: EMERGENCY MEDICINE

## 2021-04-10 PROCEDURE — A9577 INJ MULTIHANCE: HCPCS | Performed by: EMERGENCY MEDICINE

## 2021-04-10 PROCEDURE — 82962 GLUCOSE BLOOD TEST: CPT

## 2021-04-10 PROCEDURE — 81003 URINALYSIS AUTO W/O SCOPE: CPT | Performed by: EMERGENCY MEDICINE

## 2021-04-10 PROCEDURE — 85730 THROMBOPLASTIN TIME PARTIAL: CPT | Performed by: EMERGENCY MEDICINE

## 2021-04-10 PROCEDURE — 83690 ASSAY OF LIPASE: CPT | Performed by: EMERGENCY MEDICINE

## 2021-04-10 PROCEDURE — 83880 ASSAY OF NATRIURETIC PEPTIDE: CPT | Performed by: EMERGENCY MEDICINE

## 2021-04-10 PROCEDURE — 71045 X-RAY EXAM CHEST 1 VIEW: CPT

## 2021-04-10 PROCEDURE — 84484 ASSAY OF TROPONIN QUANT: CPT | Performed by: EMERGENCY MEDICINE

## 2021-04-10 PROCEDURE — 93005 ELECTROCARDIOGRAM TRACING: CPT | Performed by: EMERGENCY MEDICINE

## 2021-04-10 PROCEDURE — 85610 PROTHROMBIN TIME: CPT | Performed by: EMERGENCY MEDICINE

## 2021-04-10 PROCEDURE — 70544 MR ANGIOGRAPHY HEAD W/O DYE: CPT

## 2021-04-10 PROCEDURE — 70551 MRI BRAIN STEM W/O DYE: CPT

## 2021-04-10 PROCEDURE — 99220 PR INITIAL OBSERVATION CARE/DAY 70 MINUTES: CPT | Performed by: INTERNAL MEDICINE

## 2021-04-10 PROCEDURE — 99285 EMERGENCY DEPT VISIT HI MDM: CPT

## 2021-04-10 PROCEDURE — 70548 MR ANGIOGRAPHY NECK W/DYE: CPT

## 2021-04-10 PROCEDURE — 25010000002 DIPHENHYDRAMINE PER 50 MG: Performed by: EMERGENCY MEDICINE

## 2021-04-10 PROCEDURE — 70450 CT HEAD/BRAIN W/O DYE: CPT

## 2021-04-10 PROCEDURE — 96374 THER/PROPH/DIAG INJ IV PUSH: CPT

## 2021-04-10 RX ORDER — DIPHENHYDRAMINE HYDROCHLORIDE 50 MG/ML
25 INJECTION INTRAMUSCULAR; INTRAVENOUS ONCE
Status: COMPLETED | OUTPATIENT
Start: 2021-04-10 | End: 2021-04-10

## 2021-04-10 RX ORDER — TOPIRAMATE 25 MG/1
50 TABLET ORAL EVERY 12 HOURS SCHEDULED
Status: DISCONTINUED | OUTPATIENT
Start: 2021-04-10 | End: 2021-04-12 | Stop reason: HOSPADM

## 2021-04-10 RX ORDER — SODIUM CHLORIDE 0.9 % (FLUSH) 0.9 %
10 SYRINGE (ML) INJECTION EVERY 12 HOURS SCHEDULED
Status: DISCONTINUED | OUTPATIENT
Start: 2021-04-10 | End: 2021-04-12 | Stop reason: HOSPADM

## 2021-04-10 RX ORDER — SODIUM CHLORIDE 9 MG/ML
75 INJECTION, SOLUTION INTRAVENOUS CONTINUOUS
Status: ACTIVE | OUTPATIENT
Start: 2021-04-10 | End: 2021-04-11

## 2021-04-10 RX ORDER — PANTOPRAZOLE SODIUM 40 MG/1
40 TABLET, DELAYED RELEASE ORAL
Status: DISCONTINUED | OUTPATIENT
Start: 2021-04-10 | End: 2021-04-12 | Stop reason: HOSPADM

## 2021-04-10 RX ORDER — ACETAMINOPHEN 325 MG/1
650 TABLET ORAL EVERY 6 HOURS PRN
Status: DISCONTINUED | OUTPATIENT
Start: 2021-04-10 | End: 2021-04-12 | Stop reason: HOSPADM

## 2021-04-10 RX ORDER — LEVETIRACETAM 500 MG/1
500 TABLET ORAL 2 TIMES DAILY
Status: DISCONTINUED | OUTPATIENT
Start: 2021-04-10 | End: 2021-04-12 | Stop reason: HOSPADM

## 2021-04-10 RX ORDER — SODIUM CHLORIDE 0.9 % (FLUSH) 0.9 %
10 SYRINGE (ML) INJECTION AS NEEDED
Status: DISCONTINUED | OUTPATIENT
Start: 2021-04-10 | End: 2021-04-12 | Stop reason: HOSPADM

## 2021-04-10 RX ORDER — AMITRIPTYLINE HYDROCHLORIDE 50 MG/1
50 TABLET, FILM COATED ORAL NIGHTLY
Status: DISCONTINUED | OUTPATIENT
Start: 2021-04-10 | End: 2021-04-12 | Stop reason: HOSPADM

## 2021-04-10 RX ORDER — FAMOTIDINE 10 MG/ML
20 INJECTION, SOLUTION INTRAVENOUS ONCE
Status: COMPLETED | OUTPATIENT
Start: 2021-04-10 | End: 2021-04-10

## 2021-04-10 RX ORDER — ASPIRIN 325 MG
325 TABLET ORAL ONCE
Status: COMPLETED | OUTPATIENT
Start: 2021-04-10 | End: 2021-04-10

## 2021-04-10 RX ORDER — PRAVASTATIN SODIUM 40 MG
40 TABLET ORAL NIGHTLY
Status: DISCONTINUED | OUTPATIENT
Start: 2021-04-10 | End: 2021-04-12 | Stop reason: HOSPADM

## 2021-04-10 RX ADMIN — TOPIRAMATE 50 MG: 25 TABLET, FILM COATED ORAL at 22:17

## 2021-04-10 RX ADMIN — SODIUM CHLORIDE, PRESERVATIVE FREE 10 ML: 5 INJECTION INTRAVENOUS at 22:18

## 2021-04-10 RX ADMIN — DIPHENHYDRAMINE HYDROCHLORIDE 25 MG: 50 INJECTION INTRAMUSCULAR; INTRAVENOUS at 15:07

## 2021-04-10 RX ADMIN — FAMOTIDINE 20 MG: 10 INJECTION, SOLUTION INTRAVENOUS at 15:07

## 2021-04-10 RX ADMIN — LEVETIRACETAM 500 MG: 500 TABLET ORAL at 22:17

## 2021-04-10 RX ADMIN — PRAVASTATIN SODIUM 40 MG: 40 TABLET ORAL at 22:17

## 2021-04-10 RX ADMIN — ASPIRIN 325 MG ORAL TABLET 325 MG: 325 PILL ORAL at 15:45

## 2021-04-10 RX ADMIN — AMITRIPTYLINE HYDROCHLORIDE 50 MG: 50 TABLET, FILM COATED ORAL at 22:18

## 2021-04-10 RX ADMIN — GADOBENATE DIMEGLUMINE 13 ML: 529 INJECTION, SOLUTION INTRAVENOUS at 17:14

## 2021-04-10 RX ADMIN — PANTOPRAZOLE SODIUM 40 MG: 40 TABLET, DELAYED RELEASE ORAL at 22:18

## 2021-04-10 RX ADMIN — SODIUM CHLORIDE 75 ML/HR: 9 INJECTION, SOLUTION INTRAVENOUS at 22:48

## 2021-04-10 RX ADMIN — SODIUM CHLORIDE, POTASSIUM CHLORIDE, SODIUM LACTATE AND CALCIUM CHLORIDE 1000 ML: 600; 310; 30; 20 INJECTION, SOLUTION INTRAVENOUS at 15:45

## 2021-04-10 NOTE — ED PROVIDER NOTES
Subjective   The patient presents to the emergency department secondary to alteration in mental status with numbness.  Patient was at [facility] after she had eaten when she developed sudden onset left-sided numbness that then extended throughout the entire body from the neck down.  Patient reported some shortness of breath and generalized weakness with confusion.  EMS states they were having a difficult time getting her to be able to move out of the cedillo.  Patient states she ate spicy chicken and was not exposed to anything that she had not had before.  There is no unilateral deficit reported.  On arrival, the patient is mildly somnolent with hoarse voice.  She states the numbness is resolved.  Patient denies having history of similar.  She does have a prior history of migraines.  No fever or chills.  No headache.  No chest pain reported.  No vomiting or diarrhea.      History provided by:  Patient and EMS personnel      Review of Systems   Constitutional: Negative for chills and fever.   Respiratory: Negative for chest tightness, shortness of breath and wheezing.    Cardiovascular: Negative for chest pain and palpitations.   Gastrointestinal: Negative for abdominal pain, diarrhea, nausea and vomiting.   Musculoskeletal: Negative for back pain and neck pain.   Skin: Negative for pallor and rash.   Psychiatric/Behavioral: Negative.    All other systems reviewed and are negative.      Past Medical History:   Diagnosis Date   • Asthma    • Colon polyp    • GERD (gastroesophageal reflux disease)    • Hiatal hernia    • Hyperlipidemia    • Hypertension    • Migraine    • PFO (patent foramen ovale)        No Known Allergies    Past Surgical History:   Procedure Laterality Date   • CARDIAC CATHETERIZATION N/A 1/25/2021    Procedure: LEFT HEART CATH;  Surgeon: Adelso Burnett IV, MD;  Location: [facility] INVASIVE LOCATION;  Service: Cardiovascular;  Laterality: N/A;   • CARDIAC CATHETERIZATION N/A 1/25/2021     Procedure: Coronary angiography;  Surgeon: Adelso Burnett IV, MD;  Location:  [facility] CATH INVASIVE LOCATION;  Service: Cardiovascular;  Laterality: N/A;   • COLONOSCOPY     • GASTRIC PH MONITORING 24HR N/A 9/1/2020    Procedure: GASTRIC PH MONITORING 24HR;  Surgeon: Cliff Sheffield MD;  Location:  [facility] ENDOSCOPY;  Service: General;  Laterality: N/A;  Probe successfully placed 25 at left nare   • UPPER GASTROINTESTINAL ENDOSCOPY         Family History   Problem Relation Age of Onset   • Colon polyps Mother    • Colon polyps Sister    • Colon cancer Brother    • No Known Problems Brother    • No Known Problems Brother    • Stroke Sister    • No Known Problems Sister    • No Known Problems Sister    • No Known Problems Sister    • No Known Problems Sister        Social History     Socioeconomic History   • Marital status:      Spouse name: Not on file   • Number of children: Not on file   • Years of education: Not on file   • Highest education level: Not on file   Tobacco Use   • Smoking status: Never Smoker   • Smokeless tobacco: Never Used   Vaping Use   • Vaping Use: Never used   Substance and Sexual Activity   • Alcohol use: Yes     Comment: 3-4 drinks a week   • Drug use: No   • Sexual activity: Defer           Objective   Physical Exam    Procedures           ED Course  ED Course as of Apr 11 0728   Sat Apr 10, 2021   1344 I evaluated the patient in the CT scanner with the stroke navigator.  We both agree that the patient's symptoms are not consistent with stroke.  I personally reviewed the CT scan as of being performed as well as report from radiology.  No evidence of mass or bleeding.  See report for details.    [RS]   1440 Stat MRIs ordered.  Nurse made aware.   MRI Angiogram Neck With Contrast [RS]   1545 Troponin T: <0.010 [RS]   1736 MRIs were read as no acute findings.  I went discussed the findings with Ms. Das and her family.  She does note that she goes to a [facility] and they have recently cut her Keppra.  Does not have a history of seizures.  She has been seen by Dr. Inderjit Whitehead in the past for migraines.    [ANSON]   6607 I paged the hospitalist.  .    [ANSON]   2424 Spoke to Dr. Joy she is good admit the patient.  Also spoke to Karla with the stroke team    [ANSON]      ED Course User Index  [ANSON] Justice Holder PA  [RS] Bony Rain MD                                           MDM  Number of Diagnoses or Management Options  Acute alteration in mental status: new and requires workup  Expressive aphasia: new and requires workup  Numbness and tingling of upper and lower extremities of both sides: new and requires workup     Amount and/or Complexity of Data Reviewed  Clinical lab tests: reviewed and ordered  Tests in the radiology section of CPT®: reviewed and ordered  Tests in the medicine section of CPT®: reviewed and ordered  Discuss the patient with other providers: yes        Final diagnoses:   Acute alteration in mental status   Numbness and tingling of upper and lower extremities of both sides   Expressive aphasia       ED Disposition  ED Disposition     ED Disposition Condition Comment    Decision to Admit  Level of Care: Telemetry [5]   Diagnosis: Acute alteration in mental status [2560112]   Bed Request Comments: noncovid tele - neuro floor please            No follow-up provider specified.       Medication List      No changes were made to your prescriptions during this visit.          Justice Holder PA  04/11/21 0749

## 2021-04-10 NOTE — H&P
"    [facility] Medicine Services  HISTORY AND PHYSICAL    Patient Name: Delphine Das  : 1960  MRN: [id number]  Primary Care Physician: Timur Virgen MD  Date of admission: 4/10/2021    Subjective   Subjective     Chief Complaint:  Numbness and weakness    HPI:  Delphine Das is a 60 y.o. female with past medical history significant for migraines, laryngitis, hyperlipidemia, asthma, hiatal hernia, GERD, patent foramen ovale who presents to [facility] secondary to multiple complaints. The patient was eating at [employer] with her daughter this evening. They had finished their meal, the patient ate spicy chicken. Afterwards the patient began feeling left foot numbness. The numbness radiated up her left side of her body and she began having difficulty expressing her words. She states she then began to have anxiety. She felt dizziness. EMS was called. She then felt some numbness traveling up the right side of her body. She continued to have difficulty expressing her words. Afterwards she has felt her voice has been raspy and mild expressive aphasia. She reports that her numbness has resolved however her lower limbs feel heavy. She denies any chest pain, shortness of breath, nausea, vomiting, abdominal pain, back pain. Denies fever, chills, diaphoresis. Further denies arm pain, jaw pain. She has a history of migraine disorder and takes Keppra, Topamax, tizanidine. Also, has a history of anxiety and depression and takes Cymbalta as well as Elavil.    Upon arrival to the ER the patient's vital signs were normal. She had a mildly low normal blood pressure with systolic pressure in the 90s. She states her pressure is normally \"normal.\" The patient CBC and CMP were unremarkable.  Her troponin and BNP were negative. Her lipase was normal. Her chest x-ray was normal. Her CT head without contrast was negative. MRI neck and head angiogram were negative without evidence of " ischemia, hemorrhage or mass or mass-effect. There was  no flow-limiting stenosis, large vessel occlusion or aneurysmal dilation.      COVID Details: [x] No Symptoms  Symptoms: [] Fever []  Cough [] Shortness of breath [] Change in taste or smell  Risks:   [] Direct Exposure [] High risk facility         Review of Systems   Constitutional: Negative for chills, fatigue and fever.   Respiratory: Negative for cough, chest tightness and shortness of breath.    Cardiovascular: Negative for leg swelling.   Gastrointestinal: Negative for abdominal pain, constipation, diarrhea, nausea and vomiting.   Genitourinary: Negative for dysuria and hematuria.   Musculoskeletal: Negative for back pain.   Neurological: Positive for dizziness, speech difficulty, weakness, light-headedness, numbness and headaches. Negative for tremors, seizures, syncope and facial asymmetry.   All other systems reviewed and are negative.       Personal History     Past Medical History:   Diagnosis Date   • Asthma    • Colon polyp    • GERD (gastroesophageal reflux disease)    • Hiatal hernia    • Hyperlipidemia    • Hypertension    • Migraine    • PFO (patent foramen ovale)        Past Surgical History:   Procedure Laterality Date   • CARDIAC CATHETERIZATION N/A 1/25/2021    Procedure: LEFT HEART CATH;  Surgeon: Adelso Burnett IV, MD;  Location:  [facility];  Service: Cardiovascular;  Laterality: N/A;   • CARDIAC CATHETERIZATION N/A 1/25/2021    Procedure: Coronary angiography;  Surgeon: Adelso Burnett IV, MD;  Location:  [facility];  Service: Cardiovascular;  Laterality: N/A;   • COLONOSCOPY     • GASTRIC PH MONITORING 24HR N/A 9/1/2020    Procedure: GASTRIC PH MONITORING 24HR;  Surgeon: Cliff Sheffield MD;  Location:  [facility] ENDOSCOPY;  Service: General;  Laterality: N/A;  Probe successfully placed 25 at left nare   • UPPER GASTROINTESTINAL ENDOSCOPY         Family History: family history  includes Colon cancer in her brother; Colon polyps in her mother and sister; No Known Problems in her brother, brother, sister, sister, sister, and sister; Stroke in her sister. Otherwise pertinent FHx was reviewed and unremarkable.     Social History:  reports that she has never smoked. She has never used smokeless tobacco. She reports current alcohol use. She reports that she does not use drugs.  Social History     Social History Narrative   • Not on file       Medications:  DULoxetine HCl, Fremanezumab-vfrm, amitriptyline, dexlansoprazole, dilTIAZem XR, estradiol, fluticasone, levETIRAcetam, pravastatin, progesterone, promethazine, rizatriptan, tiZANidine, tiotropium bromide-olodaterol, and topiramate    No Known Allergies    Objective   Objective     Vital Signs:   Temp:  [97.6 °F (36.4 °C)-98.7 °F (37.1 °C)] 98.7 °F (37.1 °C)  Heart Rate:  [75-94] 75  Resp:  [18-19] 18  BP: ()/(53-86) 116/65    Physical Exam  Vitals and nursing note reviewed.   Constitutional:       Appearance: Normal appearance.   HENT:      Head: Normocephalic and atraumatic.      Right Ear: External ear normal.      Left Ear: External ear normal.   Cardiovascular:      Rate and Rhythm: Normal rate and regular rhythm.      Pulses: Normal pulses.   Pulmonary:      Effort: Pulmonary effort is normal.      Breath sounds: No wheezing or rhonchi.   Abdominal:      General: Abdomen is flat. There is no distension.      Tenderness: There is no abdominal tenderness.      Hernia: No hernia is present.   Neurological:      General: No focal deficit present.      Mental Status: She is alert and oriented to person, place, and time.      Motor: No weakness.      Comments: Intermittent slurred speech however no obvious aphasia or dysarthria. She has hoarseness of her voice. Cranial nerves II through XII intact without deficit.    5 out of 5 strength in bilateral upper and lower extremities. Normal sensory.       Psychiatric:         Mood and Affect:  Mood normal.            Results Reviewed:  I have personally reviewed most recent indicated data and agree with findings including:  [x]  Laboratory  [x]  Radiology  []  EKG/Telemetry  []  Pathology  [x]  Cardiac/Vascular Studies  [x]  Old records  []  Other:  Most pertinent findings include:      LAB RESULTS:      Lab 04/10/21  1343   WBC 5.63   HEMOGLOBIN 15.0   HEMATOCRIT 45.7   PLATELETS 272   NEUTROS ABS 3.23   IMMATURE GRANS (ABS) 0.01   LYMPHS ABS 1.88   MONOS ABS 0.39   EOS ABS 0.10   MCV 90.5   PROTIME 13.2   APTT 27.1         Lab 04/10/21  1343   SODIUM 138   POTASSIUM 4.0   CHLORIDE 107   CO2 20.0*   ANION GAP 11.0   BUN 11   CREATININE 1.06*   GLUCOSE 112*   CALCIUM 8.7         Lab 04/10/21  1343   TOTAL PROTEIN 7.7   ALBUMIN 4.70   GLOBULIN 3.0   ALT (SGPT) 20   AST (SGOT) 29   BILIRUBIN 0.4   ALK PHOS 84   LIPASE 16         Lab 04/10/21  1521 04/10/21  1343   PROBNP  --  26.0   TROPONIN T <0.010 <0.010   PROTIME  --  13.2   INR  --  1.03                 Brief Urine Lab Results  (Last result in the past 365 days)      Color   Clarity   Blood   Leuk Est   Nitrite   Protein   CREAT   Urine HCG        04/10/21 1348 Yellow Clear Negative Negative Negative Negative             Microbiology Results (last 10 days)     ** No results found for the last 240 hours. **          MRI Angiogram Head Without Contrast    Result Date: 4/10/2021  EXAMINATION: MRI ANGIOGRAM HEAD WO CONTRAST-, MRI BRAIN WO CONTRAST-, MRI ANGIOGRAM NECK W CONTRAST-  INDICATION: generalized numbness and AMS w/ expressive aphasia; R41.82-Altered mental status, unspecified; R20.0-Anesthesia of skin; R20.2-Paresthesia of skin; R47.01-Aphasia  TECHNIQUE: Multiplanar multisequence MRI of the brain performed without IV contrast. Noncontrast time-of-flight 3-dimensional MR angiogram of the head and neck with three-dimensional maximum intensity projections postprocessed. Additional postcontrast MR angiogram of the neck.  COMPARISON: NONE  FINDINGS:  MRI brain: No acute infarct noted on diffusion weighted sequences. Midline structures are unremarkable and the craniocervical junction is satisfactory in appearance. The ventricles are normal in size and configuration. There is no evidence of intracranial hemorrhage, mass or mass effect. The orbits are normal and the paranasal sinuses are grossly clear.  MR angiogram: Patent great vessel origins. On the right, no significant narrowing of the ICA origin, 0% by NASCET criteria. Similar 0% narrowing on the left. The vertebral arteries are normal in course and caliber bilaterally. Intracranially, no significant atherosclerotic change of the carotid siphons. The branching [place] vessels demonstrate no evidence of flow-limiting stenosis, large vessel occlusion or aneurysmal dilatation. The vertebrobasilar system is unremarkable.      Impression: Normal MRI of the brain with no evidence of ischemia, hemorrhage, mass or mass effect.  Normal MR angiogram of the head and neck with no evidence of flow-limiting stenosis, large vessel occlusion or aneurysmal dilatation.   This report was finalized on 4/10/2021 5:32 PM by Stevo Syed.      MRI Angiogram Neck With Contrast    Result Date: 4/10/2021  EXAMINATION: MRI ANGIOGRAM HEAD WO CONTRAST-, MRI BRAIN WO CONTRAST-, MRI ANGIOGRAM NECK W CONTRAST-  INDICATION: generalized numbness and AMS w/ expressive aphasia; R41.82-Altered mental status, unspecified; R20.0-Anesthesia of skin; R20.2-Paresthesia of skin; R47.01-Aphasia  TECHNIQUE: Multiplanar multisequence MRI of the brain performed without IV contrast. Noncontrast time-of-flight 3-dimensional MR angiogram of the head and neck with three-dimensional maximum intensity projections postprocessed. Additional postcontrast MR angiogram of the neck.  COMPARISON: NONE  FINDINGS: MRI brain: No acute infarct noted on diffusion weighted sequences. Midline structures are unremarkable and the craniocervical junction is  satisfactory in appearance. The ventricles are normal in size and configuration. There is no evidence of intracranial hemorrhage, mass or mass effect. The orbits are normal and the paranasal sinuses are grossly clear.  MR angiogram: Patent great vessel origins. On the right, no significant narrowing of the ICA origin, 0% by NASCET criteria. Similar 0% narrowing on the left. The vertebral arteries are normal in course and caliber bilaterally. Intracranially, no significant atherosclerotic change of the carotid siphons. The branching [place] of [place] vessels demonstrate no evidence of flow-limiting stenosis, large vessel occlusion or aneurysmal dilatation. The vertebrobasilar system is unremarkable.      Impression: Normal MRI of the brain with no evidence of ischemia, hemorrhage, mass or mass effect.  Normal MR angiogram of the head and neck with no evidence of flow-limiting stenosis, large vessel occlusion or aneurysmal dilatation.   This report was finalized on 4/10/2021 5:32 PM by Stevo Syed.      MRI Brain Without Contrast    Result Date: 4/10/2021  EXAMINATION: MRI ANGIOGRAM HEAD WO CONTRAST-, MRI BRAIN WO CONTRAST-, MRI ANGIOGRAM NECK W CONTRAST-  INDICATION: generalized numbness and AMS w/ expressive aphasia; R41.82-Altered mental status, unspecified; R20.0-Anesthesia of skin; R20.2-Paresthesia of skin; R47.01-Aphasia  TECHNIQUE: Multiplanar multisequence MRI of the brain performed without IV contrast. Noncontrast time-of-flight 3-dimensional MR angiogram of the head and neck with three-dimensional maximum intensity projections postprocessed. Additional postcontrast MR angiogram of the neck.  COMPARISON: NONE  FINDINGS: MRI brain: No acute infarct noted on diffusion weighted sequences. Midline structures are unremarkable and the craniocervical junction is satisfactory in appearance. The ventricles are normal in size and configuration. There is no evidence of intracranial hemorrhage, mass or mass effect.  The orbits are normal and the paranasal sinuses are grossly clear.  MR angiogram: Patent great vessel origins. On the right, no significant narrowing of the ICA origin, 0% by NASCET criteria. Similar 0% narrowing on the left. The vertebral arteries are normal in course and caliber bilaterally. Intracranially, no significant atherosclerotic change of the carotid siphons. The branching [place] of [place] vessels demonstrate no evidence of flow-limiting stenosis, large vessel occlusion or aneurysmal dilatation. The vertebrobasilar system is unremarkable.      Impression: Normal MRI of the brain with no evidence of ischemia, hemorrhage, mass or mass effect.  Normal MR angiogram of the head and neck with no evidence of flow-limiting stenosis, large vessel occlusion or aneurysmal dilatation.   This report was finalized on 4/10/2021 5:32 PM by Stevo Syed.      XR Chest 1 View    Result Date: 4/10/2021  EXAMINATION: XR CHEST 1 VW-  INDICATION: Stroke Protocol (Onset > 12 Hrs)  COMPARISON: 6/4/2020  FINDINGS: No focal airspace opacity. No pleural effusion or pneumothorax. Normal heart and mediastinal contours.      Impression: No evidence of acute disease in the chest.  This report was finalized on 4/10/2021 3:10 PM by Stevo Syed.      CT Head Without Contrast Stroke Protocol    Result Date: 4/10/2021  EXAMINATION: CT HEAD WO CONTRAST STROKE PROTOCOL-  INDICATION: Stroke, follow up  TECHNIQUE: Axial noncontrast CT of the head with multiplanar reconstruction  The radiation dose reduction device was turned on for each scan per the ALARA (As Low as Reasonably Achievable) protocol.  COMPARISON: NONE  FINDINGS: Gray-white differentiation is maintained and there is no evidence of intracranial hemorrhage, mass or mass effect. Age-related changes of the brain are present including volume loss and typical periventricular sequela of chronic small vessel ischemia. There is otherwise no evidence of intracranial hemorrhage,  mass or mass effect. The ventricles are normal in size and configuration accounting for surrounding volume loss. The orbits are normal and the paranasal sinuses are grossly clear.      Impression: Age-related changes of the brain as above, otherwise without evidence of acute intracranial abnormality.  Scan performed 1:06 PM 4/10/2021, results related to the stroke team via the CT technologist by Stevo Syed at 1:16 PM same day   This report was finalized on 4/10/2021 1:19 PM by Stevo Syed.        Results for orders placed during the hospital encounter of 06/04/20    Adult Transthoracic Echo Complete W/ Cont if Necessary Per Protocol    Interpretation Summary  · Estimated EF appears to be in the range of 61 - 65%.  · Left ventricular systolic function is normal.  · Left ventricular diastolic dysfunction (grade I) consistent with impaired relaxation.  · Saline test results are positive for right to left atrial level shunt.  · No significant structural or functional valvular disease.      Assessment/Plan   Assessment & Plan       Hoarse voice quality    Acute alteration in mental status    Numbness    Dizziness    Difficulty speaking    DIXON (acute kidney injury) (CMS/Formerly [facility])        Delphine Das is a 60 y.o. female with past medical history significant for migraines, laryngitis, hyperlipidemia, asthma, hiatal hernia, GERD, patent foramen ovale who presents to [facility] secondary to multiple complaints. The patient was eating at [employer] with her daughter this evening. The patient had a sudden onset of left-sided numbness and difficulty speaking which then traveled to numbness on the right side. Symptoms were mostly resolved upon presentation to the ER. Laboratory evaluation was negative. CT and MRI of the brain were negative.    Numbness (left side, then right side)  Dysarthria  -CT head without contrast negative.  -MRI head and neck angiogram: No evidence of acute  disease  -Echocardiogram pending  -Hemoglobin A1c in the morning  -Lipid panel in the morning  -SLP in the morning as patient reports difficulty swallowing  -neurology consult    History of anxiety and depression  -Patient reportedly takes Cymbalta 90 mg daily and Elavil 50 mg  -It is possible her symptoms were related to an anxiety attack.    DIXON  -IVFs  -a.m. labs  -monitor for nephrotoxic meds    Low normal blood pressure  -Orthostatic vital signs  -Telemetry  -Hold Diltiazem 120mg     History of patent foramen ovale  -Echocardiogram pending  -Cardiac enzymes and BNP negative.    History of migraines  -cont home medications: Keppra 500mg BID, Topamax 50mg BID  -hold Maxalt and zanaflex     DVT prophylaxis:  Mechanical     CODE STATUS:    Code Status and Medical Interventions:   Ordered at: 04/10/21 1814     Limited Support to NOT Include:    Intubation     Level Of Support Discussed With:    Patient    Next of Kin (If No Surrogate)     Code Status:    No CPR     Medical Interventions (Level of Support Prior to Arrest):    Limited       This note has been completed as part of a split-shared workflow.     Signature: Electronically signed by Maryanne Virgen PA-C, 04/10/21, 6:40 PM EDT          Attending   Admission Attestation       I have seen and examined the patient, performing an independent face-to-face diagnostic evaluation with plan of care reviewed and developed with the advanced practice clinician (APC).      Brief Summary Statement:   Delphine Das is a 60 y.o. female With a PMH significant for migraines, anxiety, asthma, GERD, patent mil[place] ovale who presents to the ED due to numbness and weakness.  Patient states that she woke up with a headache.  This evening while eating dinner she began to experience lightheaded sensation with difficulty speaking followed by left lower extremity numbness tingling.  Shortly thereafter she began to experience left upper extremity followed by right lower extremity  followed by upper extremity numbness and tingling in inability to move.  She reports associated shortness of breath and sensation that she was dying.  She states that the symptoms lasted around 20 minutes.  She denies prior similar symptoms.  Remainder of detailed HPI is as noted by APC and has been reviewed and/or edited by me for completeness.    Attending Physical Exam:  Constitutional: Awake, alert  Eyes: PERRLA, sclerae anicteric, no conjunctival injection  HENT: NCAT, mucous membranes moist  Neck: Supple, no thyromegaly, no lymphadenopathy, trachea midline  Respiratory: Clear to auscultation bilaterally, nonlabored respirations   Cardiovascular: RRR, no murmurs, rubs, or gallops, palpable pedal pulses bilaterally  Gastrointestinal: Positive bowel sounds, soft, nontender, nondistended  Musculoskeletal: No bilateral ankle edema, no clubbing or cyanosis to extremities  Psychiatric: Appropriate affect, cooperative  Neurologic: Oriented x 3, strength symmetric in all extremities, Cranial Nerves grossly intact to confrontation, speech slow  Skin: No rashes      Brief Assessment/Plan :  See detailed assessment and plan developed with APC which I have reviewed and/or edited for completeness.        Admission Status: I believe that this patient meets OBSERVATION status, however if further evaluation or treatment plans warrant, status may change.  Based upon current information, I predict patient's care encounter to be less than or equal to 2 midnights.      Adrienne Valera,   04/10/21

## 2021-04-11 ENCOUNTER — APPOINTMENT (OUTPATIENT)
Dept: CARDIOLOGY | Facility: HOSPITAL | Age: 61
End: 2021-04-11

## 2021-04-11 LAB
ALBUMIN SERPL-MCNC: 3.9 G/DL (ref 3.5–5.2)
ALBUMIN/GLOB SERPL: 1.6 G/DL
ALP SERPL-CCNC: 68 U/L (ref 39–117)
ALT SERPL W P-5'-P-CCNC: 18 U/L (ref 1–33)
ANION GAP SERPL CALCULATED.3IONS-SCNC: 9 MMOL/L (ref 5–15)
ASCENDING AORTA: 2.7 CM
AST SERPL-CCNC: 23 U/L (ref 1–32)
BASOPHILS # BLD AUTO: 0.02 10*3/MM3 (ref 0–0.2)
BASOPHILS NFR BLD AUTO: 0.4 % (ref 0–1.5)
BH CV ECHO MEAS - AO MAX PG (FULL): 3.3 MMHG
BH CV ECHO MEAS - AO MAX PG: 11.2 MMHG
BH CV ECHO MEAS - AO MEAN PG (FULL): 2 MMHG
BH CV ECHO MEAS - AO MEAN PG: 6 MMHG
BH CV ECHO MEAS - AO ROOT AREA (BSA CORRECTED): 1.4
BH CV ECHO MEAS - AO ROOT AREA: 5.3 CM^2
BH CV ECHO MEAS - AO ROOT DIAM: 2.6 CM
BH CV ECHO MEAS - AO V2 MAX: 167 CM/SEC
BH CV ECHO MEAS - AO V2 MEAN: 109 CM/SEC
BH CV ECHO MEAS - AO V2 VTI: 29.8 CM
BH CV ECHO MEAS - ASC AORTA: 2.7 CM
BH CV ECHO MEAS - AVA(I,A): 2.7 CM^2
BH CV ECHO MEAS - AVA(I,D): 2.7 CM^2
BH CV ECHO MEAS - AVA(V,A): 2.4 CM^2
BH CV ECHO MEAS - AVA(V,D): 2.4 CM^2
BH CV ECHO MEAS - BSA(HAYCOCK): 2 M^2
BH CV ECHO MEAS - BSA: 1.9 M^2
BH CV ECHO MEAS - BZI_BMI: 34.8 KILOGRAMS/M^2
BH CV ECHO MEAS - BZI_METRIC_HEIGHT: 157.5 CM
BH CV ECHO MEAS - BZI_METRIC_WEIGHT: 86.2 KG
BH CV ECHO MEAS - EDV(CUBED): 51.5 ML
BH CV ECHO MEAS - EDV(MOD-SP2): 38 ML
BH CV ECHO MEAS - EDV(MOD-SP4): 49 ML
BH CV ECHO MEAS - EDV(TEICH): 58.9 ML
BH CV ECHO MEAS - EF(CUBED): 72.5 %
BH CV ECHO MEAS - EF(MOD-BP): 61 %
BH CV ECHO MEAS - EF(MOD-SP2): 55.3 %
BH CV ECHO MEAS - EF(MOD-SP4): 63.3 %
BH CV ECHO MEAS - EF(TEICH): 65 %
BH CV ECHO MEAS - ESV(CUBED): 14.2 ML
BH CV ECHO MEAS - ESV(MOD-SP2): 17 ML
BH CV ECHO MEAS - ESV(MOD-SP4): 18 ML
BH CV ECHO MEAS - ESV(TEICH): 20.6 ML
BH CV ECHO MEAS - FS: 34.9 %
BH CV ECHO MEAS - IVS/LVPW: 1
BH CV ECHO MEAS - IVSD: 0.94 CM
BH CV ECHO MEAS - LA DIMENSION: 3.4 CM
BH CV ECHO MEAS - LA/AO: 1.3
BH CV ECHO MEAS - LAD MAJOR: 5.1 CM
BH CV ECHO MEAS - LAT PEAK E' VEL: 11.6 CM/SEC
BH CV ECHO MEAS - LATERAL E/E' RATIO: 6.6
BH CV ECHO MEAS - LV DIASTOLIC VOL/BSA (35-75): 26.2 ML/M^2
BH CV ECHO MEAS - LV IVRT: 0.09 SEC
BH CV ECHO MEAS - LV MASS(C)D: 101.2 GRAMS
BH CV ECHO MEAS - LV MASS(C)DI: 54.1 GRAMS/M^2
BH CV ECHO MEAS - LV MAX PG: 7.8 MMHG
BH CV ECHO MEAS - LV MEAN PG: 4 MMHG
BH CV ECHO MEAS - LV SYSTOLIC VOL/BSA (12-30): 9.6 ML/M^2
BH CV ECHO MEAS - LV V1 MAX: 140 CM/SEC
BH CV ECHO MEAS - LV V1 MEAN: 93.3 CM/SEC
BH CV ECHO MEAS - LV V1 VTI: 28.7 CM
BH CV ECHO MEAS - LVIDD: 3.7 CM
BH CV ECHO MEAS - LVIDS: 2.4 CM
BH CV ECHO MEAS - LVLD AP2: 6.6 CM
BH CV ECHO MEAS - LVLD AP4: 6.8 CM
BH CV ECHO MEAS - LVLS AP2: 6 CM
BH CV ECHO MEAS - LVLS AP4: 6.2 CM
BH CV ECHO MEAS - LVOT AREA (M): 2.8 CM^2
BH CV ECHO MEAS - LVOT AREA: 2.8 CM^2
BH CV ECHO MEAS - LVOT DIAM: 1.9 CM
BH CV ECHO MEAS - LVPWD: 0.9 CM
BH CV ECHO MEAS - MED PEAK E' VEL: 6.4 CM/SEC
BH CV ECHO MEAS - MEDIAL E/E' RATIO: 12
BH CV ECHO MEAS - MV A MAX VEL: 79.5 CM/SEC
BH CV ECHO MEAS - MV DEC SLOPE: 444 CM/SEC^2
BH CV ECHO MEAS - MV DEC TIME: 0.25 SEC
BH CV ECHO MEAS - MV E MAX VEL: 76.5 CM/SEC
BH CV ECHO MEAS - MV E/A: 0.96
BH CV ECHO MEAS - MV P1/2T MAX VEL: 99.1 CM/SEC
BH CV ECHO MEAS - MV P1/2T: 65.4 MSEC
BH CV ECHO MEAS - MVA P1/2T LCG: 2.2 CM^2
BH CV ECHO MEAS - MVA(P1/2T): 3.4 CM^2
BH CV ECHO MEAS - PA ACC SLOPE: 824 CM/SEC^2
BH CV ECHO MEAS - PA ACC TIME: 0.1 SEC
BH CV ECHO MEAS - PA PR(ACCEL): 35.4 MMHG
BH CV ECHO MEAS - SI(AO): 84.6 ML/M^2
BH CV ECHO MEAS - SI(CUBED): 19.9 ML/M^2
BH CV ECHO MEAS - SI(LVOT): 43.5 ML/M^2
BH CV ECHO MEAS - SI(MOD-SP2): 11.2 ML/M^2
BH CV ECHO MEAS - SI(MOD-SP4): 16.6 ML/M^2
BH CV ECHO MEAS - SI(TEICH): 20.5 ML/M^2
BH CV ECHO MEAS - SV(AO): 158.2 ML
BH CV ECHO MEAS - SV(CUBED): 37.3 ML
BH CV ECHO MEAS - SV(LVOT): 81.4 ML
BH CV ECHO MEAS - SV(MOD-SP2): 21 ML
BH CV ECHO MEAS - SV(MOD-SP4): 31 ML
BH CV ECHO MEAS - SV(TEICH): 38.3 ML
BH CV ECHO MEAS - TAPSE (>1.6): 1.6 CM
BH CV ECHO MEAS - TR MAX PG: 18 MMHG
BH CV ECHO MEAS - TR MAX VEL: 212 CM/SEC
BH CV ECHO MEASUREMENTS AVERAGE E/E' RATIO: 8.5
BH CV VAS BP RIGHT ARM: NORMAL MMHG
BH CV XLRA - RV BASE: 2.3 CM
BH CV XLRA - RV LENGTH: 5.5 CM
BH CV XLRA - RV MID: 2 CM
BH CV XLRA - TDI S': 9.7 CM/SEC
BILIRUB SERPL-MCNC: 0.3 MG/DL (ref 0–1.2)
BUN SERPL-MCNC: 9 MG/DL (ref 6–20)
BUN/CREAT SERPL: 9.6 (ref 7–25)
CALCIUM SPEC-SCNC: 8.9 MG/DL (ref 8.6–10.5)
CHLORIDE SERPL-SCNC: 115 MMOL/L (ref 98–107)
CHOLEST SERPL-MCNC: 145 MG/DL (ref 0–200)
CO2 SERPL-SCNC: 18 MMOL/L (ref 22–29)
CREAT SERPL-MCNC: 0.94 MG/DL (ref 0.57–1)
DEPRECATED RDW RBC AUTO: 46.4 FL (ref 37–54)
EOSINOPHIL # BLD AUTO: 0.13 10*3/MM3 (ref 0–0.4)
EOSINOPHIL NFR BLD AUTO: 2.5 % (ref 0.3–6.2)
ERYTHROCYTE [DISTWIDTH] IN BLOOD BY AUTOMATED COUNT: 13.8 % (ref 12.3–15.4)
GFR SERPL CREATININE-BSD FRML MDRD: 61 ML/MIN/1.73
GLOBULIN UR ELPH-MCNC: 2.5 GM/DL
GLUCOSE SERPL-MCNC: 94 MG/DL (ref 65–99)
HBA1C MFR BLD: 5.6 % (ref 4.8–5.6)
HCT VFR BLD AUTO: 41 % (ref 34–46.6)
HDLC SERPL-MCNC: 63 MG/DL (ref 40–60)
HGB BLD-MCNC: 13.1 G/DL (ref 12–15.9)
IMM GRANULOCYTES # BLD AUTO: 0.01 10*3/MM3 (ref 0–0.05)
IMM GRANULOCYTES NFR BLD AUTO: 0.2 % (ref 0–0.5)
LDLC SERPL CALC-MCNC: 63 MG/DL (ref 0–100)
LDLC/HDLC SERPL: 0.97 {RATIO}
LEFT ATRIUM VOLUME INDEX: 23 ML/M^2
LEFT ATRIUM VOLUME: 43 ML
LYMPHOCYTES # BLD AUTO: 1.37 10*3/MM3 (ref 0.7–3.1)
LYMPHOCYTES NFR BLD AUTO: 26.8 % (ref 19.6–45.3)
MAXIMAL PREDICTED HEART RATE: 161 BPM
MCH RBC QN AUTO: 29.3 PG (ref 26.6–33)
MCHC RBC AUTO-ENTMCNC: 32 G/DL (ref 31.5–35.7)
MCV RBC AUTO: 91.7 FL (ref 79–97)
MONOCYTES # BLD AUTO: 0.39 10*3/MM3 (ref 0.1–0.9)
MONOCYTES NFR BLD AUTO: 7.6 % (ref 5–12)
NEUTROPHILS NFR BLD AUTO: 3.19 10*3/MM3 (ref 1.7–7)
NEUTROPHILS NFR BLD AUTO: 62.5 % (ref 42.7–76)
NRBC BLD AUTO-RTO: 0 /100 WBC (ref 0–0.2)
PLATELET # BLD AUTO: 294 10*3/MM3 (ref 140–450)
PMV BLD AUTO: 10.6 FL (ref 6–12)
POTASSIUM SERPL-SCNC: 3.6 MMOL/L (ref 3.5–5.2)
PROT SERPL-MCNC: 6.4 G/DL (ref 6–8.5)
QT INTERVAL: 398 MS
QTC INTERVAL: 470 MS
RBC # BLD AUTO: 4.47 10*6/MM3 (ref 3.77–5.28)
SARS-COV-2 RNA RESP QL NAA+PROBE: NOT DETECTED
SODIUM SERPL-SCNC: 142 MMOL/L (ref 136–145)
STRESS TARGET HR: 137 BPM
TRIGL SERPL-MCNC: 105 MG/DL (ref 0–150)
VLDLC SERPL-MCNC: 19 MG/DL (ref 5–40)
WBC # BLD AUTO: 5.11 10*3/MM3 (ref 3.4–10.8)

## 2021-04-11 PROCEDURE — 80061 LIPID PANEL: CPT | Performed by: PHYSICIAN ASSISTANT

## 2021-04-11 PROCEDURE — 83036 HEMOGLOBIN GLYCOSYLATED A1C: CPT | Performed by: PHYSICIAN ASSISTANT

## 2021-04-11 PROCEDURE — U0005 INFEC AGEN DETEC AMPLI PROBE: HCPCS | Performed by: INTERNAL MEDICINE

## 2021-04-11 PROCEDURE — U0003 INFECTIOUS AGENT DETECTION BY NUCLEIC ACID (DNA OR RNA); SEVERE ACUTE RESPIRATORY SYNDROME CORONAVIRUS 2 (SARS-COV-2) (CORONAVIRUS DISEASE [COVID-19]), AMPLIFIED PROBE TECHNIQUE, MAKING USE OF HIGH THROUGHPUT TECHNOLOGIES AS DESCRIBED BY CMS-2020-01-R: HCPCS | Performed by: INTERNAL MEDICINE

## 2021-04-11 PROCEDURE — 80053 COMPREHEN METABOLIC PANEL: CPT | Performed by: PHYSICIAN ASSISTANT

## 2021-04-11 PROCEDURE — 85025 COMPLETE CBC W/AUTO DIFF WBC: CPT | Performed by: PHYSICIAN ASSISTANT

## 2021-04-11 PROCEDURE — 99204 OFFICE O/P NEW MOD 45 MIN: CPT | Performed by: PSYCHIATRY & NEUROLOGY

## 2021-04-11 PROCEDURE — G0378 HOSPITAL OBSERVATION PER HR: HCPCS

## 2021-04-11 PROCEDURE — 92610 EVALUATE SWALLOWING FUNCTION: CPT | Performed by: SPEECH-LANGUAGE PATHOLOGIST

## 2021-04-11 PROCEDURE — 93306 TTE W/DOPPLER COMPLETE: CPT | Performed by: INTERNAL MEDICINE

## 2021-04-11 PROCEDURE — 96361 HYDRATE IV INFUSION ADD-ON: CPT

## 2021-04-11 PROCEDURE — 93306 TTE W/DOPPLER COMPLETE: CPT

## 2021-04-11 PROCEDURE — 97161 PT EVAL LOW COMPLEX 20 MIN: CPT

## 2021-04-11 PROCEDURE — 97116 GAIT TRAINING THERAPY: CPT

## 2021-04-11 PROCEDURE — 25010000002 KETOROLAC TROMETHAMINE PER 15 MG: Performed by: PSYCHIATRY & NEUROLOGY

## 2021-04-11 PROCEDURE — 96375 TX/PRO/DX INJ NEW DRUG ADDON: CPT

## 2021-04-11 PROCEDURE — 99225 PR SBSQ OBSERVATION CARE/DAY 25 MINUTES: CPT | Performed by: INTERNAL MEDICINE

## 2021-04-11 RX ORDER — KETOROLAC TROMETHAMINE 30 MG/ML
30 INJECTION, SOLUTION INTRAMUSCULAR; INTRAVENOUS ONCE
Status: COMPLETED | OUTPATIENT
Start: 2021-04-11 | End: 2021-04-11

## 2021-04-11 RX ORDER — SODIUM CHLORIDE 9 MG/ML
75 INJECTION, SOLUTION INTRAVENOUS CONTINUOUS
Status: ACTIVE | OUTPATIENT
Start: 2021-04-11 | End: 2021-04-12

## 2021-04-11 RX ADMIN — LEVETIRACETAM 500 MG: 500 TABLET ORAL at 21:07

## 2021-04-11 RX ADMIN — ACETAMINOPHEN 650 MG: 325 TABLET ORAL at 15:10

## 2021-04-11 RX ADMIN — PANTOPRAZOLE SODIUM 40 MG: 40 TABLET, DELAYED RELEASE ORAL at 18:07

## 2021-04-11 RX ADMIN — KETOROLAC TROMETHAMINE 30 MG: 30 INJECTION, SOLUTION INTRAMUSCULAR at 16:01

## 2021-04-11 RX ADMIN — AMITRIPTYLINE HYDROCHLORIDE 50 MG: 50 TABLET, FILM COATED ORAL at 21:07

## 2021-04-11 RX ADMIN — SODIUM CHLORIDE 75 ML/HR: 9 INJECTION, SOLUTION INTRAVENOUS at 16:01

## 2021-04-11 RX ADMIN — ACETAMINOPHEN 650 MG: 325 TABLET ORAL at 21:06

## 2021-04-11 RX ADMIN — ACETAMINOPHEN 650 MG: 325 TABLET ORAL at 00:05

## 2021-04-11 RX ADMIN — TOPIRAMATE 50 MG: 25 TABLET, FILM COATED ORAL at 10:10

## 2021-04-11 RX ADMIN — TOPIRAMATE 50 MG: 25 TABLET, FILM COATED ORAL at 21:06

## 2021-04-11 RX ADMIN — SODIUM CHLORIDE, PRESERVATIVE FREE 10 ML: 5 INJECTION INTRAVENOUS at 21:07

## 2021-04-11 RX ADMIN — LEVETIRACETAM 500 MG: 500 TABLET ORAL at 10:10

## 2021-04-11 RX ADMIN — PANTOPRAZOLE SODIUM 40 MG: 40 TABLET, DELAYED RELEASE ORAL at 06:47

## 2021-04-11 RX ADMIN — PRAVASTATIN SODIUM 40 MG: 40 TABLET ORAL at 21:07

## 2021-04-11 RX ADMIN — DULOXETINE HYDROCHLORIDE 90 MG: 60 CAPSULE, DELAYED RELEASE ORAL at 10:10

## 2021-04-11 RX ADMIN — SODIUM CHLORIDE, PRESERVATIVE FREE 10 ML: 5 INJECTION INTRAVENOUS at 10:10

## 2021-04-11 NOTE — PLAN OF CARE
Goal Outcome Evaluation:  Plan of Care Reviewed With: patient  Progress:  (initial eval)   SLP evaluation completed. Will continue to address dysphagia w/ plan for MBS w/ limited UGI and s/l/cognitive evaluation. Please see note for further details and recommendations.

## 2021-04-11 NOTE — CONSULTS
Referring Provider: Dr Corea  Reason for Consultation: Left-sided numbness, speech difficulty    Patient Care Team:  Timur Virgen MD as PCP - General (Family Medicine)  Timur Virgen MD    Chief complaint left-sided numbness, confusion    Subjective .     History of present illness: 60-yo RH woman with PMH notable for migraine, HTN, HLD and asthma admitted yesterday after she presented to the ED via EMS.  She was at a restaurant with her daughter when she developed numbness of her left foot that spread up her left side then in her entire body from the neck down.  She felt generally weak, short of breath and confused, and felt it was difficult to express herself.  Her daughter notes that she was holding her left arm away from her body quite stiffly and resisted any movement of the arm.  EMS apparently found it difficult to get her out of her seat.  On arrival she was noted to be mildly somnolent with a hoarse voice and her numbness had improved.  In the ED vital signs were stable, systolic pressure in the 90s.  Laboratory work-up was unremarkable and brain MRI and MRA of head and neck were unremarkable.  She was admitted to the hospitalist service for observation.  Patient notes she continues to feel tired.  She reports she had a headache when the symptoms began yesterday.  She denies previous unilateral numbness or weakness with migraines.  She follows at the [facility] headache clinic.  She denies recent fever or chills, chest pain, cough, shortness of breath, nausea or vomiting or diarrhea.  No new vision changes.    Review of Systems  Pertinent items are noted in HPI, all other systems reviewed and negative     History  Past Medical History:   Diagnosis Date   • Asthma    • Colon polyp    • GERD (gastroesophageal reflux disease)    • Hiatal hernia    • Hyperlipidemia    • Hypertension    • Migraine    • PFO (patent foramen ovale)    ,   Past Surgical History:   Procedure Laterality Date   • CARDIAC  CATHETERIZATION N/A 1/25/2021    Procedure: LEFT HEART CATH;  Surgeon: Adelso Burnett IV, MD;  Location:  [facility];  Service: Cardiovascular;  Laterality: N/A;   • CARDIAC CATHETERIZATION N/A 1/25/2021    Procedure: Coronary angiography;  Surgeon: Adelso Burnett IV, MD;  Location:  [facility];  Service: Cardiovascular;  Laterality: N/A;   • COLONOSCOPY     • GASTRIC PH MONITORING 24HR N/A 9/1/2020    Procedure: GASTRIC PH MONITORING 24HR;  Surgeon: Cliff Sheffield MD;  Location:  [facility];  Service: General;  Laterality: N/A;  Probe successfully placed 25 at left nare   • UPPER GASTROINTESTINAL ENDOSCOPY     ,   Family History   Problem Relation Age of Onset   • Colon polyps Mother    • Colon polyps Sister    • Colon cancer Brother    • No Known Problems Brother    • No Known Problems Brother    • Stroke Sister    • No Known Problems Sister    • No Known Problems Sister    • No Known Problems Sister    • No Known Problems Sister    ,   Social History     Socioeconomic History   • Marital status:      Spouse name: Not on file   • Number of children: Not on file   • Years of education: Not on file   • Highest education level: Not on file   Tobacco Use   • Smoking status: Never Smoker   • Smokeless tobacco: Never Used   Vaping Use   • Vaping Use: Never used   Substance and Sexual Activity   • Alcohol use: Yes     Comment: 3-4 drinks a week   • Drug use: No   • Sexual activity: Defer     E-cigarette/Vaping   • E-cigarette/Vaping Use Never User    • Passive Exposure No    • Counseling Given No      E-cigarette/Vaping Substances   • Nicotine No    • THC No    • CBD No    • Flavoring No      E-cigarette/Vaping Devices   • Disposable No    • Pre-filled or Refillable Cartridge No    • Refillable Tank No    • Pre-filled Pod No        ,   Medications Prior to Admission   Medication Sig Dispense Refill Last Dose   • AJOVY 225 MG/1.5ML solution  prefilled syringe INJECT 1.5 ML AS DIRECTED SUBCUTANEOUSLY ONCE A MONTH  3    • amitriptyline (ELAVIL) 50 MG tablet Take 50 mg by mouth every night at bedtime.  3    • dexlansoprazole (DEXILANT) 60 MG capsule Take 1 capsule by mouth daily 30 capsule 11    • dilTIAZem XR (DILACOR XR) 120 MG 24 hr capsule Take 1 capsule by mouth Daily. 30 capsule 11    • DULOXETINE HCL PO Take 90 mg by mouth Daily.      • estradiol (MINIVELLE, VIVELLE-DOT) 0.05 MG/24HR patch APPLY 1 PATCH TWICE WEEKLY TO DRY SKIN (EXAMPLE: MON/THURS)  12    • fluticasone (FLONASE) 50 MCG/ACT nasal spray into the nostril(s) as directed by provider As Needed.      • levETIRAcetam (KEPPRA) 500 MG tablet Take 500 mg by mouth 2 (Two) Times a Day.      • pravastatin (PRAVACHOL) 40 MG tablet Take 40 mg by mouth every night at bedtime.  11    • progesterone (PROMETRIUM) 100 MG capsule Take  by mouth Daily.      • promethazine (PHENERGAN) 25 MG tablet TAKE ONE TABLET BY MOUTH THREE TIMES A DAY AS NEEDED FOR HEADACHE NAUSEA AND VOMITING  0    • rizatriptan (MAXALT) 10 MG tablet Take 10 mg by mouth 1 (One) Time As Needed for Migraine. May repeat in 2 hours if needed      • Stiolto Respimat 2.5-2.5 MCG/ACT aerosol solution inhaler 2 puffs As Needed.      • tiZANidine (ZANAFLEX) 4 MG tablet TAKE 1/2 TO 1 TABLET BY MOUTH TWO TIMES A DAY AS NEEDED FOR HEADACHE  3    • topiramate (TOPAMAX) 50 MG tablet Take 1 tablet by mouth 2 (two) times a day.      , Scheduled Meds:  amitriptyline, 50 mg, Oral, Nightly  DULoxetine, 90 mg, Oral, Daily  levETIRAcetam, 500 mg, Oral, BID  pantoprazole, 40 mg, Oral, BID AC  pravastatin, 40 mg, Oral, Nightly  sodium chloride, 10 mL, Intravenous, Q12H  topiramate, 50 mg, Oral, Q12H    , Continuous Infusions:  sodium chloride, 75 mL/hr    , PRN Meds:  •  acetaminophen  •  sodium chloride  •  sodium chloride and Allergies:  Patient has no known allergies.    Objective     Vital Signs   Blood pressure 100/56, pulse 79, temperature 97.2 °F  "(36.2 °C), temperature source Oral, resp. rate 18, height 177 cm (69.69\"), weight 86.2 kg (190 lb), SpO2 99 %.    Physical Exam:   Well-developed middle-aged white woman lying quietly in the hospital bed in no acute distress.  Alert and fully oriented with normal language, attention, memory and fund of knowledge.  Speech is initially hesitant but then fluent with further conversation.  No dysarthria or dysphonia.  Pupils 4 mm and reactive, VF F, EOMI, normal facial sensation and movement.  Hearing intact to voice, palate and shoulders elevates symmetrically and tongue protrudes midline  Motor: 5/5 throughout with no pronator drift  Muscle tone normal  Coordination intact in upper and lower extremities including finger-nose-finger and heel-knee-shin, although the latter performed slowly, but with good control bilaterally.  No abnormal movement  Reflexes 1-2+ and symmetric, withdraws from plantar stim  Sensory exam shows intact light touch and vibration intact in the toes bilaterally  Neck is supple, no carotid bruit appreciated  Heart RRR no murmur appreciated  Lungs clear to auscultation, normal respiratory effort  Abdomen soft, NT, ND with positive bowel sounds  Extremities without cyanosis or edema  Skin warm dry, no rashes appreciated  Psych: Affect flat, no abnormal thought content, pleasant and cooperative with exam.      Results Review:   I reviewed the patient's new clinical results.  I reviewed the patient's new imaging results and agree with the interpretation.  I reviewed the patient's other test results and agree with the interpretation   Brain MRI and mra of head and neck images reviewed and agree unremarkable  Labs reviewed  CBC normal  CMP with chloride 115 CO2 18 otherwise normal  Fasting lipids unremarkable  UA negative  A1c 5.6  COVID-19 not detected  Transthoracic echo showed normal LV systolic and diastolic function.  Saline test positive consistent with known PFO.    Assessment/Plan       Hoarse " voice quality    Acute alteration in mental status    Numbness    Dizziness    Difficulty speaking    DIXON (acute kidney injury) (CMS/HCC)      60-year-old woman with episode of initially left-sided numbness and then whole body numbness with speech difficulty and left arm stiffness/resistance to movement in the setting of headache.  Some of the symptoms including the initial left-sided numbness, speech hesitancy may well be migraine related but discussed with patient and her daughter that others would be less typical of migraine, namely the left arm symptoms (which are less likely physiologic).  Discussed work-up reassuring to date including echo, MRI and MRA.  No evidence of stroke or brain ischemia.  Recommend discharge home when medically ready.  Recommend follow-up with her migraine physician.    I discussed the patient's findings and my recommendations with patient, family and primary care team    Acacia Thacker MD  04/11/21  13:02 EDT

## 2021-04-11 NOTE — PLAN OF CARE
Goal Outcome Evaluation:  Plan of Care Reviewed With: patient, spouse, daughter (DTR IS A NURSE AT [facility].)     Outcome Summary: PT PRESENTS WITH EVOLVING SYMPTOMS TO INCLUDE IMPAIRED BALANCE, IMPAIRED COORDINATION, MILD EXPRESSIVE APHASIA, ANXIETY, AND DECLINE IN FUNCTIONAL MOBILITY. PT AMBULATED 200 FEET WITH L UE SUPPORT DEMONSTRATING WIDE MICKI AND SHORTENED STEP LENGTH. RECOMMEND OPPT AT D/C.

## 2021-04-11 NOTE — THERAPY EVALUATION
Patient Name: Delphine Das  : 1960    MRN: [id number]                              Today's Date: 2021       Admit Date: 4/10/2021    Visit Dx:     ICD-10-CM ICD-9-CM   1. Acute alteration in mental status  R41.82 780.97   2. Numbness and tingling of upper and lower extremities of both sides  R20.0 782.0    R20.2    3. Expressive aphasia  R47.01 784.3     Patient Active Problem List   Diagnosis   • Chest pain with normal angiography   • Dyspnea on exertion   • Laryngitis   • PFO (patent foramen ovale) : Suspected with atrial level shunt on bubble study echo 2020.   • Migraines   • Vocal cord dysfunction - suspected.    • Hiatal hernia   • GERD (gastroesophageal reflux disease)   • Hoarse voice quality   • Gastroesophageal reflux disease   • Mixed hyperlipidemia   • Acute alteration in mental status   • Numbness   • Dizziness   • Difficulty speaking   • DIXON (acute kidney injury) (CMS/HCC)     Past Medical History:   Diagnosis Date   • Asthma    • Colon polyp    • GERD (gastroesophageal reflux disease)    • Hiatal hernia    • Hyperlipidemia    • Hypertension    • Migraine    • PFO (patent foramen ovale)      Past Surgical History:   Procedure Laterality Date   • CARDIAC CATHETERIZATION N/A 2021    Procedure: LEFT HEART CATH;  Surgeon: Adelso Burnett IV, MD;  Location:  [facility];  Service: Cardiovascular;  Laterality: N/A;   • CARDIAC CATHETERIZATION N/A 2021    Procedure: Coronary angiography;  Surgeon: Adelso Burnett IV, MD;  Location:  [facility];  Service: Cardiovascular;  Laterality: N/A;   • COLONOSCOPY     • GASTRIC PH MONITORING 24HR N/A 2020    Procedure: GASTRIC PH MONITORING 24HR;  Surgeon: Cliff Sheffield MD;  Location:  [facility];  Service: General;  Laterality: N/A;  Probe successfully placed 25 at left nare   • UPPER GASTROINTESTINAL ENDOSCOPY       General Information     Row Name 21 1042           Physical Therapy Time and Intention    Document Type  evaluation  -CD     Mode of Treatment  physical therapy  -CD     Row Name 04/11/21 1042          General Information    Patient Profile Reviewed  yes  -CD     Prior Level of Function  independent:;all household mobility;community mobility;gait;transfer;ADL's;driving PT REPORTS HER FAMILY DOES NOT LIKE HER TO GO OUT ALONE, ESPECIALLY TO [place] DUE TO EPISODES OF GETTING TURNED AROUND AND FORGETTING HOW TO USE THE TURN SIGNAL. PT REPORTS INTERMITENT BRIEF EPISODES OF DISORIENTATION. DR SAHU NOTIFIED.  -CD     Existing Precautions/Restrictions  fall  -CD     Barriers to Rehab  none identified;ineffective coping PT MILDLY ANXIOUS AND SOA WITH ACTIVITY.  -CD     Row Name 04/11/21 1042          Home Main Entrance    Number of Stairs, Main Entrance  other (see comments)  -CD     Row Name 04/11/21 1042          Stairs Within Home, Primary    Stairs, Within Home, Primary  15 STEPS FROM BASEMENT TO ENTER HOME. LAUNDRY IN BASEMENT.  -CD     Stair Railings, Within Home, Primary  railings safe and in good condition  -CD     Row Name 04/11/21 1042          Cognition    Orientation Status (Cognition)  oriented x 3  -CD     Row Name 04/11/21 1042          Safety Issues, Functional Mobility    Safety Issues Affecting Function (Mobility)  positioning of assistive device;safety precaution awareness;safety precautions follow-through/compliance  -CD     Impairments Affecting Function (Mobility)  balance;endurance/activity tolerance;pain;shortness of breath  -CD     Cognitive Impairments, Mobility Safety/Performance  attention;insight into deficits/self-awareness;safety precaution awareness  -CD     Comment, Safety Issues/Impairments (Mobility)  PT ABLE TO READ SIGNS IN [place]. APPEARED ANXIOUS DURING AMBULATION AND SOA BUT SATS STABLE ON RA.  -CD       User Key  (r) = Recorded By, (t) = Taken By, (c) = Cosigned By    Initials Name Provider Type    CD Candi Rascon, PT Physical  Therapist        Mobility     Row Name 04/11/21 1050          Bed Mobility    Comment (Bed Mobility)  PT [facility] UPON ARRIVAL.  -CD     Row Name 04/11/21 1050          Sit-Stand Transfer    Sit-Stand [place] (Transfers)  contact guard  -CD     Row Name 04/11/21 1050          Gait/Stairs (Locomotion)    [place] Level (Gait)  contact guard  -CD     Distance in Feet (Gait)  200 FEET  -CD     Deviations/Abnormal Patterns (Gait)  base of support, wide;nicolle decreased;gait speed decreased WITH L UE SUPPORT.  -CD     Comment (Gait/Stairs)  GAIT GUARDED WITH NO RECIPROCAL ARM SWING, WIDE MICKI AND SHORT STEP LENGTH.  -CD       User Key  (r) = Recorded By, (t) = Taken By, (c) = Cosigned By    Initials Name Provider Type    Candi Benjamin PT Physical Therapist        Obj/Interventions     Row Name 04/11/21 1053          Range of Motion Comprehensive    General Range of Motion  bilateral lower extremity ROM WNL  -CD     Row Name 04/11/21 1053          Strength Comprehensive (MMT)    General Manual Muscle Testing (MMT) Assessment  no strength deficits identified  -CD     Row Name 04/11/21 1053          Motor Skills    Motor Skills  coordination  -CD     Coordination  gross motor deficit;lower extremity;minimal impairment;heel to shin;bilateral LEFT >R.  -CD     Row Name 04/11/21 1053          Balance    Balance Assessment  sitting static balance;sitting dynamic balance;standing static balance;standing dynamic balance  -CD     Static Sitting Balance  WFL  -CD     Dynamic Sitting Balance  WFL  -CD     Static Standing Balance  mild impairment;supported;standing  -CD     Dynamic Standing Balance  mild impairment;supported;standing  -CD     Balance Interventions  sitting;standing;sit to stand;supported;static;dynamic  -CD     Comment, Balance  GAIT IN [place].  -CD       User Key  (r) = Recorded By, (t) = Taken By, (c) = Cosigned By    Initials Name Provider Type    Candi Benjamin PT Physical Therapist        Goals/Plan      Row Name 04/11/21 1059          Transfer Goal 1 (PT)    Activity/Assistive Device (Transfer Goal 1, PT)  sit-to-stand/stand-to-sit;bed-to-chair/chair-to-bed  -CD     [place] Level/Cues Needed (Transfer Goal 1, PT)  independent  -CD     Time Frame (Transfer Goal 1, PT)  long term goal (LTG);2 weeks  -CD     Row Name 04/11/21 1059          Gait Training Goal 1 (PT)    Activity/Assistive Device (Gait Training Goal 1, PT)  gait (walking locomotion)  -CD     [place] Level (Gait Training Goal 1, PT)  independent  -CD     Distance (Gait Training Goal 1, PT)  600 FEET  -CD     Time Frame (Gait Training Goal 1, PT)  long term goal (LTG);2 weeks  -CD       User Key  (r) = Recorded By, (t) = Taken By, (c) = Cosigned By    Initials Name Provider Type    CD Candi Rascon, PT Physical Therapist        Clinical Impression     Row Name 04/11/21 1051          Plan of Care Review    Plan of Care Reviewed With  patient;spouse;daughter DTR IS A NURSE AT [facility].  -CD     Outcome Summary  PT PRESENTS WITH EVOLVING SYMPTOMS TO INCLUDE IMPAIRED BALANCE, IMPAIRED COORDINATION, MILD EXPRESSIVE APHASIA, ANXIETY, AND DECLINE IN FUNCTIONAL MOBILITY. PT AMBULATED 200 FEET WITH L UE SUPPORT DEMONSTRATING WIDE MICKI AND SHORTENED STEP LENGTH. RECOMMEND OPPT AT D/C.  -CD     Row Name 04/11/21 1050          Therapy Assessment/Plan (PT)    Patient/Family Therapy Goals Statement (PT)  DID NOT STATE. DISCUSSED POSSIBILITY OF OPPT TO IMPROVE COORDINATION, BALANCE, AND SAFETY WITH MOBILITY.  -CD     Rehab Potential (PT)  good, to achieve stated therapy goals  -CD     Criteria for Skilled Interventions Met (PT)  yes  -CD     Predicted Duration of Therapy Intervention (PT)  2 WEEKS  -CD     Row Name 04/11/21 1055          Vital Signs    Pre Systolic BP Rehab  114  -CD     Pre Treatment Diastolic BP  74  -CD     Intra Treatment Diastolic BP  114  -CD     Post Systolic BP Rehab  70  -CD     Pretreatment Heart Rate (beats/min)  82  -CD      Posttreatment Heart Rate (beats/min)  84  -CD     Pre SpO2 (%)  100  -CD     O2 Delivery Pre Treatment  room air  -CD     O2 Delivery Intra Treatment  room air  -CD     Post SpO2 (%)  100  -CD     O2 Delivery Post Treatment  room air  -CD     Pre Patient Position  Supine  -CD     Intra Patient Position  Standing  -CD     Post Patient Position  Sitting  -CD     Row Name 04/11/21 1055          Positioning and Restraints    Pre-Treatment Position  sitting in chair/recliner  -CD     Post Treatment Position  chair  -CD     In Chair  reclined;call light within reach;encouraged to call for assist;exit alarm on;with family/caregiver;legs elevated;notified nsg PT AGREES TO CALL OUT FOR ASSIST WITH MOBILITY,  -CD       User Key  (r) = Recorded By, (t) = Taken By, (c) = Cosigned By    Initials Name Provider Type    Candi Benjamin PT Physical Therapist        Outcome Measures     Row Name 04/11/21 1100          How much help from another person do you currently need...    Turning from your back to your side while in flat bed without using bedrails?  4  -CD     Moving from lying on back to sitting on the side of a flat bed without bedrails?  3  -CD     Moving to and from a bed to a chair (including a wheelchair)?  3  -CD     Standing up from a chair using your arms (e.g., wheelchair, bedside chair)?  3  -CD     Climbing 3-5 steps with a railing?  3  -CD     To walk in hospital room?  3  -CD     AM-PAC 6 Clicks Score (PT)  19  -CD     Row Name 04/11/21 1100          Functional Assessment    Outcome Measure Options  AM-PAC 6 Clicks Basic Mobility (PT)  -CD       User Key  (r) = Recorded By, (t) = Taken By, (c) = Cosigned By    Initials Name Provider Type    Candi Benjamin PT Physical Therapist        Physical Therapy Education                 Title: PT OT SLP Therapies (Done)     Topic: Physical Therapy (Done)     Point: Mobility training (Done)     Learning Progress Summary           Patient Acceptance, E, VU,NR by CD at  4/11/2021 1101    Comment: BENEFITS OF OOB ACTIVITY, SAFETY WITH MOBILITY, PROGRESSION OF POC, D/C PLANNING,   Family Acceptance, E, VU,NR by CD at 4/11/2021 1101    Comment: BENEFITS OF OOB ACTIVITY, SAFETY WITH MOBILITY, PROGRESSION OF POC, D/C PLANNING,                   Point: Home exercise program (Done)     Learning Progress Summary           Patient Acceptance, E, VU,NR by CD at 4/11/2021 1101    Comment: BENEFITS OF OOB ACTIVITY, SAFETY WITH MOBILITY, PROGRESSION OF POC, D/C PLANNING,   Family Acceptance, E, VU,NR by CD at 4/11/2021 1101    Comment: BENEFITS OF OOB ACTIVITY, SAFETY WITH MOBILITY, PROGRESSION OF POC, D/C PLANNING,                   Point: Body mechanics (Done)     Learning Progress Summary           Patient Acceptance, E, VU,NR by CD at 4/11/2021 1101    Comment: BENEFITS OF OOB ACTIVITY, SAFETY WITH MOBILITY, PROGRESSION OF POC, D/C PLANNING,   Family Acceptance, E, VU,NR by CD at 4/11/2021 1101    Comment: BENEFITS OF OOB ACTIVITY, SAFETY WITH MOBILITY, PROGRESSION OF POC, D/C PLANNING,                   Point: Precautions (Done)     Learning Progress Summary           Patient Acceptance, E, VU,NR by CD at 4/11/2021 1101    Comment: BENEFITS OF OOB ACTIVITY, SAFETY WITH MOBILITY, PROGRESSION OF POC, D/C PLANNING,   Family Acceptance, E, VU,NR by CD at 4/11/2021 1101    Comment: BENEFITS OF OOB ACTIVITY, SAFETY WITH MOBILITY, PROGRESSION OF POC, D/C PLANNING,                               User Key     Initials Effective Dates Name Provider Type Discipline    CD 06/19/15 -  Candi Rascon PT Physical Therapist PT              PT Recommendation and Plan  Planned Therapy Interventions (PT): balance training, bed mobility training, gait training, home exercise program, transfer training, patient/family education, strengthening  Plan of Care Reviewed With: patient, spouse, daughter (DTR IS A NURSE AT [facility].)  Outcome Summary: PT PRESENTS WITH EVOLVING SYMPTOMS TO INCLUDE IMPAIRED BALANCE, IMPAIRED  COORDINATION, MILD EXPRESSIVE APHASIA, ANXIETY, AND DECLINE IN FUNCTIONAL MOBILITY. PT AMBULATED 200 FEET WITH L UE SUPPORT DEMONSTRATING WIDE MICKI AND SHORTENED STEP LENGTH. RECOMMEND OPPT AT D/C.     Time Calculation:   PT Charges     Row Name 04/11/21 1103             Time Calculation    Start Time  0945  -CD      PT Received On  04/11/21  -CD      PT Goal Re-Cert Due Date  04/21/21  -CD         Time Calculation- PT    Total Timed Code Minutes- PT  15 minute(s)  -CD         Timed Charges    89307 - Gait Training Minutes   15  -CD        User Key  (r) = Recorded By, (t) = Taken By, (c) = Cosigned By    Initials Name Provider Type    CD Candi Rascon, PT Physical Therapist        Therapy Charges for Today     Code Description Service Date Service Provider Modifiers Qty    73616022967 HC GAIT TRAINING EA 15 MIN 4/11/2021 Candi Rascon, PT GP 1    58222021043 HC PT EVAL LOW COMPLEXITY 4 4/11/2021 Candi Rascon, PT GP 1          PT G-Codes  Outcome Measure Options: AM-PAC 6 Clicks Basic Mobility (PT)  AM-PAC 6 Clicks Score (PT): 19    Candi Rascon PT  4/11/2021

## 2021-04-11 NOTE — PROGRESS NOTES
[facility] Medicine Services  PROGRESS NOTE    Patient Name: Delphine Das  : 1960  MRN: [id number]    Date of Admission: 4/10/2021  Primary Care Physician: Timur Virgen MD    Subjective   Subjective     CC: Follow-up numbness and weakness    HPI: No acute events overnight, numbness and aphasia have resolved, patient currently having a headache.    ROS:  Gen- No fevers, chills  CV- No chest pain, palpitations  Resp- No cough, dyspnea  GI- No N/V/D, abd pain    All other systems reviewed and are negative    Objective   Objective     Vital Signs:   Temp:  [97.6 °F (36.4 °C)-98.7 °F (37.1 °C)] 97.7 °F (36.5 °C)  Heart Rate:  [68-94] 77  Resp:  [18-19] 18  BP: ()/(53-86) 101/60  Total (NIH Stroke Scale): 2     Physical Exam:  Constitutional: No acute distress, awake, alert  HENT: NCAT, mucous membranes moist  Respiratory: Clear to auscultation bilaterally, respiratory effort normal   Cardiovascular: RRR, no murmurs, rubs, or gallops  Gastrointestinal: Positive bowel sounds, soft, nontender, nondistended  Musculoskeletal: No bilateral ankle edema  Psychiatric: Appropriate affect, cooperative  Neurologic: Oriented x 3, nonfocal  Skin: No rashes    Results Reviewed:  Results from last 7 days   Lab Units 21  0743 04/10/21  1343   WBC 10*3/mm3 5.11 5.63   HEMOGLOBIN g/dL 13.1 15.0   HEMATOCRIT % 41.0 45.7   PLATELETS 10*3/mm3 294 272   INR   --  1.03     Results from last 7 days   Lab Units 21  0743 04/10/21  1521 04/10/21  1343   SODIUM mmol/L 142  --  138   POTASSIUM mmol/L 3.6  --  4.0   CHLORIDE mmol/L 115*  --  107   CO2 mmol/L 18.0*  --  20.0*   BUN mg/dL 9  --  11   CREATININE mg/dL 0.94  --  1.06*   GLUCOSE mg/dL 94  --  112*   CALCIUM mg/dL 8.9  --  8.7   ALT (SGPT) U/L 18  --  20   AST (SGOT) U/L 23  --  29   TROPONIN T ng/mL  --  <0.010 <0.010   PROBNP pg/mL  --   --  26.0     Estimated Creatinine Clearance: 76.5 mL/min (by C-G formula based on SCr of  0.94 mg/dL).    Microbiology Results Abnormal     Procedure Component Value - Date/Time    COVID-19,CEPHEID,[facility] IN-HOUSE(OR EMERGENT/ADD-ON),NP SWAB IN TRANSPORT MEDIA 3-4 HR TAT - Swab, Nasopharynx [[id number]]  (Normal) Collected: 04/11/21 0339    Lab Status: Final result Specimen: Swab from Nasopharynx Updated: 04/11/21 0712     COVID19 Not Detected    Narrative:      Fact sheet for providers: https://www.fda.gov/media/130732/download     Fact sheet for patients: https://www.fda.gov/media/477771/download          Imaging Results (Last 24 Hours)     Procedure Component Value Units Date/Time    MRI Brain Without Contrast [[id number]] Collected: 04/10/21 1721     Updated: 04/10/21 1736    Narrative:      EXAMINATION: MRI ANGIOGRAM HEAD WO CONTRAST-, MRI BRAIN WO CONTRAST-,  MRI ANGIOGRAM NECK W CONTRAST-      INDICATION: generalized numbness and AMS w/ expressive aphasia;  R41.82-Altered mental status, unspecified; R20.0-Anesthesia of skin;  R20.2-Paresthesia of skin; R47.01-Aphasia     TECHNIQUE: Multiplanar multisequence MRI of the brain performed without  IV contrast. Noncontrast time-of-flight 3-dimensional MR angiogram of  the head and neck with three-dimensional maximum intensity projections  postprocessed. Additional postcontrast MR angiogram of the neck.     COMPARISON: NONE     FINDINGS: MRI brain: No acute infarct noted on diffusion weighted  sequences. Midline structures are unremarkable and the craniocervical  junction is satisfactory in appearance. The ventricles are normal in  size and configuration. There is no evidence of intracranial hemorrhage,  mass or mass effect. The orbits are normal and the paranasal sinuses are  grossly clear.     MR angiogram: Patent great vessel origins. On the right, no significant  narrowing of the ICA origin, 0% by NASCET criteria. Similar 0% narrowing  on the left. The vertebral arteries are normal in course and caliber  bilaterally. Intracranially, no significant  atherosclerotic change of  the carotid siphons. The branching [place] of [place] vessels demonstrate  no evidence of flow-limiting stenosis, large vessel occlusion or  aneurysmal dilatation. The vertebrobasilar system is unremarkable.       Impression:      Normal MRI of the brain with no evidence of ischemia,  hemorrhage, mass or mass effect.     Normal MR angiogram of the head and neck with no evidence of  flow-limiting stenosis, large vessel occlusion or aneurysmal dilatation.        This report was finalized on 4/10/2021 5:32 PM by Stevo Syed.       MRI Angiogram Head Without Contrast [[id number]] Collected: 04/10/21 1721     Updated: 04/10/21 1736    Narrative:      EXAMINATION: MRI ANGIOGRAM HEAD WO CONTRAST-, MRI BRAIN WO CONTRAST-,  MRI ANGIOGRAM NECK W CONTRAST-      INDICATION: generalized numbness and AMS w/ expressive aphasia;  R41.82-Altered mental status, unspecified; R20.0-Anesthesia of skin;  R20.2-Paresthesia of skin; R47.01-Aphasia     TECHNIQUE: Multiplanar multisequence MRI of the brain performed without  IV contrast. Noncontrast time-of-flight 3-dimensional MR angiogram of  the head and neck with three-dimensional maximum intensity projections  postprocessed. Additional postcontrast MR angiogram of the neck.     COMPARISON: NONE     FINDINGS: MRI brain: No acute infarct noted on diffusion weighted  sequences. Midline structures are unremarkable and the craniocervical  junction is satisfactory in appearance. The ventricles are normal in  size and configuration. There is no evidence of intracranial hemorrhage,  mass or mass effect. The orbits are normal and the paranasal sinuses are  grossly clear.     MR angiogram: Patent great vessel origins. On the right, no significant  narrowing of the ICA origin, 0% by NASCET criteria. Similar 0% narrowing  on the left. The vertebral arteries are normal in course and caliber  bilaterally. Intracranially, no significant atherosclerotic change of  the  carotid siphons. The branching [place] of [place] vessels demonstrate  no evidence of flow-limiting stenosis, large vessel occlusion or  aneurysmal dilatation. The vertebrobasilar system is unremarkable.       Impression:      Normal MRI of the brain with no evidence of ischemia,  hemorrhage, mass or mass effect.     Normal MR angiogram of the head and neck with no evidence of  flow-limiting stenosis, large vessel occlusion or aneurysmal dilatation.        This report was finalized on 4/10/2021 5:32 PM by Stevo Syed.       MRI Angiogram Neck With Contrast [[id number]] Collected: 04/10/21 1721     Updated: 04/10/21 1736    Narrative:      EXAMINATION: MRI ANGIOGRAM HEAD WO CONTRAST-, MRI BRAIN WO CONTRAST-,  MRI ANGIOGRAM NECK W CONTRAST-      INDICATION: generalized numbness and AMS w/ expressive aphasia;  R41.82-Altered mental status, unspecified; R20.0-Anesthesia of skin;  R20.2-Paresthesia of skin; R47.01-Aphasia     TECHNIQUE: Multiplanar multisequence MRI of the brain performed without  IV contrast. Noncontrast time-of-flight 3-dimensional MR angiogram of  the head and neck with three-dimensional maximum intensity projections  postprocessed. Additional postcontrast MR angiogram of the neck.     COMPARISON: NONE     FINDINGS: MRI brain: No acute infarct noted on diffusion weighted  sequences. Midline structures are unremarkable and the craniocervical  junction is satisfactory in appearance. The ventricles are normal in  size and configuration. There is no evidence of intracranial hemorrhage,  mass or mass effect. The orbits are normal and the paranasal sinuses are  grossly clear.     MR angiogram: Patent great vessel origins. On the right, no significant  narrowing of the ICA origin, 0% by NASCET criteria. Similar 0% narrowing  on the left. The vertebral arteries are normal in course and caliber  bilaterally. Intracranially, no significant atherosclerotic change of  the carotid siphons. The branching [place]  of [place] vessels demonstrate  no evidence of flow-limiting stenosis, large vessel occlusion or  aneurysmal dilatation. The vertebrobasilar system is unremarkable.       Impression:      Normal MRI of the brain with no evidence of ischemia,  hemorrhage, mass or mass effect.     Normal MR angiogram of the head and neck with no evidence of  flow-limiting stenosis, large vessel occlusion or aneurysmal dilatation.        This report was finalized on 4/10/2021 5:32 PM by Stevo Syed.             Results for orders placed during the hospital encounter of 06/04/20    Adult Transthoracic Echo Complete W/ Cont if Necessary Per Protocol    Interpretation Summary  · Estimated EF appears to be in the range of 61 - 65%.  · Left ventricular systolic function is normal.  · Left ventricular diastolic dysfunction (grade I) consistent with impaired relaxation.  · Saline test results are positive for right to left atrial level shunt.  · No significant structural or functional valvular disease.      I have reviewed the medications:  Scheduled Meds:amitriptyline, 50 mg, Oral, Nightly  DULoxetine, 90 mg, Oral, Daily  levETIRAcetam, 500 mg, Oral, BID  pantoprazole, 40 mg, Oral, BID AC  pravastatin, 40 mg, Oral, Nightly  sodium chloride, 10 mL, Intravenous, Q12H  topiramate, 50 mg, Oral, Q12H      Continuous Infusions:sodium chloride, 75 mL/hr, Last Rate: 75 mL/hr (04/11/21 1012)      PRN Meds:.•  acetaminophen  •  sodium chloride  •  sodium chloride    Assessment/Plan   Assessment & Plan     Active Hospital Problems    Diagnosis  POA   • Acute alteration in mental status [R41.82]  Yes   • Numbness [R20.0]  Yes   • Dizziness [R42]  Yes   • Difficulty speaking [R47.9]  Yes   • DIXON (acute kidney injury) (CMS/HCC) [N17.9]  Unknown   • Hoarse voice quality [R49.0]  Unknown      Resolved Hospital Problems   No resolved problems to display.        Brief Hospital Course to date:  Delphine Das is a 60 y.o. female with history of  hyperlipidemia, migraines, known PFO, recent laryngitis, who presented with acute onset numbness, expressive aphasia, admitted with suspected TIA    Plan  TIA vs CVA vs anxiety attack vs complex migraine  -Her symptoms have since resolved, CT head, MRI head, MRA head and neck are all unrevealing, follow-up echo, patient with history of PFO  -Neurology consulted, s/p aspirin, continue statin  -PT/OT/SLP following    Anxiety and depression-continue home meds    Hypertension  -BP remains low, continue IV fluids, hold antihypertensives    History of migraines  -Continue home Keppra, Topamax    Mild renal insufficiency, resolved  -Baseline creatinine is~0.6-0.7, presented with 1.06, continue IV fluids    DVT Prophylaxis: Mechanical    Disposition: TBD    CODE STATUS:   Code Status and Medical Interventions:   Ordered at: 04/10/21 1814     Limited Support to NOT Include:    Intubation     Level Of Support Discussed With:    Patient    Next of Kin (If No Surrogate)     Code Status:    No CPR     Medical Interventions (Level of Support Prior to Arrest):    Limited       Adina Corea MD  04/11/21

## 2021-04-11 NOTE — THERAPY EVALUATION
Acute Care - Speech Language Pathology   Swallow Initial Evaluation  [place]   Clinical Swallow Evaluation       Patient Name: Delphine Das  : 1960  MRN: [id number]  Today's Date: 2021               Admit Date: 4/10/2021    Visit Dx:     ICD-10-CM ICD-9-CM   1. Acute alteration in mental status  R41.82 780.97   2. Numbness and tingling of upper and lower extremities of both sides  R20.0 782.0    R20.2    3. Expressive aphasia  R47.01 784.3     Patient Active Problem List   Diagnosis   • Chest pain with normal angiography   • Dyspnea on exertion   • Laryngitis   • PFO (patent foramen ovale) : Suspected with atrial level shunt on bubble study echo 2020.   • Migraines   • Vocal cord dysfunction - suspected.    • Hiatal hernia   • GERD (gastroesophageal reflux disease)   • Hoarse voice quality   • Gastroesophageal reflux disease   • Mixed hyperlipidemia   • Acute alteration in mental status   • Numbness   • Dizziness   • Difficulty speaking   • DIXON (acute kidney injury) (CMS/HCC)     Past Medical History:   Diagnosis Date   • Asthma    • Colon polyp    • GERD (gastroesophageal reflux disease)    • Hiatal hernia    • Hyperlipidemia    • Hypertension    • Migraine    • PFO (patent foramen ovale)      Past Surgical History:   Procedure Laterality Date   • CARDIAC CATHETERIZATION N/A 2021    Procedure: LEFT HEART CATH;  Surgeon: Adelso Burnett IV, MD;  Location:  [facility];  Service: Cardiovascular;  Laterality: N/A;   • CARDIAC CATHETERIZATION N/A 2021    Procedure: Coronary angiography;  Surgeon: Adelso Burnett IV, MD;  Location:  [facility];  Service: Cardiovascular;  Laterality: N/A;   • COLONOSCOPY     • GASTRIC PH MONITORING 24HR N/A 2020    Procedure: GASTRIC PH MONITORING 24HR;  Surgeon: Cliff Sheffield MD;  Location:  [facility];  Service: General;  Laterality: N/A;  Probe successfully placed 25 at left nare    • UPPER GASTROINTESTINAL ENDOSCOPY          SWALLOW EVALUATION (last 72 hours)      SLP Adult Swallow Evaluation     Row Name 04/11/21 1000       Rehab Evaluation    Document Type  evaluation  -CJ    Subjective Information  no complaints  -CJ    Patient Observations  alert;cooperative  -CJ    Patient/Family/Caregiver Comments/Observations  no family present  -CJ    Care Plan Review  evaluation/treatment results reviewed;patient/other agree to care plan  -CJ    Patient Effort  good  -CJ    Symptoms Noted During/After Treatment  none  -CJ       General Information    Patient Profile Reviewed  yes  -CJ    Pertinent History Of Current Problem  Pt adm w/ possible CVA; Pt reports difficulty swallowing w/ globus sensation. Pt has sig h/o vocal hoarseness, ams, numbness, DXION, laryngitis, PFO, hiatal hernia, VF dysfunction. Pt reports initially had expressive aphasia this has since resolved.   -CJ    Current Method of Nutrition  regular textures;thin liquids  -CJ    Precautions/Limitations, Vision  WFL  -CJ    Precautions/Limitations, Hearing  WFL;for purposes of eval  -CJ    Prior Level of Function-Communication  WFL  -CJ    Prior Level of Function-Swallowing  no diet consistency restrictions;safe, efficient swallowing in all situations  -CJ    Plans/Goals Discussed with  patient;agreed upon  -    Barriers to Rehab  none identified  -CJ    Patient's Goals for Discharge  patient did not state  -       Pain    Additional Documentation  Pain Scale: FACES Pre/Post-Treatment (Group)  -       Pain Scale: FACES Pre/Post-Treatment    Pain: FACES Scale, Pretreatment  0-->no hurt  -CJ    Posttreatment Pain Rating  0-->no hurt  -CJ       Oral Motor Structure and Function    Dentition Assessment  natural, present and adequate  -CJ    Secretion Management  WNL/WFL  -CJ    Mucosal Quality  moist, healthy  -CJ    Volitional Swallow  WFL  -CJ    Volitional Cough  WFL  -       Oral Musculature and Cranial Nerve Assessment    Oral  Motor General Assessment  WFL  -CJ       General Eating/Swallowing Observations    Respiratory Support Currently in Use  room air  -    Eating/Swallowing Skills  self-fed  -    Positioning During Eating  upright 90 degree;upright in bed  -    Utensils Used  spoon;cup;straw  -    Consistencies Trialed  regular textures;ice chips;thin liquids;pureed  -       Respiratory    Respiratory Status  WFL  -CJ       Clinical Swallow Eval    Oral Prep Phase  WFL  -CJ    Oral Transit  WFL  -CJ    Oral Residue  WFL  -CJ    Pharyngeal Phase  no overt signs/symptoms of pharyngeal impairment  -    Esophageal Phase  suspected esophageal impairment  -    Clinical Swallow Evaluation Summary  CSE completed this am. Ms. Das is positioned upright and centered in bed to accept po presentations of puree, ice chips, regular solids, and thin liquids via spoon, cup and straw. Pt reports ongoing globus sensation which causes a habitual throat clear intermittently. Oral phase is seemingly wfl. NO overt s/s of aspiration. No clinical s/s concerning for silent aspiration as pt is w/o changes in vocal quality respirations or secretions post po presentations. Per this evaluation Ms. Das presents w/ seemingly wfl oropharyngeal swallowing fnx however given pt's report of consistent globus sensation. She may benefit from MBS w/ limited upper GI. Will plan for this tomorrow if pt hasn't been discharged. If pt were to d/c before tomorrow can be completed as outpatient. She is agreeable to this. Please continue MD ordered po diet of regular w/ thin liquids.  -       Esophageal Phase Concerns    Esophageal Phase Concerns  globus  -    Globus  all consistencies  -       Clinical Impression    SLP Swallowing Diagnosis  R/O pharyngeal dysphagia;esophageal dysphagia  -    Functional Impact  risk of aspiration/pneumonia  -    Rehab Potential/Prognosis, Swallowing  good, to achieve stated therapy goals  -    Swallow Criteria for  Skilled Therapeutic Interventions Met  demonstrates skilled criteria  -    Plan for Continued Treatment (SLP)  Plan for MBS w/ limited UGI for tomorrow if pt hasn't discharged. If pt were to discharge today can be completed as an outpatient.  -       Recommendations    Predicted Duration Therapy Intervention (Days)  until discharge  -    SLP Diet Recommendation  regular textures;thin liquids;other (see comments) MD ordered po diet  -    Recommended Diagnostics  reassess via VFSS (MBS);other (see comments) + limited UGI 4/12/2021  -    Recommended Precautions and Strategies  upright posture during/after eating;general aspiration precautions;reflux precautions  -    Oral Care Recommendations  Oral Care BID/PRN  -    SLP Rec. for Method of Medication Administration  meds whole;with pudding or applesauce  -    Monitor for Signs of Aspiration  yes;notify SLP if any concerns  -    Anticipated Discharge Disposition (SLP)  unknown;anticipate therapy at next level of care  -      User Key  (r) = Recorded By, (t) = Taken By, (c) = Cosigned By    Initials Name Effective Dates    Kerry Kelly, MS CCC-SLP 02/11/20 -           EDUCATION  The patient has been educated in the following areas:   Dysphagia (Swallowing Impairment) Oral Care/Hydration.    SLP Recommendation and Plan  SLP Swallowing Diagnosis: R/O pharyngeal dysphagia, esophageal dysphagia  SLP Diet Recommendation: regular textures, thin liquids, other (see comments) (MD ordered po diet)  Recommended Precautions and Strategies: upright posture during/after eating, general aspiration precautions, reflux precautions  SLP Rec. for Method of Medication Administration: meds whole, with pudding or applesauce     Monitor for Signs of Aspiration: yes, notify SLP if any concerns  Recommended Diagnostics: reassess via VFSS (MBS), other (see comments) (+ limited UGI 4/12/2021)  Swallow Criteria for Skilled Therapeutic Interventions Met: demonstrates  skilled criteria  Anticipated Discharge Disposition (SLP): unknown, anticipate therapy at next level of care  Rehab Potential/Prognosis, Swallowing: good, to achieve stated therapy goals     Predicted Duration Therapy Intervention (Days): until discharge          Plan for Continued Treatment (SLP): Plan for MBS w/ limited UGI for tomorrow if pt hasn't discharged. If pt were to discharge today can be completed as an outpatient.              Plan of Care Reviewed With: patient  Progress:  (initial eval)           Time Calculation:   Time Calculation- SLP     Row Name 04/11/21 1250             Time Calculation- SLP    SLP Start Time  1000  -CJ      SLP Received On  04/11/21  -        User Key  (r) = Recorded By, (t) = Taken By, (c) = Cosigned By    Initials Name Provider Type    Kerry Kelly MS CCC-SLP Speech and Language Pathologist          Therapy Charges for Today     Code Description Service Date Service Provider Modifiers Qty    [id number] HC ST EVAL ORAL PHARYNG SWALLOW 3 4/11/2021 Kerry Dempsey MS CCC-SLP GN 1        Patient was not wearing a face mask and did exhibit coughing during this therapy encounter.  Procedure performed was aerosolizing, involved close contact (within 6 feet for at least 15 minutes or longer), and did not involve contact with infectious secretions or specimens.  Therapist used appropriate personal protective equipment including gloves, standard procedure mask and eye protection.  Appropriate PPE was worn during the entire therapy session.  Hand hygiene was completed before and after therapy session.          Kerry Dempsey MS CCC-SLP  4/11/2021

## 2021-04-12 ENCOUNTER — READMISSION MANAGEMENT (OUTPATIENT)
Dept: CALL CENTER | Facility: HOSPITAL | Age: 61
End: 2021-04-12

## 2021-04-12 ENCOUNTER — APPOINTMENT (OUTPATIENT)
Dept: GENERAL RADIOLOGY | Facility: HOSPITAL | Age: 61
End: 2021-04-12

## 2021-04-12 VITALS
BODY MASS INDEX: 27.2 KG/M2 | HEIGHT: 70 IN | RESPIRATION RATE: 16 BRPM | HEART RATE: 80 BPM | SYSTOLIC BLOOD PRESSURE: 118 MMHG | DIASTOLIC BLOOD PRESSURE: 70 MMHG | OXYGEN SATURATION: 96 % | WEIGHT: 190 LBS | TEMPERATURE: 98.3 F

## 2021-04-12 PROBLEM — N17.9 AKI (ACUTE KIDNEY INJURY): Status: RESOLVED | Noted: 2021-04-10 | Resolved: 2021-04-12

## 2021-04-12 PROBLEM — R41.82 ACUTE ALTERATION IN MENTAL STATUS: Status: RESOLVED | Noted: 2021-04-10 | Resolved: 2021-04-12

## 2021-04-12 PROBLEM — R47.9 DIFFICULTY SPEAKING: Status: RESOLVED | Noted: 2021-04-10 | Resolved: 2021-04-12

## 2021-04-12 PROCEDURE — 96361 HYDRATE IV INFUSION ADD-ON: CPT

## 2021-04-12 PROCEDURE — 99217 PR OBSERVATION CARE DISCHARGE MANAGEMENT: CPT | Performed by: INTERNAL MEDICINE

## 2021-04-12 PROCEDURE — 74240 X-RAY XM UPR GI TRC 1CNTRST: CPT

## 2021-04-12 PROCEDURE — 97165 OT EVAL LOW COMPLEX 30 MIN: CPT

## 2021-04-12 PROCEDURE — G0378 HOSPITAL OBSERVATION PER HR: HCPCS

## 2021-04-12 PROCEDURE — 92611 MOTION FLUOROSCOPY/SWALLOW: CPT

## 2021-04-12 PROCEDURE — 74230 X-RAY XM SWLNG FUNCJ C+: CPT

## 2021-04-12 RX ADMIN — TOPIRAMATE 50 MG: 25 TABLET, FILM COATED ORAL at 09:36

## 2021-04-12 RX ADMIN — PANTOPRAZOLE SODIUM 40 MG: 40 TABLET, DELAYED RELEASE ORAL at 06:32

## 2021-04-12 RX ADMIN — BARIUM SULFATE 100 ML: 0.81 POWDER, FOR SUSPENSION ORAL at 10:23

## 2021-04-12 RX ADMIN — SODIUM CHLORIDE, PRESERVATIVE FREE 10 ML: 5 INJECTION INTRAVENOUS at 09:37

## 2021-04-12 RX ADMIN — DULOXETINE HYDROCHLORIDE 90 MG: 60 CAPSULE, DELAYED RELEASE ORAL at 09:36

## 2021-04-12 RX ADMIN — BARIUM SULFATE 20 ML: 400 PASTE ORAL at 10:23

## 2021-04-12 RX ADMIN — ACETAMINOPHEN 650 MG: 325 TABLET ORAL at 09:36

## 2021-04-12 RX ADMIN — LEVETIRACETAM 500 MG: 500 TABLET ORAL at 09:36

## 2021-04-12 NOTE — PLAN OF CARE
Problem: Adult Inpatient Plan of Care  Goal: Plan of Care Review  Recent Flowsheet Documentation  Taken 4/12/2021 0830 by David Tesfaye OT  Progress: improving  Plan of Care Reviewed With: patient  Outcome Summary: Initial OT evaluation completed. Pt does not present w/ deficits warranting skilled OT services on this date. Pt demonstrated independence in bed mobility, in-room functional mobilty, LB dressing, sinkside ADLs, and toileting tasks. Pt w/ symmetrical UE strength and coordination WFL. Recommend d/c to home w/ assist if needed.

## 2021-04-12 NOTE — PROGRESS NOTES
Discharge Planning Assessment  [facility]     Patient Name: Delphine Das  MRN: [id number]  Today's Date: 4/12/2021    Admit Date: 4/10/2021    Discharge Needs Assessment     Row Name 04/12/21 0806       Living Environment    Lives With  spouse    Name(s) of Who Lives With Patient  Aubrey Das () [phone]    Current Living Arrangements  home/apartment/condo    Primary Care Provided by  self    Provides Primary Care For  no one    Family Caregiver if Needed  spouse    Family Caregiver Names  Aubrey Das () [phone]    Able to Return to Prior Arrangements  yes       Resource/Environmental Concerns    Resource/Environmental Concerns  none    Transportation Concerns  car, none       Transition Planning    Patient/Family Anticipates Transition to  home with family    Patient/Family Anticipated Services at Transition  none    Transportation Anticipated  family or friend will provide       Discharge Needs Assessment    Readmission Within the Last 30 Days  no previous admission in last 30 days    Equipment Currently Used at Home  none    Concerns to be Addressed  denies needs/concerns at this time    Anticipated Changes Related to Illness  none    Equipment Needed After Discharge  none        Discharge Plan     Row Name 04/12/21 0807       Plan    Plan  Home with family    Patient/Family in Agreement with Plan  yes    Plan Comments  Spoke to patient at bedside. Lives with Aubrey Das () [phone] in [facility] In indpendent with ADL's. No problems with Medicare/ [place] [employer] or medications. No DME at home. Plan is home with family. CM will continue to follow.    Final Discharge Disposition Code  01 - home or self-care        Continued Care and Services - Admitted Since 4/10/2021    Coordination has not been started for this encounter.         Demographic Summary     Row Name 04/12/21 0805       General Information    Admission Type  observation    Arrived From  emergency department     Referral Source  admission list    Reason for Consult  discharge planning    Preferred Language  English     Used During This Interaction  no       Contact Information    Permission Granted to Share Info With      Contact Information Obtained for      Contact Information Comments  PCP is Timur Virgen MD        Functional Status     Row Name 04/12/21 0806       Functional Status    Usual Activity Tolerance  good    Current Activity Tolerance  good       Functional Status, IADL    Medications  independent    Meal Preparation  independent    Housekeeping  independent    Laundry  independent    Shopping  independent       Mental Status    General Appearance WDL  WDL       Mental Status Summary    Recent Changes in Mental Status/Cognitive Functioning  no changes       Employment/    Employment Status  disabled        Psychosocial    No documentation.       Abuse/Neglect    No documentation.       Legal    No documentation.       Substance Abuse    No documentation.       Patient Forms    No documentation.           Allan Tripathi, RN

## 2021-04-12 NOTE — PROGRESS NOTES
Case Management Discharge Note      Final Note: Plan is home with family.  will transport. Denies any discharge needs.         Selected Continued Care - Admitted Since 4/10/2021     Destination    No services have been selected for the patient.              Durable Medical Equipment    No services have been selected for the patient.              Dialysis/Infusion    No services have been selected for the patient.              Home Medical Care    No services have been selected for the patient.              Therapy    No services have been selected for the patient.              Community Resources    No services have been selected for the patient.                       Final Discharge Disposition Code: 01 - home or self-care

## 2021-04-12 NOTE — OUTREACH NOTE
Prep Survey      Responses   [facility] patient discharged from?  [facility]   Is LACE score < 7 ?  Yes   Emergency Room discharge w/ pulse ox?  No   Eligibility  [facility]   Date of Admission  04/10/21   Date of Discharge  04/12/21   Discharge Disposition  Home or Self Care   Discharge diagnosis  Acute alteration in mental status, DIXON, Hs. Migranes   Does the patient have one of the following disease processes/diagnoses(primary or secondary)?  Other   Does the patient have Home health ordered?  No   Is there a DME ordered?  No   Comments regarding appointments  Please see AVS   Prep survey completed?  Yes          Samira Jimenez RN

## 2021-04-12 NOTE — DISCHARGE SUMMARY
[facility]  DISCHARGE SUMMARY    Patient Name: Delphine Das  : 1960  MRN: [id number]    Date of Admission: 4/10/2021  1:07 PM  Date of Discharge: 2021  Primary Care Physician: Timur Virgen MD    Consults     Date and Time Order Name Status Description    4/10/2021  9:46 PM Inpatient Neurology Consult General Completed           Hospital Course     Presenting Problem:   Acute alteration in mental status [R41.82]    Active Hospital Problems    Diagnosis  POA   • Numbness [R20.0]  Yes   • Dizziness [R42]  Yes   • Hoarse voice quality [R49.0]  Yes      Resolved Hospital Problems    Diagnosis Date Resolved POA   • Acute alteration in mental status [R41.82] 2021 Yes   • Difficulty speaking [R47.9] 2021 Yes   • DIXON (acute kidney injury) (CMS/[facility]) [N17.9] 2021 Yes          Hospital Course:  Delphine Das is a 60 y.o. female with history of hyperlipidemia, migraines, known PFO, recent laryngitis, who presented with acute onset numbness, expressive aphasia.     TIA vs CVA vs anxiety attack vs complex migraine  -Her symptoms quickly resolved. Her CT head, MRI head, MRA head and neck are all unrevealing as was her echocardiogram aside from prior known PFO.   -Neurology followed throughout stay. [place] to be more related to history of migraines. She has a migraine physician but would like a second opinion so will refer to see Dr. Forrester at d/c.  -Also not her bp was quite low upon arrival which may have contributed so will stop her diltiazem. Also cautioned her on Zanaflex use as it may have contributed.     History of migraines  -Continue home migraine meds. F/U Dr. Forrester at d/c.    Discharge Follow Up Recommendations for outpatient labs/diagnostics:   PCP in 1-2 weeks.   Refer to Dr. Forrester.      Day of Discharge     HPI: Up in chair. Feels much better. Wants to go home.    Review of Systems  Gen- No fevers, chills  CV- No chest pain,  palpitations  Resp- No cough, dyspnea  GI- No N/V/D, abd pain    Vital Signs:   Temp:  [97.2 °F (36.2 °C)-98.3 °F (36.8 °C)] 98.3 °F (36.8 °C)  Heart Rate:  [] 81  Resp:  [16-18] 16  BP: (100-122)/(52-77) 118/70     Physical Exam:  Constitutional: No acute distress, awake, alert  HENT: NCAT, mucous membranes moist  Respiratory: Clear to auscultation bilaterally, respiratory effort normal   Cardiovascular: RRR, no murmurs, rubs, or gallops  Gastrointestinal: Positive bowel sounds, soft, nontender, nondistended  Musculoskeletal: No bilateral ankle edema  Psychiatric: Appropriate affect, cooperative  Neurologic: Oriented x 3, strength symmetric in all extremities, Cranial Nerves grossly intact to confrontation, speech clear  Skin: No rashes    Pertinent  and/or Most Recent Results     LAB RESULTS:      Lab 04/11/21  0743 04/10/21  1343   WBC 5.11 5.63   HEMOGLOBIN 13.1 15.0   HEMATOCRIT 41.0 45.7   PLATELETS 294 272   NEUTROS ABS 3.19 3.23   IMMATURE GRANS (ABS) 0.01 0.01   LYMPHS ABS 1.37 1.88   MONOS ABS 0.39 0.39   EOS ABS 0.13 0.10   MCV 91.7 90.5   PROTIME  --  13.2   APTT  --  27.1         Lab 04/11/21  0743 04/10/21  1343   SODIUM 142 138   POTASSIUM 3.6 4.0   CHLORIDE 115* 107   CO2 18.0* 20.0*   ANION GAP 9.0 11.0   BUN 9 11   CREATININE 0.94 1.06*   GLUCOSE 94 112*   CALCIUM 8.9 8.7   HEMOGLOBIN A1C 5.60  --          Lab 04/11/21  0743 04/10/21  1343   TOTAL PROTEIN 6.4 7.7   ALBUMIN 3.90 4.70   GLOBULIN 2.5 3.0   ALT (SGPT) 18 20   AST (SGOT) 23 29   BILIRUBIN 0.3 0.4   ALK PHOS 68 84   LIPASE  --  16         Lab 04/10/21  1521 04/10/21  1343   PROBNP  --  26.0   TROPONIN T <0.010 <0.010   PROTIME  --  13.2   INR  --  1.03         Lab 04/11/21  0743   CHOLESTEROL 145   LDL CHOL 63   HDL CHOL 63*   TRIGLYCERIDES 105             Brief Urine Lab Results  (Last result in the past 365 days)      Color   Clarity   Blood   Leuk Est   Nitrite   Protein   CREAT   Urine HCG        04/10/21 1348 Yellow Clear  Negative Negative Negative Negative             Microbiology Results (last 10 days)     Procedure Component Value - Date/Time    COVID-19,CEPHEID,TAMAR IN-HOUSE(OR EMERGENT/ADD-ON),NP SWAB IN TRANSPORT MEDIA 3-4 HR TAT - Swab, Nasopharynx [[id number]]  (Normal) Collected: 04/11/21 0339    Lab Status: Final result Specimen: Swab from Nasopharynx Updated: 04/11/21 0712     COVID19 Not Detected    Narrative:      Fact sheet for providers: https://www.fda.gov/media/718413/download     Fact sheet for patients: https://www.fda.gov/media/812932/download          Adult Transthoracic Echo Complete w/ Color, Spectral and Contrast if necessary per protocol    Result Date: 4/11/2021  · Left ventricular ejection fraction appears to be 61 - 65%. Left ventricular systolic function is normal. · Left ventricular diastolic function was normal. · Saline test results are positive for right to left atrial level shunt.      MRI Angiogram Head Without Contrast    Result Date: 4/10/2021  EXAMINATION: MRI ANGIOGRAM HEAD WO CONTRAST-, MRI BRAIN WO CONTRAST-, MRI ANGIOGRAM NECK W CONTRAST-  INDICATION: generalized numbness and AMS w/ expressive aphasia; R41.82-Altered mental status, unspecified; R20.0-Anesthesia of skin; R20.2-Paresthesia of skin; R47.01-Aphasia  TECHNIQUE: Multiplanar multisequence MRI of the brain performed without IV contrast. Noncontrast time-of-flight 3-dimensional MR angiogram of the head and neck with three-dimensional maximum intensity projections postprocessed. Additional postcontrast MR angiogram of the neck.  COMPARISON: NONE  FINDINGS: MRI brain: No acute infarct noted on diffusion weighted sequences. Midline structures are unremarkable and the craniocervical junction is satisfactory in appearance. The ventricles are normal in size and configuration. There is no evidence of intracranial hemorrhage, mass or mass effect. The orbits are normal and the paranasal sinuses are grossly clear.  MR angiogram: Patent great  vessel origins. On the right, no significant narrowing of the ICA origin, 0% by NASCET criteria. Similar 0% narrowing on the left. The vertebral arteries are normal in course and caliber bilaterally. Intracranially, no significant atherosclerotic change of the carotid siphons. The branching [place] vessels demonstrate no evidence of flow-limiting stenosis, large vessel occlusion or aneurysmal dilatation. The vertebrobasilar system is unremarkable.      Normal MRI of the brain with no evidence of ischemia, hemorrhage, mass or mass effect.  Normal MR angiogram of the head and neck with no evidence of flow-limiting stenosis, large vessel occlusion or aneurysmal dilatation.   This report was finalized on 4/10/2021 5:32 PM by Stevo Syed.      MRI Angiogram Neck With Contrast    Result Date: 4/10/2021  EXAMINATION: MRI ANGIOGRAM HEAD WO CONTRAST-, MRI BRAIN WO CONTRAST-, MRI ANGIOGRAM NECK W CONTRAST-  INDICATION: generalized numbness and AMS w/ expressive aphasia; R41.82-Altered mental status, unspecified; R20.0-Anesthesia of skin; R20.2-Paresthesia of skin; R47.01-Aphasia  TECHNIQUE: Multiplanar multisequence MRI of the brain performed without IV contrast. Noncontrast time-of-flight 3-dimensional MR angiogram of the head and neck with three-dimensional maximum intensity projections postprocessed. Additional postcontrast MR angiogram of the neck.  COMPARISON: NONE  FINDINGS: MRI brain: No acute infarct noted on diffusion weighted sequences. Midline structures are unremarkable and the craniocervical junction is satisfactory in appearance. The ventricles are normal in size and configuration. There is no evidence of intracranial hemorrhage, mass or mass effect. The orbits are normal and the paranasal sinuses are grossly clear.  MR angiogram: Patent great vessel origins. On the right, no significant narrowing of the ICA origin, 0% by NASCET criteria. Similar 0% narrowing on the left. The vertebral arteries are  normal in course and caliber bilaterally. Intracranially, no significant atherosclerotic change of the carotid siphons. The branching [place] vessels demonstrate no evidence of flow-limiting stenosis, large vessel occlusion or aneurysmal dilatation. The vertebrobasilar system is unremarkable.      Normal MRI of the brain with no evidence of ischemia, hemorrhage, mass or mass effect.  Normal MR angiogram of the head and neck with no evidence of flow-limiting stenosis, large vessel occlusion or aneurysmal dilatation.   This report was finalized on 4/10/2021 5:32 PM by Stevo Syed.      MRI Brain Without Contrast    Result Date: 4/10/2021  EXAMINATION: MRI ANGIOGRAM HEAD WO CONTRAST-, MRI BRAIN WO CONTRAST-, MRI ANGIOGRAM NECK W CONTRAST-  INDICATION: generalized numbness and AMS w/ expressive aphasia; R41.82-Altered mental status, unspecified; R20.0-Anesthesia of skin; R20.2-Paresthesia of skin; R47.01-Aphasia  TECHNIQUE: Multiplanar multisequence MRI of the brain performed without IV contrast. Noncontrast time-of-flight 3-dimensional MR angiogram of the head and neck with three-dimensional maximum intensity projections postprocessed. Additional postcontrast MR angiogram of the neck.  COMPARISON: NONE  FINDINGS: MRI brain: No acute infarct noted on diffusion weighted sequences. Midline structures are unremarkable and the craniocervical junction is satisfactory in appearance. The ventricles are normal in size and configuration. There is no evidence of intracranial hemorrhage, mass or mass effect. The orbits are normal and the paranasal sinuses are grossly clear.  MR angiogram: Patent great vessel origins. On the right, no significant narrowing of the ICA origin, 0% by NASCET criteria. Similar 0% narrowing on the left. The vertebral arteries are normal in course and caliber bilaterally. Intracranially, no significant atherosclerotic change of the carotid siphons. The branching [place] vessels  demonstrate no evidence of flow-limiting stenosis, large vessel occlusion or aneurysmal dilatation. The vertebrobasilar system is unremarkable.      Normal MRI of the brain with no evidence of ischemia, hemorrhage, mass or mass effect.  Normal MR angiogram of the head and neck with no evidence of flow-limiting stenosis, large vessel occlusion or aneurysmal dilatation.   This report was finalized on 4/10/2021 5:32 PM by Stevo Syed.      XR Chest 1 View    Result Date: 4/10/2021  EXAMINATION: XR CHEST 1 VW-  INDICATION: Stroke Protocol (Onset > 12 Hrs)  COMPARISON: 6/4/2020  FINDINGS: No focal airspace opacity. No pleural effusion or pneumothorax. Normal heart and mediastinal contours.      No evidence of acute disease in the chest.  This report was finalized on 4/10/2021 3:10 PM by Stevo Syed.      CT Head Without Contrast Stroke Protocol    Result Date: 4/10/2021  EXAMINATION: CT HEAD WO CONTRAST STROKE PROTOCOL-  INDICATION: Stroke, follow up  TECHNIQUE: Axial noncontrast CT of the head with multiplanar reconstruction  The radiation dose reduction device was turned on for each scan per the ALARA (As Low as Reasonably Achievable) protocol.  COMPARISON: NONE  FINDINGS: Gray-white differentiation is maintained and there is no evidence of intracranial hemorrhage, mass or mass effect. Age-related changes of the brain are present including volume loss and typical periventricular sequela of chronic small vessel ischemia. There is otherwise no evidence of intracranial hemorrhage, mass or mass effect. The ventricles are normal in size and configuration accounting for surrounding volume loss. The orbits are normal and the paranasal sinuses are grossly clear.      Age-related changes of the brain as above, otherwise without evidence of acute intracranial abnormality.  Scan performed 1:06 PM 4/10/2021, results related to the stroke team via the CT technologist by Stevo Syed at 1:16 PM same day   This report  was finalized on 4/10/2021 1:19 PM by Stevo Syed.                Results for orders placed during the hospital encounter of 04/10/21    Adult Transthoracic Echo Complete w/ Color, Spectral and Contrast if necessary per protocol    Interpretation Summary  · Left ventricular ejection fraction appears to be 61 - 65%. Left ventricular systolic function is normal.  · Left ventricular diastolic function was normal.  · Saline test results are positive for right to left atrial level shunt.        Discharge Details        Discharge Medications      Continue These Medications      Instructions Start Date   Ajovy 225 MG/1.5ML solution prefilled syringe  Generic drug: Fremanezumab-vfrm   INJECT 1.5 ML AS DIRECTED SUBCUTANEOUSLY ONCE A MONTH      amitriptyline 50 MG tablet  Commonly known as: ELAVIL   50 mg, Oral, Every Night at Bedtime      dexlansoprazole 60 MG capsule  Commonly known as: DEXILANT   Take 1 capsule by mouth daily      DULOXETINE HCL PO   90 mg, Oral, Daily      estradiol 0.05 MG/24HR patch  Commonly known as: MINIVELLEBOLIVARELLE-DOT   APPLY 1 PATCH TWICE WEEKLY TO DRY SKIN (EXAMPLE: MON/THURS)      fluticasone 50 MCG/ACT nasal spray  Commonly known as: FLONASE   Nasal, As Needed      levETIRAcetam 500 MG tablet  Commonly known as: KEPPRA   500 mg, Oral, 2 Times Daily      pravastatin 40 MG tablet  Commonly known as: PRAVACHOL   40 mg, Oral, Every Night at Bedtime      progesterone 100 MG capsule  Commonly known as: PROMETRIUM   Oral, Daily      promethazine 25 MG tablet  Commonly known as: PHENERGAN   TAKE ONE TABLET BY MOUTH THREE TIMES A DAY AS NEEDED FOR HEADACHE NAUSEA AND VOMITING      rizatriptan 10 MG tablet  Commonly known as: MAXALT   10 mg, Oral, Once As Needed, May repeat in 2 hours if needed       Stiolto Respimat 2.5-2.5 MCG/ACT aerosol solution inhaler  Generic drug: tiotropium bromide-olodaterol   2 puffs, As Needed      tiZANidine 4 MG tablet  Commonly known as: ZANAFLEX   TAKE 1/2 TO 1  TABLET BY MOUTH TWO TIMES A DAY AS NEEDED FOR HEADACHE      topiramate 50 MG tablet  Commonly known as: TOPAMAX   1 tablet, Oral, 2 times daily         Stop These Medications    dilTIAZem  MG 24 hr capsule  Commonly known as: DILACOR XR            No Known Allergies      Discharge Disposition:  Home or Self Care    Diet:  Hospital:  Diet Order   Procedures   • Diet Regular            CODE STATUS:    Code Status and Medical Interventions:   Ordered at: 04/10/21 1814     Limited Support to NOT Include:    Intubation     Level Of Support Discussed With:    Patient    Next of Kin (If No Surrogate)     Code Status:    No CPR     Medical Interventions (Level of Support Prior to Arrest):    Limited       No future appointments.    Additional Instructions for the Follow-ups that You Need to Schedule     Discharge Follow-up with PCP   As directed       Currently Documented PCP:    Timur Virgen MD    PCP Phone Number:    [phone]     Follow Up Details: 1-2 week hospital follow up                     Jolie Moulton II, DO  04/12/21      Time Spent on Discharge:  I spent  33 minutes on this discharge activity which included: face-to-face encounter with the patient, reviewing the data in the system, coordination of the care with the nursing staff as well as consultants, documentation, and entering orders.

## 2021-04-12 NOTE — THERAPY EVALUATION
Patient Name: Delphine Das  : 1960    MRN: [id number]                              Today's Date: 2021       Admit Date: 4/10/2021    Visit Dx:     ICD-10-CM ICD-9-CM   1. Acute alteration in mental status  R41.82 780.97   2. Numbness and tingling of upper and lower extremities of both sides  R20.0 782.0    R20.2    3. Expressive aphasia  R47.01 784.3     Patient Active Problem List   Diagnosis   • Chest pain with normal angiography   • Dyspnea on exertion   • Laryngitis   • PFO (patent foramen ovale) : Suspected with atrial level shunt on bubble study echo 2020.   • Migraines   • Vocal cord dysfunction - suspected.    • Hiatal hernia   • GERD (gastroesophageal reflux disease)   • Hoarse voice quality   • Gastroesophageal reflux disease   • Mixed hyperlipidemia   • Acute alteration in mental status   • Numbness   • Dizziness   • Difficulty speaking   • DIXON (acute kidney injury) (CMS/HCC)     Past Medical History:   Diagnosis Date   • Asthma    • Colon polyp    • GERD (gastroesophageal reflux disease)    • Hiatal hernia    • Hyperlipidemia    • Hypertension    • Migraine    • PFO (patent foramen ovale)      Past Surgical History:   Procedure Laterality Date   • CARDIAC CATHETERIZATION N/A 2021    Procedure: LEFT HEART CATH;  Surgeon: Adelso Burnett IV, MD;  Location:  [facility];  Service: Cardiovascular;  Laterality: N/A;   • CARDIAC CATHETERIZATION N/A 2021    Procedure: Coronary angiography;  Surgeon: Adelso Burnett IV, MD;  Location:  [facility];  Service: Cardiovascular;  Laterality: N/A;   • COLONOSCOPY     • GASTRIC PH MONITORING 24HR N/A 2020    Procedure: GASTRIC PH MONITORING 24HR;  Surgeon: Cliff Sheffield MD;  Location:  [facility];  Service: General;  Laterality: N/A;  Probe successfully placed 25 at left nare   • UPPER GASTROINTESTINAL ENDOSCOPY       General Information     Row Name 21 0820           OT Time and Intention    Document Type  discharge evaluation/summary  -CS     Mode of Treatment  occupational therapy  -CS     Row Name 04/12/21 0820          General Information    Patient Profile Reviewed  yes  -CS     Prior Level of Function  independent:;all household mobility;ADL's;community mobility;home management;driving No AD or DME at baseline  -CS     Barriers to Rehab  none identified  -CS     Row Name 04/12/21 0820          Living Environment    Lives With  spouse  -CS     Row Name 04/12/21 0820          Home Main Entrance    Number of Stairs, Main Entrance  other (see comments)  -CS     Row Name 04/12/21 0820          Stairs Within Home, Primary    Stairs, Within Home, Primary  15 steps from basement to enter home, laundry in basement  -CS     Number of Stairs, Within Home, Primary  other (see comments)  -CS     Stair Railings, Within Home, Primary  railing on left side (ascending);railings safe and in good condition  -CS     Row Name 04/12/21 0820          Cognition    Orientation Status (Cognition)  oriented x 4  -CS       User Key  (r) = Recorded By, (t) = Taken By, (c) = Cosigned By    Initials Name Provider Type    CS David Tesfaye OT Occupational Therapist          Mobility/ADL's     Row Name 04/12/21 0823          Bed Mobility    Bed Mobility  rolling right;sidelying-sit  -CS     Rolling Right [place] (Bed Mobility)  independent  -CS     Sidelying-Sit [place] (Bed Mobility)  independent  -CS     Comment (Bed Mobility)  HOB and bed rails lowered to simulate home environment  -CS     Row Name 04/12/21 0823          Transfers    Transfers  bed-chair transfer;sit-stand transfer;toilet transfer  -CS     Bed-Chair [place] (Transfers)  independent  -CS     Sit-Stand [place] (Transfers)  independent  -CS     [place] Level (Toilet Transfer)  independent  -CS     Row Name 04/12/21 0823          Toilet Transfer    Type (Toilet Transfer)  stand-sit;sit-stand  -CS     Row Name  04/12/21 0823          Functional Mobility    Functional Mobility- Comment  Independent for in-room and short hallway distance w/ no AD or LOB, VSS on RA  -CS     Row Name 04/12/21 0823          Activities of Daily Living    BADL Assessment/Intervention  upper body dressing;lower body dressing;grooming;toileting  -CS     Row Name 04/12/21 0823          Upper Body Dressing Assessment/Training    [place] Level (Upper Body Dressing)  don;pajama/robe;independent  -CS     Position (Upper Body Dressing)  edge of bed sitting  -CS     Row Name 04/12/21 0823          Lower Body Dressing Assessment/Training    [place] Level (Lower Body Dressing)  doff;don;socks;independent  -CS     Position (Lower Body Dressing)  edge of bed sitting  -CS     Row Name 04/12/21 0823          Grooming Assessment/Training    [place] Level (Grooming)  hair care, combing/brushing;wash face, hands;independent  -CS     Position (Grooming)  sink side;unsupported standing  -CS     Row Name 04/12/21 0823          Toileting Assessment/Training    [place] Level (Toileting)  adjust/manage clothing;perform perineal hygiene;independent  -CS     Position (Toileting)  unsupported sitting  -CS       User Key  (r) = Recorded By, (t) = Taken By, (c) = Cosigned By    Initials Name Provider Type     David Tesfaye OT Occupational Therapist        Obj/Interventions     Row Name 04/12/21 0826          Sensory Assessment (Somatosensory)    Sensory Assessment (Somatosensory)  UE sensation intact;other (see comments) Pt identified all light touch stimuli in bilateral hands w/ vision occluded  -     Row Name 04/12/21 0826          Vision Assessment/Intervention    Visual Impairment/Limitations  WFL;corrective lenses for reading;other (see comments) remote bilateral cataract surgery  -     Row Name 04/12/21 0826          Range of Motion Comprehensive    General Range of Motion  bilateral upper extremity ROM WFL  -     Row Name 04/12/21 0826           Strength Comprehensive (MMT)    General Manual Muscle Testing (MMT) Assessment  no strength deficits identified  -     Row Name 04/12/21 0826          Motor Skills    Motor Skills  coordination  -     Coordination  finger to nose;WFL  -     Row Name 04/12/21 0826          Balance    Balance Assessment  sitting static balance;sitting dynamic balance;standing static balance;standing dynamic balance  -     Static Sitting Balance  WFL;unsupported;sitting, edge of bed  -CS     Dynamic Sitting Balance  WFL;sitting, edge of bed;unsupported  -CS     Static Standing Balance  WFL;standing;unsupported  -CS     Dynamic Standing Balance  WFL;standing;unsupported  -CS     Comment, Balance  no LOB during sitting/standing ADL completion  -       User Key  (r) = Recorded By, (t) = Taken By, (c) = Cosigned By    Initials Name Provider Type    David Granda, OT Occupational Therapist        Goals/Plan    No documentation.       Clinical Impression     Row Name 04/12/21 0830          Pain Assessment    Additional Documentation  Pain Scale: Numbers Pre/Post-Treatment (Group)  -CS     Row Name 04/12/21 0830          Pain Scale: Numbers Pre/Post-Treatment    Pretreatment Pain Rating  0/10 - no pain  -CS     Posttreatment Pain Rating  0/10 - no pain  -CS     Pre/Posttreatment Pain Comment  tolerated eval  -     Row Name 04/12/21 0830          Plan of Care Review    Plan of Care Reviewed With  patient  -CS     Progress  improving  -     Outcome Summary  Initial OT evaluation completed. Pt does not present w/ deficits warranting skilled OT services on this date. Pt demonstrated independence in bed mobility, in-room functional mobilty, LB dressing, sinkside ADLs, and toileting tasks. Pt w/ symmetrical UE strength and coordination WFL. Recommend d/c to home w/ assist if needed.  -     Row Name 04/12/21 0830          Therapy Assessment/Plan (OT)    Criteria for Skilled Therapeutic Interventions Met (OT)  no;no  problems identified which require skilled intervention  -CS     Row Name 04/12/21 0830          Therapy Plan Review/Discharge Plan (OT)    Anticipated Discharge Disposition (OT)  home;home with assist  -CS     Row Name 04/12/21 0830          Vital Signs    Pre Systolic BP Rehab  -- RN cleared for eval VSS on RA  -CS     Post Systolic BP Rehab  122  -CS     Post Treatment Diastolic BP  77  -CS     O2 Delivery Pre Treatment  room air  -CS     O2 Delivery Intra Treatment  room air  -CS     O2 Delivery Post Treatment  room air  -CS     Pre Patient Position  Supine  -CS     Intra Patient Position  Standing  -CS     Post Patient Position  Sitting  -CS     Row Name 04/12/21 0830          Positioning and Restraints    Pre-Treatment Position  in bed  -CS     Post Treatment Position  chair  -CS     In Chair  notified nsg;reclined;call light within reach;encouraged to call for assist;legs elevated no exit alarm per nsg  -CS       User Key  (r) = Recorded By, (t) = Taken By, (c) = Cosigned By    Initials Name Provider Type    David Granda OT Occupational Therapist        Outcome Measures     Row Name 04/12/21 0834          How much help from another is currently needed...    Putting on and taking off regular lower body clothing?  4  -CS     Bathing (including washing, rinsing, and drying)  4  -CS     Toileting (which includes using toilet bed pan or urinal)  4  -CS     Putting on and taking off regular upper body clothing  4  -CS     Taking care of personal grooming (such as brushing teeth)  4  -CS     Eating meals  4  -CS     AM-PAC 6 Clicks Score (OT)  24  -CS     Row Name 04/12/21 0834          Functional Assessment    Outcome Measure Options  AM-PAC 6 Clicks Daily Activity (OT)  -CS       User Key  (r) = Recorded By, (t) = Taken By, (c) = Cosigned By    Initials Name Provider Type    David Granda OT Occupational Therapist        Occupational Therapy Education                 Title: PT OT SLP Therapies (In  Progress)     Topic: Occupational Therapy (In Progress)     Point: ADL training (Not Started)     Description:   Instruct learner(s) on proper safety adaptation and remediation techniques during self care or transfers.   Instruct in proper use of assistive devices.              Learner Progress:  Not documented in this visit.          Point: Home exercise program (Not Started)     Description:   Instruct learner(s) on appropriate technique for monitoring, assisting and/or progressing therapeutic exercises/activities.              Learner Progress:  Not documented in this visit.          Point: Precautions (Done)     Description:   Instruct learner(s) on prescribed precautions during self-care and functional transfers.              Learning Progress Summary           Patient Acceptance, E,D, DU,VU by  at 4/12/2021 0834    Comment: OT role and POC, in-room safety awareness                   Point: Body mechanics (Not Started)     Description:   Instruct learner(s) on proper positioning and spine alignment during self-care, functional mobility activities and/or exercises.              Learner Progress:  Not documented in this visit.                      User Key     Initials Effective Dates Name Provider Type Discipline     10/21/20 -  David Tesfaye OT Occupational Therapist OT              OT Recommendation and Plan     Plan of Care Review  Plan of Care Reviewed With: patient  Progress: improving  Outcome Summary: Initial OT evaluation completed. Pt does not present w/ deficits warranting skilled OT services on this date. Pt demonstrated independence in bed mobility, in-room functional mobilty, LB dressing, sinkside ADLs, and toileting tasks. Pt w/ symmetrical UE strength and coordination WFL. Recommend d/c to home w/ assist if needed.     Time Calculation:   Time Calculation- OT     Row Name 04/12/21 0841             Time Calculation- OT    OT Start Time  0746  -      OT Received On  04/12/21  -CHATO         User Key  (r) = Recorded By, (t) = Taken By, (c) = Cosigned By    Initials Name Provider Type    CS David Tesfaye, OT Occupational Therapist        Therapy Charges for Today     Code Description Service Date Service Provider Modifiers Qty    06084288577  OT EVAL LOW COMPLEXITY 4 4/12/2021 David Tesfaye OT GO 1               David Tesfaye OT  4/12/2021

## 2021-04-12 NOTE — MBS/VFSS/FEES
Acute Care - Speech Language Pathology   Swallow Initial Evaluation  [facility]   Modified Barium Swallow Study (MBS)     Patient Name: Delphine Dsa  : 1960  MRN: [id number]  Today's Date: 2021               Admit Date: 4/10/2021    Visit Dx:     ICD-10-CM ICD-9-CM   1. Acute alteration in mental status  R41.82 780.97   2. Numbness and tingling of upper and lower extremities of both sides  R20.0 782.0    R20.2    3. Expressive aphasia  R47.01 784.3     Patient Active Problem List   Diagnosis   • Chest pain with normal angiography   • Dyspnea on exertion   • Laryngitis   • PFO (patent foramen ovale) : Suspected with atrial level shunt on bubble study echo 2020.   • Migraines   • Vocal cord dysfunction - suspected.    • Hiatal hernia   • GERD (gastroesophageal reflux disease)   • Hoarse voice quality   • Gastroesophageal reflux disease   • Mixed hyperlipidemia   • Acute alteration in mental status   • Numbness   • Dizziness   • Difficulty speaking   • DIXON (acute kidney injury) (CMS/HCC)     Past Medical History:   Diagnosis Date   • Asthma    • Colon polyp    • GERD (gastroesophageal reflux disease)    • Hiatal hernia    • Hyperlipidemia    • Hypertension    • Migraine    • PFO (patent foramen ovale)      Past Surgical History:   Procedure Laterality Date   • CARDIAC CATHETERIZATION N/A 2021    Procedure: LEFT HEART CATH;  Surgeon: Adelso Burnett IV, MD;  Location:  [facility];  Service: Cardiovascular;  Laterality: N/A;   • CARDIAC CATHETERIZATION N/A 2021    Procedure: Coronary angiography;  Surgeon: Adelso Burnett IV, MD;  Location:  [facility];  Service: Cardiovascular;  Laterality: N/A;   • COLONOSCOPY     • GASTRIC PH MONITORING 24HR N/A 2020    Procedure: GASTRIC PH MONITORING 24HR;  Surgeon: Cliff Sheffield MD;  Location:  [facility];  Service: General;  Laterality: N/A;  Probe successfully placed 25 at left  [place]   • UPPER GASTROINTESTINAL ENDOSCOPY          SWALLOW EVALUATION (last 72 hours)      SLP Adult Swallow Evaluation     Row Name 04/12/21 1015 04/11/21 1000                Rehab Evaluation    Document Type  evaluation  -  evaluation  -       Subjective Information  no complaints  -  no complaints  -CJ       Patient Observations  alert;cooperative  -AC  alert;cooperative  -       Patient/Family/Caregiver Comments/Observations  No family present.  -  no family present  -       Care Plan Review  --  evaluation/treatment results reviewed;patient/other agree to care plan  -       Patient Effort  good  -  good  -       Symptoms Noted During/After Treatment  --  none  -          General Information    Patient Profile Reviewed  yes  -AC  yes  -CJ       Pertinent History Of Current Problem  See previous eval.  -AC  Pt adm w/ possible CVA; Pt reports difficulty swallowing w/ globus sensation. Pt has sig h/o vocal hoarseness, ams, numbness, DIXON, laryngitis, PFO, hiatal hernia, VF dysfunction. Pt reports initially had expressive aphasia this has since resolved.   -       Current Method of Nutrition  regular textures;thin liquids  -  regular textures;thin liquids  -       Precautions/Limitations, Vision  --  WFL  -CJ       Precautions/Limitations, Hearing  --  WFL;for purposes of eval  -       Prior Level of Function-Communication  --  WFL  -       Prior Level of Function-Swallowing  --  no diet consistency restrictions;safe, efficient swallowing in all situations  -       Plans/Goals Discussed with  patient;agreed upon  -  patient;agreed upon  -       Barriers to Rehab  none identified  -  none identified  -       Patient's Goals for Discharge  patient did not state  -  patient did not state  -          Pain    Additional Documentation  --  Pain Scale: FACES Pre/Post-Treatment (Group)  -          Pain Scale: FACES Pre/Post-Treatment    Pain: FACES Scale, Pretreatment  0-->no  hurt  -AC  0-->no hurt  -CJ       Posttreatment Pain Rating  0-->no hurt  -AC  0-->no hurt  -CJ          Oral Motor Structure and Function    Dentition Assessment  --  natural, present and adequate  -       Secretion Management  --  WNL/WFL  -CJ       Mucosal Quality  --  moist, healthy  -       Volitional Swallow  --  WFL  -       Volitional Cough  --  WFL  -          Oral Musculature and Cranial Nerve Assessment    Oral Motor General Assessment  --  WFL  -          General Eating/Swallowing Observations    Respiratory Support Currently in Use  room air  -  room air  -       Eating/Swallowing Skills  fed by SLP;self-fed;appropriate self-feeding skills observed  -  self-fed  -       Positioning During Eating  upright 90 degree;upright in chair  -  upright 90 degree;upright in bed  -       Utensils Used  --  spoon;cup;straw  -       Consistencies Trialed  --  regular textures;ice chips;thin liquids;pureed  -          Respiratory    Respiratory Status  --  WFL  -          Clinical Swallow Eval    Oral Prep Phase  --  L  -       Oral Transit  --  WFL  -       Oral Residue  --  WFL  -       Pharyngeal Phase  --  no overt signs/symptoms of pharyngeal impairment  -CJ       Esophageal Phase  --  suspected esophageal impairment  -       Clinical Swallow Evaluation Summary  --  CSE completed this am. Ms. Das is positioned upright and centered in bed to accept po presentations of puree, ice chips, regular solids, and thin liquids via spoon, cup and straw. Pt reports ongoing globus sensation which causes a habitual throat clear intermittently. Oral phase is seemingly wfl. NO overt s/s of aspiration. No clinical s/s concerning for silent aspiration as pt is w/o changes in vocal quality respirations or secretions post po presentations. Per this evaluation Ms. Das presents w/ seemingly wfl oropharyngeal swallowing fnx however given pt's report of consistent globus sensation. She may benefit  from MBS w/ limited upper GI. Will plan for this tomorrow if pt hasn't been discharged. If pt were to d/c before tomorrow can be completed as outpatient. She is agreeable to this. Please continue MD ordered po diet of regular w/ thin liquids.  -          Esophageal Phase Concerns    Esophageal Phase Concerns  --  globus  -       Globus  --  all consistencies  -CJ          MBS/VFSS    Utensils Used  spoon;cup;straw  -AC  --       Consistencies Trialed  thin liquids;pudding thick;regular textures  -AC  --          MBS/VFSS Interpretation    Oral Prep Phase  WFL  -AC  --       Oral Transit Phase  WFL  -AC  --       Oral Residue  WFL  -AC  --          Initiation of Pharyngeal Swallow    Initiation of Pharyngeal Swallow  WFL  -AC  --       Pharyngeal Phase  functional pharyngeal phase of swallowing  -AC  --       Rosenbek's Scale  all consistencies:;1--->level 1  -AC  --       Pharyngeal Phase, Comment  Functional oropharyngeal swallow. No penetration, aspiration, or significant pharyngeal residue noted. Of note, pt exhibited coughing following trials of pudding and elliott cracker coated in pudding; however, no aspiration or safety concerns appreciated.  -AC  --          Esophageal Phase    Esophageal Phase  no impairments;see radiology report for further details  -AC  --          Clinical Impression    SLP Swallowing Diagnosis  functional oral phase;functional pharyngeal phase  -AC  R/O pharyngeal dysphagia;esophageal dysphagia  -       Functional Impact  --  risk of aspiration/pneumonia  -       Rehab Potential/Prognosis, Swallowing  --  good, to achieve stated therapy goals  -       Swallow Criteria for Skilled Therapeutic Interventions Met  no problems identified which require skilled intervention  -AC  demonstrates skilled criteria  -       Plan for Continued Treatment (SLP)  --  Plan for MBS w/ limited UGI for tomorrow if pt hasn't discharged. If pt were to discharge today can be completed as an  outpatient.  -          Recommendations    Predicted Duration Therapy Intervention (Days)  --  until discharge  -       SLP Diet Recommendation  regular textures;thin liquids  -  regular textures;thin liquids;other (see comments) MD ordered po diet  -       Recommended Diagnostics  --  reassess via VFSS ([facility]);other (see comments) + limited UGI 4/12/2021  -       Recommended Precautions and Strategies  upright posture during/after eating;general aspiration precautions;reflux precautions  -  upright posture during/after eating;general aspiration precautions;reflux precautions  -       Oral Care Recommendations  Oral Care BID/PRN  -  Oral Care BID/PRN  -       SLP Rec. for Method of Medication Administration  meds whole;with thin liquids;with pudding or applesauce;as tolerated  -  meds whole;with pudding or applesauce  -       Monitor for Signs of Aspiration  --  yes;notify SLP if any concerns  -       Anticipated Discharge Disposition (SLP)  --  unknown;anticipate therapy at next level of care  -         User Key  (r) = Recorded By, (t) = Taken By, (c) = Cosigned By    Initials Name Effective Dates     Ana Maria Cruz, MS CCC-SLP 07/27/17 -     Kerry Kelly, MS CCC-SLP 02/11/20 -           EDUCATION  The patient has been educated in the following areas:   Oral Care/Hydration.    SLP Recommendation and Plan  SLP Swallowing Diagnosis: functional oral phase, functional pharyngeal phase  SLP Diet Recommendation: regular textures, thin liquids  Recommended Precautions and Strategies: upright posture during/after eating, general aspiration precautions, reflux precautions  SLP Rec. for Method of Medication Administration: meds whole, with thin liquids, with pudding or applesauce, as tolerated      Swallow Criteria for Skilled Therapeutic Interventions Met: no problems identified which require skilled intervention           Plan of Care Reviewed With: patient  Progress: no change       Time  Calculation:   Time Calculation- SLP     Row Name 04/12/21 1052             Time Calculation- SLP    SLP Start Time  1015  -      SLP Received On  04/12/21  -        User Key  (r) = Recorded By, (t) = Taken By, (c) = Cosigned By    Initials Name Provider Type    Ana Maria Leyva MS CCC-SLP Speech and Language Pathologist          Therapy Charges for Today     Code Description Service Date Service Provider Modifiers Qty    [id number] HC ST MOTION FLUORO EVAL SWALLOW 3 4/12/2021 Ana Maria Cruz MS CCC-SLP GN 1        Patient was not wearing a face mask and did exhibit coughing during this therapy encounter.  Procedure performed was aerosolizing, involved close contact (within 6 feet for at least 15 minutes or longer), and did not involve contact with infectious secretions or specimens.  Therapist used appropriate personal protective equipment including gloves, standard procedure mask and eye protection.  Appropriate PPE was worn during the entire therapy session.  Hand hygiene was completed before and after therapy session.          MS GEORGETTE RowlandSLP  4/12/2021

## 2021-04-12 NOTE — PLAN OF CARE
Goal Outcome Evaluation:  Plan of Care Reviewed With: patient  Progress: no change   SLP evaluation completed. Will sign-off as MBS revealed functional oropharyngeal swallow and no further swallow intervention indicated. Please see note for further details and recommendations.

## 2021-04-12 NOTE — DISCHARGE INSTR - DIET
MBS/VFSS Reason for Referral 4/12/2021  Patient was referred for a MBS to assess the efficiency of his/her swallow function, rule out aspiration and make recommendations regarding safe dietary consistencies, effective compensatory strategies, and safe eating environment.             Recommendations/Treatment  SLP Swallowing Diagnosis: functional oral phase, functional pharyngeal phase  Swallow Criteria for Skilled Therapeutic Interventions Met: no problems identified which require skilled intervention  SLP Diet Recommendation: regular textures, thin liquids  Recommended Precautions and Strategies: upright posture during/after eating, general aspiration precautions, reflux precautions  SLP Rec. for Method of Medication Administration: meds whole, with thin liquids, with pudding or applesauce, as tolerated    Instrumental Set-up  Utensils Used: spoon, cup, straw  Consistencies Trialed: thin liquids, pudding thick, regular textures    Oral Preparation/ Oral Phase  Oral Prep Phase: WFL  Oral Transit Phase: WFL  Oral Residue: WFL             Pharyngeal Phase  Initiation of Pharyngeal Swallow: WFL  Pharyngeal Phase: functional pharyngeal phase of swallowing  Pharyngeal Phase, Comment: Functional oropharyngeal swallow. No penetration, aspiration, or significant pharyngeal residue noted. Of note, pt exhibited coughing following trials of pudding and elliott cracker coated in pudding; however, no aspiration or safety concerns appreciated.    Cervical Esophageal Phase  Esophageal Phase: no impairments, see radiology report for further details

## 2021-04-12 NOTE — THERAPY DISCHARGE NOTE
Acute Care - Occupational Therapy Discharge  [facility]    Patient Name: Delphine Das  : 1960    MRN: [id number]                              Today's Date: 2021       Admit Date: 4/10/2021    Visit Dx:     ICD-10-CM ICD-9-CM   1. Acute alteration in mental status  R41.82 780.97   2. Numbness and tingling of upper and lower extremities of both sides  R20.0 782.0    R20.2    3. Expressive aphasia  R47.01 784.3     Patient Active Problem List   Diagnosis   • Chest pain with normal angiography   • Dyspnea on exertion   • Laryngitis   • PFO (patent foramen ovale) : Suspected with atrial level shunt on bubble study echo 2020.   • Migraines   • Vocal cord dysfunction - suspected.    • Hiatal hernia   • GERD (gastroesophageal reflux disease)   • Hoarse voice quality   • Gastroesophageal reflux disease   • Mixed hyperlipidemia   • Acute alteration in mental status   • Numbness   • Dizziness   • Difficulty speaking   • DIXON (acute kidney injury) (CMS/HCC)     Past Medical History:   Diagnosis Date   • Asthma    • Colon polyp    • GERD (gastroesophageal reflux disease)    • Hiatal hernia    • Hyperlipidemia    • Hypertension    • Migraine    • PFO (patent foramen ovale)      Past Surgical History:   Procedure Laterality Date   • CARDIAC CATHETERIZATION N/A 2021    Procedure: LEFT HEART CATH;  Surgeon: Adelso Burnett IV, MD;  Location:  [facility];  Service: Cardiovascular;  Laterality: N/A;   • CARDIAC CATHETERIZATION N/A 2021    Procedure: Coronary angiography;  Surgeon: Adelso Burnett IV, MD;  Location:  [facility];  Service: Cardiovascular;  Laterality: N/A;   • COLONOSCOPY     • GASTRIC PH MONITORING 24HR N/A 2020    Procedure: GASTRIC PH MONITORING 24HR;  Surgeon: Cliff Sheffield MD;  Location:  [facility];  Service: General;  Laterality: N/A;  Probe successfully placed 25 at left nare   • UPPER GASTROINTESTINAL  ENDOSCOPY       General Information     Row Name 04/12/21 0820          OT Time and Intention    Document Type  discharge evaluation/summary  -CS     Mode of Treatment  occupational therapy  -CS     Row Name 04/12/21 0820          General Information    Patient Profile Reviewed  yes  -CS     Prior Level of Function  independent:;all household mobility;ADL's;community mobility;home management;driving No AD or DME at baseline  -CS     Barriers to Rehab  none identified  -CS     Row Name 04/12/21 0820          Living Environment    Lives With  spouse  -CS     Row Name 04/12/21 0820          Home Main Entrance    Number of Stairs, Main Entrance  other (see comments)  -CS     Row Name 04/12/21 0820          Stairs Within Home, Primary    Stairs, Within Home, Primary  15 steps from basement to enter home, laundry in basement  -CS     Number of Stairs, Within Home, Primary  other (see comments)  -CS     Stair Railings, Within Home, Primary  railing on left side (ascending);railings safe and in good condition  -CS     Row Name 04/12/21 0820          Cognition    Orientation Status (Cognition)  oriented x 4  -CS       User Key  (r) = Recorded By, (t) = Taken By, (c) = Cosigned By    Initials Name Provider Type    CS David Tesfaye, OT Occupational Therapist        Mobility/ADL's     Row Name 04/12/21 0823          Bed Mobility    Bed Mobility  rolling right;sidelying-sit  -CS     Rolling Right [place] (Bed Mobility)  independent  -CS     Sidelying-Sit [place] (Bed Mobility)  independent  -CS     Comment (Bed Mobility)  HOB and bed rails lowered to simulate home environment  -CS     Row Name 04/12/21 0823          Transfers    Transfers  bed-chair transfer;sit-stand transfer;toilet transfer  -CS     Bed-Chair [place] (Transfers)  independent  -CS     Sit-Stand [place] (Transfers)  independent  -CS     [place] Level (Toilet Transfer)  independent  -CS     Row Name 04/12/21 0823          Toilet  Transfer    Type (Toilet Transfer)  stand-sit;sit-stand  -     Row Name 04/12/21 0823          Functional Mobility    Functional Mobility- Comment  Independent for in-room and short hallway distance w/ no AD or LOB, VSS on RA  -     Row Name 04/12/21 0823          Activities of Daily Living    BADL Assessment/Intervention  upper body dressing;lower body dressing;grooming;toileting  -     Row Name 04/12/21 0823          Upper Body Dressing Assessment/Training    [place] Level (Upper Body Dressing)  don;pajama/robe;independent  -CS     Position (Upper Body Dressing)  edge of bed sitting  -CS     Row Name 04/12/21 0823          Lower Body Dressing Assessment/Training    [place] Level (Lower Body Dressing)  doff;don;socks;independent  -CS     Position (Lower Body Dressing)  edge of bed sitting  -CS     Row Name 04/12/21 0823          Grooming Assessment/Training    [place] Level (Grooming)  hair care, combing/brushing;wash face, hands;independent  -CS     Position (Grooming)  sink side;unsupported standing  -     Row Name 04/12/21 0823          Toileting Assessment/Training    [place] Level (Toileting)  adjust/manage clothing;perform perineal hygiene;independent  -CS     Position (Toileting)  unsupported sitting  -CS       User Key  (r) = Recorded By, (t) = Taken By, (c) = Cosigned By    Initials Name Provider Type    David Granda, OT Occupational Therapist        Obj/Interventions     Row Name 04/12/21 0826          Sensory Assessment (Somatosensory)    Sensory Assessment (Somatosensory)  UE sensation intact;other (see comments) Pt identified all light touch stimuli in bilateral hands w/ vision occluded  -     Row Name 04/12/21 0826          Vision Assessment/Intervention    Visual Impairment/Limitations  WFL;corrective lenses for reading;other (see comments) remote bilateral cataract surgery  -     Row Name 04/12/21 0826          Range of Motion Comprehensive    General Range of  Motion  bilateral upper extremity ROM WFL  -CS     Row Name 04/12/21 0826          Strength Comprehensive (MMT)    General Manual Muscle Testing (MMT) Assessment  no strength deficits identified  -[facility] Name 04/12/21 0826          Motor Skills    Motor Skills  coordination  -     Coordination  finger to nose;WFL  -CS     Row Name 04/12/21 0826          Balance    Balance Assessment  sitting static balance;sitting dynamic balance;standing static balance;standing dynamic balance  -     Static Sitting Balance  WFL;unsupported;sitting, edge of bed  -     Dynamic Sitting Balance  WFL;sitting, edge of bed;unsupported  -     Static Standing Balance  WFL;standing;unsupported  -     Dynamic Standing Balance  WFL;standing;unsupported  -     Comment, Balance  no LOB during sitting/standing ADL completion  -       User Key  (r) = Recorded By, (t) = Taken By, (c) = Cosigned By    Initials Name Provider Type    CS David Tesfaye, OT Occupational Therapist        Goals/Plan    No documentation.       Clinical Impression     Row Name 04/12/21 0830          Pain Assessment    Additional Documentation  Pain Scale: Numbers Pre/Post-Treatment (Group)  -CS     Row Name 04/12/21 0830          Pain Scale: Numbers Pre/Post-Treatment    Pretreatment Pain Rating  0/10 - no pain  -CS     Posttreatment Pain Rating  0/10 - no pain  -CS     Pre/Posttreatment Pain Comment  tolerated eval  -CS     Row Name 04/12/21 0830          Plan of Care Review    Plan of Care Reviewed With  patient  -     Progress  improving  -     Outcome Summary  Initial OT evaluation completed. Pt does not present w/ deficits warranting skilled OT services on this date. Pt demonstrated independence in bed mobility, in-room functional mobilty, LB dressing, sinkside ADLs, and toileting tasks. Pt w/ symmetrical UE strength and coordination WFL. Recommend d/c to home w/ assist if needed.  -     Row Name 04/12/21 0830          Therapy Assessment/Plan  (OT)    Criteria for Skilled Therapeutic Interventions Met (OT)  no;no problems identified which require skilled intervention  -CS     Row Name 04/12/21 0830          Therapy Plan Review/Discharge Plan (OT)    Anticipated Discharge Disposition (OT)  home;home with assist  -CS     Row Name 04/12/21 0830          Vital Signs    Pre Systolic BP Rehab  -- RN cleared for eval, VSS on RA  -CS     Post Systolic BP Rehab  122  -CS     Post Treatment Diastolic BP  77  -CS     O2 Delivery Pre Treatment  room air  -CS     O2 Delivery Intra Treatment  room air  -CS     O2 Delivery Post Treatment  room air  -CS     Pre Patient Position  Supine  -CS     Intra Patient Position  Standing  -CS     Post Patient Position  Sitting  -CS     Row Name 04/12/21 0830          Positioning and Restraints    Pre-Treatment Position  in bed  -CS     Post Treatment Position  chair  -CS     In Chair  notified nsg;reclined;call light within reach;encouraged to call for assist;legs elevated no exit alarm per nsg  -CS       User Key  (r) = Recorded By, (t) = Taken By, (c) = Cosigned By    Initials Name Provider Type    David Granda OT Occupational Therapist        Outcome Measures     Row Name 04/12/21 0834          How much help from another is currently needed...    Putting on and taking off regular lower body clothing?  4  -CS     Bathing (including washing, rinsing, and drying)  4  -CS     Toileting (which includes using toilet bed pan or urinal)  4  -CS     Putting on and taking off regular upper body clothing  4  -CS     Taking care of personal grooming (such as brushing teeth)  4  -CS     Eating meals  4  -CS     AM-PAC 6 Clicks Score (OT)  24  -CS     Row Name 04/12/21 0834          Functional Assessment    Outcome Measure Options  AM-PAC 6 Clicks Daily Activity (OT)  -CS       User Key  (r) = Recorded By, (t) = Taken By, (c) = Cosigned By    Initials Name Provider Type    David Granda OT Occupational Therapist         Occupational Therapy Education                 Title: PT OT SLP Therapies (In Progress)     Topic: Occupational Therapy (In Progress)     Point: ADL training (Not Started)     Description:   Instruct learner(s) on proper safety adaptation and remediation techniques during self care or transfers.   Instruct in proper use of assistive devices.              Learner Progress:  Not documented in this visit.          Point: Home exercise program (Not Started)     Description:   Instruct learner(s) on appropriate technique for monitoring, assisting and/or progressing therapeutic exercises/activities.              Learner Progress:  Not documented in this visit.          Point: Precautions (Done)     Description:   Instruct learner(s) on prescribed precautions during self-care and functional transfers.              Learning Progress Summary           Patient Acceptance, E,D, DU,VU by CHATO at 4/12/2021 0834    Comment: OT role and POC, in-room safety awareness                   Point: Body mechanics (Not Started)     Description:   Instruct learner(s) on proper positioning and spine alignment during self-care, functional mobility activities and/or exercises.              Learner Progress:  Not documented in this visit.                      User Key     Initials Effective Dates Name Provider Type Discipline    CHATO 10/21/20 -  David Tesfaye OT Occupational Therapist OT              OT Recommendation and Plan  Retired Outcome Summary/Treatment Plan (OT)  Anticipated Discharge Disposition (OT): home, home with assist  Plan of Care Review  Plan of Care Reviewed With: patient  Progress: improving  Outcome Summary: Initial OT evaluation completed. Pt does not present w/ deficits warranting skilled OT services on this date. Pt demonstrated independence in bed mobility, in-room functional mobilty, LB dressing, sinkside ADLs, and toileting tasks. Pt w/ symmetrical UE strength and coordination WFL. Recommend d/c to home w/ assist  if needed.  Plan of Care Reviewed With: patient  Outcome Summary: Initial OT evaluation completed. Pt does not present w/ deficits warranting skilled OT services on this date. Pt demonstrated independence in bed mobility, in-room functional mobilty, LB dressing, sinkside ADLs, and toileting tasks. Pt w/ symmetrical UE strength and coordination WFL. Recommend d/c to home w/ assist if needed.     Time Calculation:   Time Calculation- OT     Row Name 04/12/21 0841             Time Calculation- OT    OT Start Time  0746  -      OT Received On  04/12/21  -        User Key  (r) = Recorded By, (t) = Taken By, (c) = Cosigned By    Initials Name Provider Type    CS David Tesfaye, OT Occupational Therapist        Therapy Charges for Today     Code Description Service Date Service Provider Modifiers Qty    [id number]  OT EVAL LOW COMPLEXITY 4 4/12/2021 David Tesfaye OT GO 1               David Tesfaye OT  4/12/2021

## 2021-04-13 ENCOUNTER — TRANSITIONAL CARE MANAGEMENT TELEPHONE ENCOUNTER (OUTPATIENT)
Dept: CALL CENTER | Facility: HOSPITAL | Age: 61
End: 2021-04-13

## 2021-04-13 NOTE — OUTREACH NOTE
Call Center TCM Note      Responses   [facility] patient discharged from?  [facility]   Does the patient have one of the following disease processes/diagnoses(primary or secondary)?  Other   TCM attempt successful?  Yes   Call start time  1144   Call end time  1151   Discharge diagnosis  Acute alteration in mental status, DIXON, Hs. Migranes   Is patient permission given to speak with other caregiver?  Yes   Person spoke with today (if not patient) and relationship   Aubrey Rose reviewed with patient/caregiver?  Yes   Is the patient having any side effects they believe may be caused by any medication additions or changes?  No   Does the patient have all medications ordered at discharge?  Yes   Is the patient taking all medications as directed (includes completed medication regime)?  Yes   Does the patient have a primary care provider?   Yes   Does the patient have an appointment with their PCP within 7 days of discharge?  Yes   Comments regarding PCP  Patient has a new PCP hospital followup scheduled with ALFA ARAYA  on 4/16/2021   Has the patient kept scheduled appointments due by today?  N/A   Has home health visited the patient within 72 hours of discharge?  N/A   Psychosocial issues?  No   Did the patient receive a copy of their discharge instructions?  Yes   Nursing interventions  Reviewed instructions with patient   What is the patient's perception of their health status since discharge?  Improving   Is the patient/caregiver able to teach back signs and symptoms related to disease process for when to call PCP?  Yes   Is the patient/caregiver able to teach back signs and symptoms related to disease process for when to call 911?  Yes   Is the patient/caregiver able to teach back the hierarchy of who to call/visit for symptoms/problems? PCP, Specialist, Home health nurse, Urgent Care, ED, 911  Yes   If the patient is a current smoker, are they able to teach back resources for cessation?  Not a smoker   TCM  call completed?  Yes          Sunday Reyes RN    4/13/2021, 11:51 EDT

## 2021-04-15 ENCOUNTER — OFFICE VISIT (OUTPATIENT)
Dept: NEUROLOGY | Facility: CLINIC | Age: 61
End: 2021-04-15

## 2021-04-15 VITALS
OXYGEN SATURATION: 95 % | TEMPERATURE: 97.8 F | DIASTOLIC BLOOD PRESSURE: 80 MMHG | WEIGHT: 190 LBS | SYSTOLIC BLOOD PRESSURE: 118 MMHG | BODY MASS INDEX: 27.2 KG/M2 | HEIGHT: 70 IN | HEART RATE: 100 BPM

## 2021-04-15 DIAGNOSIS — G43.019 INTRACTABLE MIGRAINE WITHOUT AURA AND WITHOUT STATUS MIGRAINOSUS: Primary | ICD-10-CM

## 2021-04-15 PROCEDURE — 99215 OFFICE O/P EST HI 40 MIN: CPT | Performed by: PHYSICIAN ASSISTANT

## 2021-04-16 NOTE — PROGRESS NOTES
"Subjective       Chief Complaint: left sided numbness, confusion      History of Present Illness   Delphine Das is a 60 y.o. female who comes to clinic today following a hospitalization at [facility] from 4/10/21-4/12/21 for left sided numbness and confusion. Prior to her admission, she was eating lunch with her daughter when she noted sudden onset left foot numbness. This spread up her left side then throughout her whole body, from the neck down. There was an associated sensation of generalized weakness, confusion, and difficulty speaking. She also reported left arm stiffness. This lasted for approximately 20 minutes before improvement. She reports that she had a headache prior to the spell as well as lightheadedness. Upon presentation, her systolic blood pressure was in the 90's. MRI of the brain and MRA of the head and neck were unremarkable.     She reports a long history of migraines marked by sharp frontal headaches. This is nearly daily. There is associated nausea and phonophobia. Her She has been followed at [facility] for several years. She receives a weekly infusion (containing VPA and benadryl) and is undergoing Botox therapy. She is currently taking Ajovy, TPM 50mg BID, amitriptyline 50mg nightly and Keppra 500mg BID.        I have reviewed and confirmed the past family, social and medical history as accurate on 4/16/21.     Review of Systems   Constitutional: Negative.    HENT: Negative.    Eyes: Negative.    Respiratory: Negative.    Cardiovascular: Negative.    Gastrointestinal: Negative.    Endocrine: Negative.    Genitourinary: Negative.    Musculoskeletal: Negative.    Skin: Negative.    Allergic/Immunologic: Negative.    Hematological: Negative.        Objective     /80   Pulse 100   Temp 97.8 °F (36.6 °C)   Ht 177 cm (69.69\")   Wt 86.2 kg (190 lb)   SpO2 95%   BMI 27.51 kg/m²     General appearance today is normal.     Physical Exam  Neurological:      Coordination: " Finger-Nose-Finger Test and Heel to Arita Test normal.      Gait: Gait is intact.      Deep Tendon Reflexes: Strength normal.      Reflex Scores:       Patellar reflexes are 2+ on the right side and 2+ on the left side.  Psychiatric:         Speech: Speech normal.          Neurologic Exam     Mental Status   Speech: speech is normal   Level of consciousness: alert  Normal comprehension.     Cranial Nerves   Cranial nerves II through XII intact.     Motor Exam   Muscle bulk: normal  Overall muscle tone: normal    Strength   Strength 5/5 throughout.     Sensory Exam   Light touch normal.     Gait, Coordination, and Reflexes     Gait  Gait: normal    Coordination   Finger to nose coordination: normal  Heel to shin coordination: normal    Tremor   Resting tremor: absent    Reflexes   Right patellar: 2+  Left patellar: 2+          Assessment/Plan   Diagnoses and all orders for this visit:    1. Intractable migraine without aura and without status migrainosus (Primary)          Discussion/Summary   Delphnie Das comes to clinic today following a recent hospitalization for numbness and confusion. I suspect her symptoms were likely migrainous in nature. This was discussed. I offered to obtain an EEG, which was reasonably deferred for now. Otherwise, her workup has been complete and appropriate. After discussing potential treatment options, it was elected to continue on her current medications unchanged. She will continue follow up with [facility] and will follow up in our clinic on an as needed basis.   Total time of visit today: 40 minutes. As part of this visit I reviewed prior lab results, reviewed radiology results, reviewed radiology images and reviewed records from prior hospitalizations which is incorporated in the HPI. I also discussed diagnosis, prognosis, diagnostic testing, evaluation, current status, treatment options and management as discussed above.           Marcie Cruz PA-C

## 2021-04-19 ENCOUNTER — TELEPHONE (OUTPATIENT)
Dept: NEUROLOGY | Facility: CLINIC | Age: 61
End: 2021-04-19

## 2021-04-19 NOTE — TELEPHONE ENCOUNTER
It is possible. I would recommend continued follow up at [facility], [place]. Thanks

## 2021-04-19 NOTE — TELEPHONE ENCOUNTER
Provider: ANJU MCDOWELL   Caller: MARIAN   Relationship to Patient: PT   Phone Number: [phone]  Reason for Call: PT IS QUESTIONING IF SHE HAS HEMIPLEGIC MIGRAINES? PT WOULD LIKE TO KNOW WHERE SHE GOES FROM HERE.  PT STATES THAT SHE USUALLY HAS HEADACHES ON THE LEFT SIDE AND HER LEG FEELS LIKE IT'S ASLEEP QUITE A BIT. PLEASE REVIEW AND ADVISE.   When was the patient last seen:4/15/2021

## 2021-08-27 NOTE — TELEPHONE ENCOUNTER
Rx Refill Note  Requested Prescriptions     Pending Prescriptions Disp Refills   • dexlansoprazole (Dexilant) 60 MG capsule [Pharmacy Med Name: DEXILANT DR 60 MG CAPSULE 60 Capsule] 30 capsule 1     Sig: TAKE ONE CAPSULE BY MOUTH EVERY DAY.PATIENT NEEDS A APPOINTMENT PRIOR TO NEXT REFILL.      Last office visit with prescribing clinician: 9/24/2020      Next office visit with prescribing clinician: Visit date not found            Donvoan Quigley MA  08/27/21, 15:42 EDT

## 2021-08-30 RX ORDER — DEXLANSOPRAZOLE 60 MG/1
CAPSULE, DELAYED RELEASE ORAL
Qty: 30 CAPSULE | Refills: 1 | Status: SHIPPED | OUTPATIENT
Start: 2021-08-30 | End: 2021-11-02

## 2021-09-09 RX ORDER — SODIUM, POTASSIUM,MAG SULFATES 17.5-3.13G
2 SOLUTION, RECONSTITUTED, ORAL ORAL TAKE AS DIRECTED
Qty: 354 ML | Refills: 0 | Status: SHIPPED | OUTPATIENT
Start: 2021-09-09 | End: 2022-04-27 | Stop reason: SDUPTHER

## 2021-11-02 RX ORDER — DEXLANSOPRAZOLE 60 MG/1
CAPSULE, DELAYED RELEASE ORAL
Qty: 90 CAPSULE | Refills: 0 | Status: SHIPPED | OUTPATIENT
Start: 2021-11-02

## 2022-04-27 RX ORDER — SODIUM, POTASSIUM,MAG SULFATES 17.5-3.13G
SOLUTION, RECONSTITUTED, ORAL ORAL
Qty: 354 ML | Refills: 0 | Status: SHIPPED | OUTPATIENT
Start: 2022-04-27

## 2022-12-15 ENCOUNTER — OUTSIDE FACILITY SERVICE (OUTPATIENT)
Dept: GASTROENTEROLOGY | Facility: CLINIC | Age: 62
End: 2022-12-15

## 2022-12-15 PROCEDURE — 45385 COLONOSCOPY W/LESION REMOVAL: CPT | Performed by: INTERNAL MEDICINE

## 2022-12-15 PROCEDURE — 43235 EGD DIAGNOSTIC BRUSH WASH: CPT | Performed by: INTERNAL MEDICINE

## 2024-07-29 RX ORDER — DEXLANSOPRAZOLE 60 MG/1
CAPSULE, DELAYED RELEASE ORAL
Qty: 90 CAPSULE | Refills: 0 | Status: SHIPPED | OUTPATIENT
Start: 2024-07-29

## 2024-07-29 NOTE — TELEPHONE ENCOUNTER
Patient is needing a refill on Dexilant.  Will fill for 90 days but she will need a follow up appointment for additional refills.

## 2024-07-30 NOTE — TELEPHONE ENCOUNTER
MD reviewed results and dc instructions with pt, pt verbalized understanding. AVS printed and given. Refer to MD notes for pt assessment.    Prior authorization for Dexlansoprazole submitted through cover [employer].  Approved #[id number]   Effective- 7/30/24-7/30/25

## 2025-02-24 ENCOUNTER — OFFICE VISIT (OUTPATIENT)
Dept: GASTROENTEROLOGY | Facility: CLINIC | Age: 65
End: 2025-02-24
Payer: MEDICARE

## 2025-02-24 VITALS — HEIGHT: 70 IN | BODY MASS INDEX: 21.76 KG/M2 | WEIGHT: 152 LBS

## 2025-02-24 DIAGNOSIS — K21.9 GASTROESOPHAGEAL REFLUX DISEASE, UNSPECIFIED WHETHER ESOPHAGITIS PRESENT: Primary | ICD-10-CM

## 2025-02-24 DIAGNOSIS — R07.9 CHEST PAIN, UNSPECIFIED TYPE: ICD-10-CM

## 2025-02-24 DIAGNOSIS — D12.3 ADENOMATOUS POLYP OF TRANSVERSE COLON: ICD-10-CM

## 2025-02-24 DIAGNOSIS — Z80.0 FAMILY HISTORY OF COLON CANCER: ICD-10-CM

## 2025-02-24 PROCEDURE — 99214 OFFICE O/P EST MOD 30 MIN: CPT | Performed by: INTERNAL MEDICINE

## 2025-02-24 PROCEDURE — 1159F MED LIST DOCD IN RCRD: CPT | Performed by: INTERNAL MEDICINE

## 2025-02-24 PROCEDURE — 1160F RVW MEDS BY RX/DR IN RCRD: CPT | Performed by: INTERNAL MEDICINE

## 2025-02-24 RX ORDER — ATOGEPANT 60 MG/1
1 TABLET ORAL DAILY
COMMUNITY
Start: 2025-01-31

## 2025-02-24 NOTE — LETTER
February 24, 2025     Timur Virgen MD  [address] [facility] [address]    Patient: Deplhine Das   YOB: 1960   Date of Visit: 2/24/2025     Dear Timur Virgen MD:       Thank you for referring Delphine Das to me for evaluation. Below are the relevant portions of my assessment and plan of care.    If you have questions, please do not hesitate to call me. I look forward to following Delphine along with you.         Sincerely,        Clinton Bernard MD        CC: No Recipients    Clinton Bernard MD  02/24/25 1700  Sign when Signing Visit  PCP:  Timur Virgen MD     No referring provider defined for this encounter.    Chief Complaint   Patient presents with   • Follow-up     Follow up GERD        HPI   The patient is a 64-year-old known to me.  She is now having refractory reflux.  She has some burning chest pain.  She did better on the Dexilant but is on Nexium now.  She takes 4 to 6/day which would be the equivalent of at least Nexium 40 mg twice a day.  She does not complain of dysphagia at the moment.  Her last upper endoscopy was 12/15/2022.  She has medium sized hiatal hernia at that time and some inflammation in the stomach.  Her last colonoscopy was 12/15/2022.  She had adenomatous polyps removed from the transverse colon and rectum.  A 5-year follow-up was suggested.  She does have a strong family history with sisters with polyps and a sister with stage IV colon cancer.    No Known Allergies       Current Outpatient Medications:   •  Qulipta 60 MG tablet, Take 1 tablet by mouth Daily., Disp: , Rfl:   •  dexlansoprazole (Dexilant) 60 MG capsule, TAKE ONE CAPSULE BY MOUTH EVERY DAY (NEED APPOINTMENT PRIOR TO NEXT REFILL), Disp: 90 capsule, Rfl: 0  •  DULOXETINE HCL PO, Take 90 mg by mouth Daily., Disp: , Rfl:   •  fluticasone (FLONASE) 50 MCG/ACT nasal spray, into the nostril(s) as directed by provider As Needed., Disp: , Rfl:   •  levETIRAcetam (KEPPRA) 500 MG tablet, Take  500 mg by mouth 2 (Two) Times a Day., Disp: , Rfl:   •  pravastatin (PRAVACHOL) 40 MG tablet, Take 40 mg by mouth every night at bedtime., Disp: , Rfl: 11  •  promethazine (PHENERGAN) 25 MG tablet, TAKE ONE TABLET BY MOUTH THREE TIMES A DAY AS NEEDED FOR HEADACHE NAUSEA AND VOMITING, Disp: , Rfl: 0  •  Stiolto Respimat 2.5-2.5 MCG/ACT aerosol solution inhaler, 2 puffs As Needed., Disp: , Rfl:      Past Medical History:   Diagnosis Date   • Asthma    • Colon polyp    • GERD (gastroesophageal reflux disease)    • Hiatal hernia    • Hyperlipidemia    • Hypertension    • Migraine    • PFO (patent foramen ovale)        Past Surgical History:   Procedure Laterality Date   • CARDIAC CATHETERIZATION N/A 1/25/2021    Procedure: LEFT HEART CATH;  Surgeon: Adelso Burnett IV, MD;  Location:  [facility];  Service: Cardiovascular;  Laterality: N/A;   • CARDIAC CATHETERIZATION N/A 1/25/2021    Procedure: Coronary angiography;  Surgeon: Adelso Burnett IV, MD;  Location:  [facility];  Service: Cardiovascular;  Laterality: N/A;   • COLONOSCOPY     • GASTRIC PH MONITORING 24HR N/A 9/1/2020    Procedure: GASTRIC PH MONITORING 24HR;  Surgeon: Cliff Sheffield MD;  Location:  [facility];  Service: General;  Laterality: N/A;  Probe successfully placed 25 at left nare   • UPPER GASTROINTESTINAL ENDOSCOPY          Social History     Socioeconomic History   • Marital status:    Tobacco Use   • Smoking status: Never   • Smokeless tobacco: Never   Vaping Use   • Vaping status: Never Used   Substance and Sexual Activity   • Alcohol use: Yes     Comment: 3-4 drinks a week   • Drug use: No   • Sexual activity: Defer        Family History   Problem Relation Age of Onset   • Colon polyps Mother    • Colon polyps Sister    • Colon cancer Brother    • No Known Problems Brother    • No Known Problems Brother    • Stroke Sister    • No Known Problems Sister    • No Known Problems  Sister    • No Known Problems Sister    • No Known Problems Sister         Review of Systems     There were no vitals filed for this visit.     Physical Exam  Constitutional:       General: She is not in acute distress.     Appearance: Normal appearance. She is not ill-appearing.   Neurological:      Mental Status: She is alert.          Diagnoses and all orders for this visit:    1. Gastroesophageal reflux disease, unspecified whether esophagitis present (Primary): She now has refractory reflux despite taking 4-6 Nexium per day.  I gave her samples of Voquenza 20 mg.  Hopefully this will control her symptoms to a better degree.  Organ to recommend an upper endoscopy due to her acute worsening.  She does have some chest pains as a result as well.    2. Chest pain, unspecified type    3. Family history of colon cancer    4. Adenomatous polyp of transverse colon she is not yet due for reevaluation of the colon.  A 5-year follow-up was suggested at her last colonoscopy.         Clinton Bernard MD

## 2025-02-24 NOTE — PROGRESS NOTES
PCP:  Timur Virgen MD     No referring provider defined for this encounter.    Chief Complaint   Patient presents with    Follow-up     Follow up GERD        HPI   The patient is a 64-year-old known to me.  She is now having refractory reflux.  She has some burning chest pain.  She did better on the Dexilant but is on Nexium now.  She takes 4 to 6/day which would be the equivalent of at least Nexium 40 mg twice a day.  She does not complain of dysphagia at the moment.  Her last upper endoscopy was 12/15/2022.  She has medium sized hiatal hernia at that time and some inflammation in the stomach.  Her last colonoscopy was 12/15/2022.  She had adenomatous polyps removed from the transverse colon and rectum.  A 5-year follow-up was suggested.  She does have a strong family history with sisters with polyps and a sister with stage IV colon cancer.    No Known Allergies       Current Outpatient Medications:     Qulipta 60 MG tablet, Take 1 tablet by mouth Daily., Disp: , Rfl:     dexlansoprazole (Dexilant) 60 MG capsule, TAKE ONE CAPSULE BY MOUTH EVERY DAY (NEED APPOINTMENT PRIOR TO NEXT REFILL), Disp: 90 capsule, Rfl: 0    DULOXETINE HCL PO, Take 90 mg by mouth Daily., Disp: , Rfl:     fluticasone (FLONASE) 50 MCG/ACT nasal spray, into the nostril(s) as directed by provider As Needed., Disp: , Rfl:     levETIRAcetam (KEPPRA) 500 MG tablet, Take 500 mg by mouth 2 (Two) Times a Day., Disp: , Rfl:     pravastatin (PRAVACHOL) 40 MG tablet, Take 40 mg by mouth every night at bedtime., Disp: , Rfl: 11    promethazine (PHENERGAN) 25 MG tablet, TAKE ONE TABLET BY MOUTH THREE TIMES A DAY AS NEEDED FOR HEADACHE NAUSEA AND VOMITING, Disp: , Rfl: 0    Stiolto Respimat 2.5-2.5 MCG/ACT aerosol solution inhaler, 2 puffs As Needed., Disp: , Rfl:      Past Medical History:   Diagnosis Date    Asthma     Colon polyp     GERD (gastroesophageal reflux disease)     Hiatal hernia     Hyperlipidemia     Hypertension     Migraine     PFO  (patent foramen ovale)        Past Surgical History:   Procedure Laterality Date    CARDIAC CATHETERIZATION N/A 1/25/2021    Procedure: LEFT HEART CATH;  Surgeon: Adelso Burnett IV, MD;  Location:  [facility];  Service: Cardiovascular;  Laterality: N/A;    CARDIAC CATHETERIZATION N/A 1/25/2021    Procedure: Coronary angiography;  Surgeon: Adelso Burnett IV, MD;  Location:  [facility];  Service: Cardiovascular;  Laterality: N/A;    COLONOSCOPY      GASTRIC PH MONITORING 24HR N/A 9/1/2020    Procedure: GASTRIC PH MONITORING 24HR;  Surgeon: Cliff Sheffield MD;  Location:  [facility];  Service: General;  Laterality: N/A;  Probe successfully placed 25 at left nare    UPPER GASTROINTESTINAL ENDOSCOPY          Social History     Socioeconomic History    Marital status:    Tobacco Use    Smoking status: Never    Smokeless tobacco: Never   Vaping Use    Vaping status: Never Used   Substance and Sexual Activity    Alcohol use: Yes     Comment: 3-4 drinks a week    Drug use: No    Sexual activity: Defer        Family History   Problem Relation Age of Onset    Colon polyps Mother     Colon polyps Sister     Colon cancer Brother     No Known Problems Brother     No Known Problems Brother     Stroke Sister     No Known Problems Sister     No Known Problems Sister     No Known Problems Sister     No Known Problems Sister         Review of Systems     There were no vitals filed for this visit.     Physical Exam  Constitutional:       General: She is not in acute distress.     Appearance: Normal appearance. She is not ill-appearing.   Neurological:      Mental Status: She is alert.          Diagnoses and all orders for this visit:    1. Gastroesophageal reflux disease, unspecified whether esophagitis present (Primary): She now has refractory reflux despite taking 4-6 Nexium per day.  I gave her samples of Voquenza 20 mg.  Hopefully this will control her  symptoms to a better degree.  Organ to recommend an upper endoscopy due to her acute worsening.  She does have some chest pains as a result as well.    2. Chest pain, unspecified type    3. Family history of colon cancer    4. Adenomatous polyp of transverse colon she is not yet due for reevaluation of the colon.  A 5-year follow-up was suggested at her last colonoscopy.         Clinton Bernard MD

## 2025-03-06 ENCOUNTER — OUTSIDE FACILITY SERVICE (OUTPATIENT)
Dept: GASTROENTEROLOGY | Facility: CLINIC | Age: 65
End: 2025-03-06
Payer: COMMERCIAL

## 2025-03-06 DIAGNOSIS — R10.13 EPIGASTRIC PAIN: Primary | ICD-10-CM

## 2025-03-11 RX ORDER — VONOPRAZAN FUMARATE 26.72 MG/1
TABLET ORAL
Qty: 30 TABLET | Refills: 5 | Status: SHIPPED | OUTPATIENT
Start: 2025-03-11

## 2025-03-12 ENCOUNTER — TELEPHONE (OUTPATIENT)
Dept: GASTROENTEROLOGY | Facility: CLINIC | Age: 65
End: 2025-03-12
Payer: COMMERCIAL

## 2025-03-12 NOTE — TELEPHONE ENCOUNTER
Caller: Delphine Das    Relationship to patient: Self    Best call back number: [phone]     Patient is needing: PT IS NEEDING A PA FOR HER MEDICINE            Vonoprazan Fumarate (Voquezna) 20 MG tablet [[id number]] (Order [id number])          [facility] - [place], [place] - [address] [place] [address] [phone]  - [phone] [address] [address]  Phone: [phone]  Fax: [phone]

## 2025-04-04 ENCOUNTER — HOSPITAL ENCOUNTER (OUTPATIENT)
Dept: ULTRASOUND IMAGING | Facility: HOSPITAL | Age: 65
Discharge: HOME | End: 2025-04-04
Admitting: INTERNAL MEDICINE
Payer: COMMERCIAL

## 2025-04-04 DIAGNOSIS — R10.13 EPIGASTRIC PAIN: ICD-10-CM

## 2025-04-04 PROCEDURE — 76705 ECHO EXAM OF ABDOMEN: CPT

## 2025-07-21 ENCOUNTER — TELEPHONE (OUTPATIENT)
Dept: GASTROENTEROLOGY | Facility: CLINIC | Age: 65
End: 2025-07-21
Payer: COMMERCIAL

## 2025-07-21 NOTE — TELEPHONE ENCOUNTER
Caller: MARIAN MURRAY     Relationship to patient:   SELF    Best call back number: [phone]     Provider: DR VASHTI ELIZONDO    Medication PA needed: Vonoprazan Fumarate (Voquezna) 20 MG tablet     Reason for call/Prior Auth: INSURANCE ISNT WANTING TO COVER IT

## 2025-07-23 NOTE — TELEPHONE ENCOUNTER
Spoke with pharmacy and her insurance now needs a quantity limit authorization.  Submitted that and it is pending review. Patient notified that she can  samples while we are waiting for the determination from her insurance.

## 2025-08-05 ENCOUNTER — TELEPHONE (OUTPATIENT)
Dept: GASTROENTEROLOGY | Facility: CLINIC | Age: 65
End: 2025-08-05
Payer: COMMERCIAL

## 2025-08-06 RX ORDER — VONOPRAZAN FUMARATE 26.72 MG/1
TABLET ORAL
Qty: 90 TABLET | Refills: 3 | Status: SHIPPED | OUTPATIENT
Start: 2025-08-06

## (undated) DEVICE — SKIN PREP TRAY W/CHG: Brand: MEDLINE INDUSTRIES, INC.

## (undated) DEVICE — GLIDESHEATH BASIC HYDROPHILIC COATED INTRODUCER SHEATH: Brand: GLIDESHEATH

## (undated) DEVICE — MODEL BT2000 P/N 700287-012KIT CONTENTS: MANIFOLD WITH SALINE AND CONTRAST PORTS, SALINE TUBING WITH SPIKE AND HAND SYRINGE, TRANSDUCER: Brand: BT2000 AUTOMATED MANIFOLD KIT

## (undated) DEVICE — MODEL AT P65, P/N 701554-001KIT CONTENTS: HAND CONTROLLER, 3-WAY HIGH-PRESSURE STOPCOCK WITH ROTATING END AND PREMIUM HIGH-PRESSURE TUBING: Brand: ANGIOTOUCH® KIT

## (undated) DEVICE — HLDR TBG NG

## (undated) DEVICE — DEV COMP RAD PRELUDESYNC 24CM

## (undated) DEVICE — GLIDESHEATH SLENDER STAINLESS STEEL KIT: Brand: GLIDESHEATH SLENDER

## (undated) DEVICE — RADIFOCUS OPTITORQUE ANGIOGRAPHIC CATHETER: Brand: OPTITORQUE

## (undated) DEVICE — PROB ESOPH COMFORTEC IMPEDANCE NON/INF 6F 2.3 18CM

## (undated) DEVICE — Device

## (undated) DEVICE — PK CATH CARD 10